# Patient Record
Sex: FEMALE | Race: WHITE | Employment: FULL TIME | URBAN - METROPOLITAN AREA
[De-identification: names, ages, dates, MRNs, and addresses within clinical notes are randomized per-mention and may not be internally consistent; named-entity substitution may affect disease eponyms.]

---

## 2017-04-12 ENCOUNTER — OFFICE VISIT (OUTPATIENT)
Dept: OBSTETRICS AND GYNECOLOGY | Facility: CLINIC | Age: 62
End: 2017-04-12
Payer: COMMERCIAL

## 2017-04-12 VITALS
SYSTOLIC BLOOD PRESSURE: 144 MMHG | HEIGHT: 65 IN | HEART RATE: 80 BPM | DIASTOLIC BLOOD PRESSURE: 94 MMHG | BODY MASS INDEX: 38.6 KG/M2 | WEIGHT: 231.69 LBS

## 2017-04-12 DIAGNOSIS — Z12.31 SCREENING MAMMOGRAM, ENCOUNTER FOR: ICD-10-CM

## 2017-04-12 DIAGNOSIS — Z01.419 ENCOUNTER FOR WELL WOMAN EXAM WITH ROUTINE GYNECOLOGICAL EXAM: Primary | ICD-10-CM

## 2017-04-12 DIAGNOSIS — Z12.39 BREAST CANCER SCREENING: ICD-10-CM

## 2017-04-12 PROCEDURE — 99386 PREV VISIT NEW AGE 40-64: CPT | Mod: S$GLB,,, | Performed by: NURSE PRACTITIONER

## 2017-04-12 PROCEDURE — 3080F DIAST BP >= 90 MM HG: CPT | Mod: S$GLB,,, | Performed by: NURSE PRACTITIONER

## 2017-04-12 PROCEDURE — 99999 PR PBB SHADOW E&M-EST. PATIENT-LVL III: CPT | Mod: PBBFAC,,, | Performed by: NURSE PRACTITIONER

## 2017-04-12 PROCEDURE — 3077F SYST BP >= 140 MM HG: CPT | Mod: S$GLB,,, | Performed by: NURSE PRACTITIONER

## 2017-04-12 PROCEDURE — 88175 CYTOPATH C/V AUTO FLUID REDO: CPT

## 2017-04-12 RX ORDER — METFORMIN HYDROCHLORIDE 500 MG/1
TABLET ORAL
Refills: 0 | COMMUNITY
Start: 2017-04-04 | End: 2020-03-11

## 2017-04-12 RX ORDER — VALSARTAN 80 MG/1
80 TABLET ORAL DAILY
Refills: 0 | COMMUNITY
Start: 2017-01-30 | End: 2020-03-11 | Stop reason: ALTCHOICE

## 2017-04-18 NOTE — PROGRESS NOTES
Chief Complaint   Patient presents with    Well Woman       History of Present Illness: Della Juarez is a 61 y.o. female that presents today 18 for well gyn visit.  Pt here for annual exam. Pt reports that it has been about 10 years since she has had a well woman exam. She denies ever having any abnormal pap smears in the past. She stopped having cycles at age 49 and has never been on HRT. She does report that has been having hot flashes and night sweats for several years and would like to discuss getting on hormones. Pt is also due for a mammogram and states she has been in over 3 years because the last one she had hurt really bad. Pt denies any other complaints or concerns at this time.         Past Medical History:   Diagnosis Date    Diabetes mellitus     Hypertension     Mental disorder     Anxiety       Past Surgical History:   Procedure Laterality Date     SECTION      x3    COSMETIC SURGERY      Scar on right side of face       Family History   Problem Relation Age of Onset    Diabetes Maternal Grandmother     Hypertension Maternal Grandmother     Stroke Maternal Grandmother     Diabetes Father     Hypertension Father     Hemochromatosis Mother     Hypertension Mother     Stroke Mother     Breast cancer Neg Hx     Cancer Neg Hx     Colon cancer Neg Hx     Ovarian cancer Neg Hx     Eclampsia Neg Hx     Miscarriages / Stillbirths Neg Hx      labor Neg Hx        Social History     Social History    Marital status:      Spouse name: N/A    Number of children: N/A    Years of education: N/A     Social History Main Topics    Smoking status: Former Smoker     Packs/day: 0.50     Types: Cigarettes     Start date:      Quit date:     Smokeless tobacco: Never Used    Alcohol use No    Drug use: No    Sexual activity: Not Currently     Other Topics Concern    None     Social History Narrative       OB History    Para Term  AB SAB TAB Ectopic  "Multiple Living   3 0 0 0 0 0 0 0 0 3      # Outcome Date GA Lbr Wilfrid/2nd Weight Sex Delivery Anes PTL Lv   3       CS-Unspec   Y   2       CS-Unspec   Y   1       CS-Unspec   Y          Current Outpatient Prescriptions   Medication Sig Dispense Refill    alprazolam (XANAX) 0.25 MG tablet Take 1 tablet (0.25 mg total) by mouth 3 (three) times daily as needed for Insomnia or Anxiety. 60 tablet 2    aspirin (ECOTRIN) 81 MG EC tablet Take 1 tablet (81 mg total) by mouth once daily.  0    estrogen, conjugated,-medroxyprogesterone 0.625-2.5mg (PREMPRO) 0.625-2.5 mg per tablet Take 1 tablet by mouth once daily. 30 tablet 11    metformin (GLUCOPHAGE) 500 MG tablet take 1 tablet by mouth once daily with meals  0    valsartan (DIOVAN) 80 MG tablet Take 80 mg by mouth once daily.  0     No current facility-administered medications for this visit.        Review of patient's allergies indicates:   Allergen Reactions    Penicillins      Other reaction(s): Vomiting  Other reaction(s): Swelling  Other reaction(s): Nausea  Other reaction(s): Hives       Review of Symptoms:  GENERAL: Denies weight gain or weight loss. Feeling well overall.   SKIN: Denies rash or lesions.   ABDOMEN: No abdominal pain, constipation, diarrhea, nausea, vomiting or rectal bleeding.   URINARY: No frequency, dysuria, hematuria, or burning on urination.    BP (!) 144/94  Pulse 80  Ht 5' 4.5" (1.638 m)  Wt 105.1 kg (231 lb 11.3 oz)  BMI 39.16 kg/m2    Physical Exam:  APPEARANCE: Well nourished, well developed, in no acute distress.  SKIN: Normal skin turgor, no lesions.  ABDOMEN: Soft. No tenderness or masses. No hepatosplenomegaly. No hernias.  BREASTS: Symmetrical, no skin changes or visible lesions. No palpable masses, nipple discharge or adenopathy bilaterally.  PELVIC: Normal external female genitalia without lesions. Normal hair distribution. Adequate perineal body, normal urethral meatus. Urethra with no masses.  Bladder " nontender. Pt did not tolerate speculum exam very well. Vagina dry with minimal rugae; without lesions or discharge. Cervix pink and without lesions. No significant cystocele or rectocele. Bimanual exam showed uterus normal size, shape, position, mobile and nontender. Adnexa without masses or tenderness. Urethra and bladder normal. PAP DONE    ASSESSMENT/PLAN:  Encounter for well woman exam with routine gynecological exam  -     Liquid-based pap smear, screening    Breast cancer screening  -     Mammo Digital Screening Bilat with CAD; Future; Expected date: 4/12/17    Screening mammogram, encounter for    Other orders  -     estrogen, conjugated,-medroxyprogesterone 0.625-2.5mg (PREMPRO) 0.625-2.5 mg per tablet; Take 1 tablet by mouth once daily.  Dispense: 30 tablet; Refill: 11      A full discussion of the benefit-risk ratio of hormonal replacement therapy was carried out. Improvement in vasomotor and other climacteric symptoms is discussed, including possible improvements in sleep and mood. Reduction of risk for osteoporosis was explained. We discussed the study data showing increased risk of thrombo-embolic events such as myocardial infarction, stroke and also possibly breast cancer with estrogen replacement, and how this might affect her. The range of side effects such as breast tenderness, weight gain and including possible increases in lifetime risk of breast cancer and possible thrombotic complications was discussed. We also discussed ACOG's recommendation to use hormone replacement therapy for the shortest amount of time and the lowest dose possible. Alternative such as herbal and soy-based products were reviewed. All of her questions about this therapy were answered. Pt prescribed prempro. However after going over the risks, she is unsure if she will take it. Gave her info on black cohosh and she states that she will probably try this first and may not even take the prempro.         Patient was counseled  today on Pap guidelines, recommendation for pelvic exams, mammograms starting annually at age 40, Colonoscopy after the age of 50, Dexa Bone Scan and calcium and vitamin D supplementation in menopause and to see her PCP for other health maintenance.

## 2020-02-17 DIAGNOSIS — E11.9 TYPE 2 DIABETES MELLITUS WITHOUT COMPLICATION, WITHOUT LONG-TERM CURRENT USE OF INSULIN: Primary | ICD-10-CM

## 2020-02-17 RX ORDER — METFORMIN HYDROCHLORIDE 500 MG/1
500 TABLET, FILM COATED, EXTENDED RELEASE ORAL 2 TIMES DAILY WITH MEALS
Qty: 180 TABLET | Refills: 0 | Status: SHIPPED | OUTPATIENT
Start: 2020-02-17 | End: 2020-03-11

## 2020-02-17 NOTE — TELEPHONE ENCOUNTER
----- Message from Kyra Gerber sent at 2/17/2020  2:19 PM CST -----  Refill on Metformin 500 mg ER to [facility] on [address].      # [phone]

## 2020-03-11 ENCOUNTER — TELEPHONE (OUTPATIENT)
Dept: FAMILY MEDICINE | Facility: CLINIC | Age: 65
End: 2020-03-11

## 2020-03-11 ENCOUNTER — OFFICE VISIT (OUTPATIENT)
Dept: FAMILY MEDICINE | Facility: CLINIC | Age: 65
End: 2020-03-11
Payer: COMMERCIAL

## 2020-03-11 VITALS
HEART RATE: 88 BPM | WEIGHT: 222.19 LBS | SYSTOLIC BLOOD PRESSURE: 135 MMHG | HEIGHT: 65 IN | DIASTOLIC BLOOD PRESSURE: 88 MMHG | BODY MASS INDEX: 37.02 KG/M2

## 2020-03-11 DIAGNOSIS — E11.9 TYPE 2 DIABETES MELLITUS WITHOUT COMPLICATION, WITHOUT LONG-TERM CURRENT USE OF INSULIN: ICD-10-CM

## 2020-03-11 DIAGNOSIS — Z20.1 EXPOSURE TO TB: Primary | ICD-10-CM

## 2020-03-11 DIAGNOSIS — I10 ESSENTIAL HYPERTENSION: ICD-10-CM

## 2020-03-11 DIAGNOSIS — F41.1 GAD (GENERALIZED ANXIETY DISORDER): ICD-10-CM

## 2020-03-11 LAB — HBA1C MFR BLD: 6.1 %

## 2020-03-11 PROCEDURE — 3008F BODY MASS INDEX DOCD: CPT | Mod: S$GLB,,, | Performed by: FAMILY MEDICINE

## 2020-03-11 PROCEDURE — 3079F PR MOST RECENT DIASTOLIC BLOOD PRESSURE 80-89 MM HG: ICD-10-PCS | Mod: S$GLB,,, | Performed by: FAMILY MEDICINE

## 2020-03-11 PROCEDURE — 3044F PR MOST RECENT HEMOGLOBIN A1C LEVEL <7.0%: ICD-10-PCS | Mod: S$GLB,,, | Performed by: FAMILY MEDICINE

## 2020-03-11 PROCEDURE — 99213 OFFICE O/P EST LOW 20 MIN: CPT | Mod: S$GLB,,, | Performed by: FAMILY MEDICINE

## 2020-03-11 PROCEDURE — 3079F DIAST BP 80-89 MM HG: CPT | Mod: S$GLB,,, | Performed by: FAMILY MEDICINE

## 2020-03-11 PROCEDURE — 3075F SYST BP GE 130 - 139MM HG: CPT | Mod: S$GLB,,, | Performed by: FAMILY MEDICINE

## 2020-03-11 PROCEDURE — 83036 POCT HEMOGLOBIN A1C: ICD-10-PCS | Mod: QW,,, | Performed by: FAMILY MEDICINE

## 2020-03-11 PROCEDURE — 3044F HG A1C LEVEL LT 7.0%: CPT | Mod: S$GLB,,, | Performed by: FAMILY MEDICINE

## 2020-03-11 PROCEDURE — 3075F PR MOST RECENT SYSTOLIC BLOOD PRESS GE 130-139MM HG: ICD-10-PCS | Mod: S$GLB,,, | Performed by: FAMILY MEDICINE

## 2020-03-11 PROCEDURE — 99213 PR OFFICE/OUTPT VISIT, EST, LEVL III, 20-29 MIN: ICD-10-PCS | Mod: S$GLB,,, | Performed by: FAMILY MEDICINE

## 2020-03-11 PROCEDURE — 83036 HEMOGLOBIN GLYCOSYLATED A1C: CPT | Mod: QW,,, | Performed by: FAMILY MEDICINE

## 2020-03-11 PROCEDURE — 3008F PR BODY MASS INDEX (BMI) DOCUMENTED: ICD-10-PCS | Mod: S$GLB,,, | Performed by: FAMILY MEDICINE

## 2020-03-11 RX ORDER — GLIMEPIRIDE 2 MG/1
1 TABLET ORAL 2 TIMES DAILY
COMMUNITY
Start: 2020-02-10 | End: 2020-03-11 | Stop reason: SDUPTHER

## 2020-03-11 RX ORDER — TELMISARTAN 80 MG/1
TABLET ORAL
COMMUNITY
Start: 2020-02-26 | End: 2020-03-11 | Stop reason: SDUPTHER

## 2020-03-11 NOTE — TELEPHONE ENCOUNTER
----- Message from Kyra Gerber sent at 3/11/2020  9:19 AM CDT -----  Pt states she may have bene exposed to TB and is requesting to be tested. She has been added to todays schedule and white dotted.  [phone]

## 2020-03-11 NOTE — PROGRESS NOTES
SUBJECTIVE:    Patient ID: Della Juarez is a 64 y.o. female.    Chief Complaint: No chief complaint on file.    Pt was visiting her brother, who was born with cleft lip and pallet) from [place], that arrived last week.  He was brought to the hospital on Friday.  He was sick with severe ear and throat pain.  Told he may has a URI, one of which may be TB.  She was told that due to her medical problems she needs to be checked for TB.  He has some kind of fungus.     Pt has also been keeping a log of his blood sugars. A1c is 6.1.  Has been working on diet and has noticed a drop in blood sugars.  Has also noticed that sometimes his blood sugars go a little low depending on stress.      Office Visit on 2020   Component Date Value Ref Range Status    Hemoglobin A1C 2020 6.1  % Final       Past Medical History:   Diagnosis Date    Diabetes mellitus     Hypertension     Mental disorder     Anxiety     Past Surgical History:   Procedure Laterality Date     SECTION      x3    COSMETIC SURGERY      Scar on right side of face     Family History   Problem Relation Age of Onset    Diabetes Maternal Grandmother     Hypertension Maternal Grandmother     Stroke Maternal Grandmother     Diabetes Father     Hypertension Father     Hemochromatosis Mother     Hypertension Mother     Stroke Mother     Breast cancer Neg Hx     Cancer Neg Hx     Colon cancer Neg Hx     Ovarian cancer Neg Hx     Eclampsia Neg Hx     Miscarriages / Stillbirths Neg Hx      labor Neg Hx        Marital Status:   Alcohol History:  reports that she does not drink alcohol.  Tobacco History:  reports that she quit smoking about 4 years ago. Her smoking use included cigarettes. She started smoking about 15 years ago. She smoked 0.50 packs per day. She has never used smokeless tobacco.  Drug History:  reports that she does not use drugs.    Review of patient's allergies indicates:   Allergen Reactions     Penicillins      Other reaction(s): Vomiting  Other reaction(s): Swelling  Other reaction(s): Nausea  Other reaction(s): Hives       Current Outpatient Medications:     GLIMEPIRIDE ORAL, Take 2 mg by mouth 2 (two) times daily., Disp: , Rfl:     ALPRAZolam (XANAX) 0.25 MG tablet, Take 1 tablet (0.25 mg total) by mouth 3 (three) times daily as needed for Insomnia or Anxiety., Disp: 60 tablet, Rfl: 2    aspirin (ECOTRIN) 81 MG EC tablet, Take 1 tablet (81 mg total) by mouth once daily., Disp: , Rfl: 0    estrogen, conjugated,-medroxyprogesterone 0.625-2.5mg (PREMPRO) 0.625-2.5 mg per tablet, Take 1 tablet by mouth once daily., Disp: 30 tablet, Rfl: 11    glimepiride (AMARYL) 2 MG tablet, Take 1 tablet (2 mg total) by mouth 2 (two) times daily., Disp: 60 tablet, Rfl: 2    metFORMIN (GLUCOPHAGE-XR) 500 MG XR 24hr tablet, Take 1 tablet (500 mg total) by mouth 2 (two) times daily with meals., Disp: 180 tablet, Rfl: 3    telmisartan (MICARDIS) 80 MG Tab, Take 1 tablet (80 mg total) by mouth once daily., Disp: 30 tablet, Rfl: 2    Review of Systems   Constitutional: Negative for appetite change, fatigue, fever and unexpected weight change.   Respiratory: Negative for cough, chest tightness, shortness of breath and wheezing.    Cardiovascular: Negative for chest pain and leg swelling.   Gastrointestinal: Negative for abdominal pain, constipation, nausea and vomiting.        -heartburn   Genitourinary: Negative for difficulty urinating, dysuria, frequency and urgency.   Musculoskeletal: Negative for arthralgias, back pain, myalgias and neck pain.   Skin: Negative for rash.   Neurological: Negative for dizziness, weakness, numbness and headaches.   Hematological: Does not bruise/bleed easily.   Psychiatric/Behavioral: Negative for dysphoric mood, sleep disturbance and suicidal ideas. The patient is nervous/anxious.    All other systems reviewed and are negative.         Objective:      Vitals:    03/11/20 1441 03/11/20  "1525   BP: (!) 164/88 135/88   Pulse: 88    Weight: 100.8 kg (222 lb 3.2 oz)    Height: 5' 4.5" (1.638 m)      Body mass index is 37.55 kg/m².     Physical Exam   Constitutional: She is oriented to person, place, and time. She appears well-developed and well-nourished. No distress.   Obese     HENT:   Head: Normocephalic and atraumatic.   Neck: Neck supple. No thyromegaly present.   Cardiovascular: Normal rate, regular rhythm and normal heart sounds. Exam reveals no friction rub.   No murmur heard.  Pulmonary/Chest: Effort normal and breath sounds normal. She has no wheezes. She has no rales.   Abdominal: Soft. Bowel sounds are normal. She exhibits no distension. There is no tenderness.   Musculoskeletal: She exhibits no edema.   Lymphadenopathy:     She has no cervical adenopathy.   Neurological: She is alert and oriented to person, place, and time.   Skin: Skin is warm and dry. No rash noted.   Psychiatric: She has a normal mood and affect. Her speech is normal and behavior is normal. Judgment and thought content normal. She is attentive.   Vitals reviewed.        Assessment:       1. Exposure to TB    2. Type 2 diabetes mellitus without complication, without long-term current use of insulin    3. MELODY (generalized anxiety disorder)    4. Essential hypertension         Plan:       Exposure to TB  Comments:  Pt advised to get TB test on Friday and then in 8-10 wks from that day    Type 2 diabetes mellitus without complication, without long-term current use of insulin  Comments:  Antihyperglycemic meds filled.  To monitor Blood sugar and hold glimiperide for low blood sugars or when skipping meals to prevent hypoglycemia  Orders:  -     Hemoglobin A1C, POCT  -     metFORMIN (GLUCOPHAGE-XR) 500 MG XR 24hr tablet; Take 1 tablet (500 mg total) by mouth 2 (two) times daily with meals.  Dispense: 180 tablet; Refill: 3  -     glimepiride (AMARYL) 2 MG tablet; Take 1 tablet (2 mg total) by mouth 2 (two) times daily.  " Dispense: 60 tablet; Refill: 2    MELODY (generalized anxiety disorder)  Comments:  Anxiolytic refilled.  Orders:  -     ALPRAZolam (XANAX) 0.25 MG tablet; Take 1 tablet (0.25 mg total) by mouth 3 (three) times daily as needed for Insomnia or Anxiety.  Dispense: 60 tablet; Refill: 2    Essential hypertension  Comments:  Blood pressure med refilled.  Orders:  -     telmisartan (MICARDIS) 80 MG Tab; Take 1 tablet (80 mg total) by mouth once daily.  Dispense: 30 tablet; Refill: 2    Other  Lab results discussed and reviewed with patient.    Follow up in about 2 days (around 3/13/2020), or if symptoms worsen or fail to improve, for for TB test, As scheduled.

## 2020-03-12 RX ORDER — GLIMEPIRIDE 2 MG/1
2 TABLET ORAL 2 TIMES DAILY
Qty: 60 TABLET | Refills: 2 | Status: SHIPPED | OUTPATIENT
Start: 2020-03-12 | End: 2020-04-11

## 2020-03-12 RX ORDER — METFORMIN HYDROCHLORIDE 500 MG/1
500 TABLET, EXTENDED RELEASE ORAL 2 TIMES DAILY WITH MEALS
Qty: 180 TABLET | Refills: 3 | Status: SHIPPED | OUTPATIENT
Start: 2020-03-12 | End: 2020-08-19 | Stop reason: SDUPTHER

## 2020-03-12 RX ORDER — TELMISARTAN 80 MG/1
80 TABLET ORAL DAILY
Qty: 30 TABLET | Refills: 2 | Status: SHIPPED | OUTPATIENT
Start: 2020-03-12 | End: 2020-06-18 | Stop reason: SDUPTHER

## 2020-03-12 RX ORDER — ALPRAZOLAM 0.25 MG/1
0.25 TABLET ORAL 3 TIMES DAILY PRN
Qty: 60 TABLET | Refills: 2 | Status: SHIPPED | OUTPATIENT
Start: 2020-03-12 | End: 2020-04-21 | Stop reason: DRUGHIGH

## 2020-03-13 ENCOUNTER — CLINICAL SUPPORT (OUTPATIENT)
Dept: FAMILY MEDICINE | Facility: CLINIC | Age: 65
End: 2020-03-13
Payer: COMMERCIAL

## 2020-03-13 DIAGNOSIS — Z11.1 SCREENING FOR TUBERCULOSIS: Primary | ICD-10-CM

## 2020-03-13 PROCEDURE — 86580 PR  TB INTRADERMAL TEST: ICD-10-PCS | Mod: S$GLB,,, | Performed by: FAMILY MEDICINE

## 2020-03-13 PROCEDURE — 86580 TB INTRADERMAL TEST: CPT | Mod: S$GLB,,, | Performed by: FAMILY MEDICINE

## 2020-03-16 ENCOUNTER — CLINICAL SUPPORT (OUTPATIENT)
Dept: FAMILY MEDICINE | Facility: CLINIC | Age: 65
End: 2020-03-16
Payer: COMMERCIAL

## 2020-03-16 DIAGNOSIS — Z11.1 SCREENING FOR TUBERCULOSIS: Primary | ICD-10-CM

## 2020-03-16 LAB
TB INDURATION - 48 HR READ: 0 MM
TB INDURATION - 72 HR READ: 0 MM
TB SKIN TEST - 48 HR READ: NEGATIVE
TB SKIN TEST - 72 HR READ: NEGATIVE

## 2020-03-16 PROCEDURE — 86580 TB INTRADERMAL TEST: CPT | Mod: S$GLB,,, | Performed by: FAMILY MEDICINE

## 2020-03-16 PROCEDURE — 86580 POCT TB SKIN TEST: ICD-10-PCS | Mod: S$GLB,,, | Performed by: FAMILY MEDICINE

## 2020-04-21 DIAGNOSIS — F41.1 GAD (GENERALIZED ANXIETY DISORDER): Primary | ICD-10-CM

## 2020-04-21 RX ORDER — ALPRAZOLAM 0.5 MG/1
1 TABLET ORAL 2 TIMES DAILY
COMMUNITY
End: 2020-04-21 | Stop reason: SDUPTHER

## 2020-04-21 RX ORDER — ALPRAZOLAM 0.5 MG/1
0.5 TABLET ORAL 2 TIMES DAILY
Qty: 60 TABLET | Refills: 2 | Status: SHIPPED | OUTPATIENT
Start: 2020-04-21 | End: 2020-08-19 | Stop reason: SDUPTHER

## 2020-04-21 NOTE — TELEPHONE ENCOUNTER
Spoke with pt - Pt xanax0.25mg cancelled at pharmacy. Please resend to [facility] -[phone] ABILIO

## 2020-04-21 NOTE — TELEPHONE ENCOUNTER
----- Message from Lisandra Watts MA sent at 4/21/2020 10:37 AM CDT -----  Pt called in to advise that her Rx for Alprazolam that was sent into to [facility] was for 0.25 mg, however the pt reports that the dosage was suppose to be for 0.5 mg.  She states she just called pharmacy and so far they have not received the corrected dosage of 0.5 mg.    Pharmacy - [facility] @ [address]    Pt - [phone]

## 2020-05-15 ENCOUNTER — TELEPHONE (OUTPATIENT)
Dept: FAMILY MEDICINE | Facility: CLINIC | Age: 65
End: 2020-05-15

## 2020-05-15 NOTE — TELEPHONE ENCOUNTER
----- Message from Fabrice Borges sent at 5/15/2020 11:43 AM CDT -----  Contact: Della Juarez  Pt needs a letter written for her job in regards to her being high-risk due to her underlying health issues. She needs this letter Monday if possible.   Pt# [phone]

## 2020-05-18 ENCOUNTER — TELEPHONE (OUTPATIENT)
Dept: FAMILY MEDICINE | Facility: CLINIC | Age: 65
End: 2020-05-18

## 2020-05-18 NOTE — LETTER
[address]  [place] [address]  Phone: [phone]   [phone]                        MD Fatuma Strong MD Chequita Williams, MD Matthew Bassett, PA-C Allison Hoffritz, DANIS Puentes, DANIS Richardson, DANIS      Date: 05/18/2020        Patient: Della Juarez  YOB: 1955      To whom it may concern:    Please excuse Della Juarez from work for until further notice starting May 15, 2020, due to being in a high risk group for COVID-19 infection due to pre-existing medical conditions.    Sincerely,            Fatuma Hollins MD

## 2020-05-18 NOTE — TELEPHONE ENCOUNTER
----- Message from Edwige Prather sent at 5/18/2020  2:25 PM CDT -----  Pre message pt is calling back about needing a letter for her employer   [phone]

## 2020-05-18 NOTE — TELEPHONE ENCOUNTER
Spoke with pt - Pt informed that her letter was ready and available to  at the front office. ABILIO

## 2020-06-18 DIAGNOSIS — I10 ESSENTIAL HYPERTENSION: ICD-10-CM

## 2020-06-18 DIAGNOSIS — E11.9 TYPE 2 DIABETES MELLITUS WITHOUT COMPLICATION, WITHOUT LONG-TERM CURRENT USE OF INSULIN: Primary | ICD-10-CM

## 2020-06-18 RX ORDER — GLIMEPIRIDE 2 MG/1
2 TABLET ORAL 2 TIMES DAILY
Qty: 180 TABLET | Refills: 1 | Status: SHIPPED | OUTPATIENT
Start: 2020-06-18 | End: 2020-08-19 | Stop reason: SDUPTHER

## 2020-06-18 RX ORDER — TELMISARTAN 80 MG/1
80 TABLET ORAL DAILY
Qty: 30 TABLET | Refills: 2 | Status: SHIPPED | OUTPATIENT
Start: 2020-06-18 | End: 2020-08-19 | Stop reason: SDUPTHER

## 2020-06-18 NOTE — TELEPHONE ENCOUNTER
----- Message from Fabrice Borges sent at 6/18/2020  3:11 PM CDT -----  Regarding: Della Juarez  Contact: Della Juarez  Needs a refill on Glimiperide   Send to [facility] on [address]  Pt# [phone]

## 2020-06-18 NOTE — TELEPHONE ENCOUNTER
----- Message from Fabrice Borges sent at 6/18/2020  3:32 PM CDT -----  Regarding: Refill  Contact: Della Juarez  Pt called back she needs Telmisartan  Send to [facility]  Pt# [phone]

## 2020-06-19 NOTE — TELEPHONE ENCOUNTER
The patient's prescription has been approved and sent to   [id number] - [place], [place] - [address] [address] [facility]  [address]  [facility] [address]  Phone: [phone] Fax: [phone]    [id number] - [place] - [address]  [address] [facility] [address]  Phone: [phone] Fax: [phone]

## 2020-08-12 ENCOUNTER — TELEPHONE (OUTPATIENT)
Dept: FAMILY MEDICINE | Facility: CLINIC | Age: 65
End: 2020-08-12

## 2020-08-12 NOTE — TELEPHONE ENCOUNTER
----- Message from Kyra Gerber sent at 8/12/2020  3:49 PM CDT -----  Pt would like the nurse to call her back she is wanting to discuss her HA1C.  She also needs a letter by this Friday for work that says she is Diabetic, HTN and high risk.  Ask that the letter state she needs to be in lower populated areas and not on weekends or night shift alone.    # [phone]

## 2020-08-12 NOTE — LETTER
[address]  [place] [address]  Phone: [phone]   [phone]                        MD Fatuma Strong MD Chequita Williams, MD Matthew Bassett, PA-C Allison Hoffritz, DANIS Navarro NP      Date: 08/13/2020    Patient: Della Juarez  YOB: 1955      To whom it may concern:    The purpose of this letter is to verify that Ms. Juarez has underlying health conditions that would put her at high risk for adverse complications should she contract COVID-19 disease. She has notified me that while she is capable and willing to do her job, she may be at increased risk for exposure from live guest interaction. It is strongly recommended that every attempt be made to accommodate Ms. Juarez ability to work in an environment that generally decreases her potential live personal interactions.        Sincerely,           Fatuma Hollins MD

## 2020-08-12 NOTE — TELEPHONE ENCOUNTER
Spoke with pt - pt states that she is concerned about her a1c from march and pt informed that she will have another a1c done at her upcoming appt on 8/19.     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 Pt states that her employer is trying to get her to work the night shift and weekend shift where people run around like they are crazy and even though there are signs up that clearly states mask must be worn they don't and excuses like I have a medical condition are thrown around like nothing. Pt states that she needs a letter stating something to the effect that she is capable and willing to do her job but she is at an increased risk for covid because of her medical conditions because of the excessive guest interaction. Please advise. ABILIO

## 2020-08-13 NOTE — TELEPHONE ENCOUNTER
----- Message from Fabrice Borges sent at 8/13/2020 10:24 AM CDT -----  Regarding: Needs call back from nurse.  Contact: Della Juarez  Pt is calling in regards to her message from yesterday, she says that she needs her letter by tomorrow, she says she doesn't want to be a pest.  Please give her a call back # [phone]

## 2020-08-19 ENCOUNTER — OFFICE VISIT (OUTPATIENT)
Dept: FAMILY MEDICINE | Facility: CLINIC | Age: 65
End: 2020-08-19
Payer: COMMERCIAL

## 2020-08-19 VITALS
HEIGHT: 65 IN | SYSTOLIC BLOOD PRESSURE: 132 MMHG | TEMPERATURE: 99 F | DIASTOLIC BLOOD PRESSURE: 84 MMHG | OXYGEN SATURATION: 97 % | BODY MASS INDEX: 39.09 KG/M2 | HEART RATE: 100 BPM | WEIGHT: 234.63 LBS

## 2020-08-19 DIAGNOSIS — Z12.31 SCREENING MAMMOGRAM, ENCOUNTER FOR: ICD-10-CM

## 2020-08-19 DIAGNOSIS — E11.9 TYPE 2 DIABETES MELLITUS WITHOUT COMPLICATION, WITHOUT LONG-TERM CURRENT USE OF INSULIN: Primary | ICD-10-CM

## 2020-08-19 DIAGNOSIS — Z79.899 LONG-TERM USE OF HIGH-RISK MEDICATION: ICD-10-CM

## 2020-08-19 DIAGNOSIS — Z13.29 SCREENING FOR ENDOCRINE DISORDER: ICD-10-CM

## 2020-08-19 DIAGNOSIS — I10 ESSENTIAL HYPERTENSION: ICD-10-CM

## 2020-08-19 DIAGNOSIS — Z13.6 SCREENING FOR ISCHEMIC HEART DISEASE (IHD): ICD-10-CM

## 2020-08-19 DIAGNOSIS — F41.1 GAD (GENERALIZED ANXIETY DISORDER): ICD-10-CM

## 2020-08-19 LAB — HBA1C MFR BLD: 6.7 %

## 2020-08-19 PROCEDURE — 83036 POCT HEMOGLOBIN A1C: ICD-10-PCS | Mod: QW,,, | Performed by: FAMILY MEDICINE

## 2020-08-19 PROCEDURE — 3044F PR MOST RECENT HEMOGLOBIN A1C LEVEL <7.0%: ICD-10-PCS | Mod: S$GLB,,, | Performed by: FAMILY MEDICINE

## 2020-08-19 PROCEDURE — 3008F BODY MASS INDEX DOCD: CPT | Mod: S$GLB,,, | Performed by: FAMILY MEDICINE

## 2020-08-19 PROCEDURE — 3079F PR MOST RECENT DIASTOLIC BLOOD PRESSURE 80-89 MM HG: ICD-10-PCS | Mod: S$GLB,,, | Performed by: FAMILY MEDICINE

## 2020-08-19 PROCEDURE — 3008F PR BODY MASS INDEX (BMI) DOCUMENTED: ICD-10-PCS | Mod: S$GLB,,, | Performed by: FAMILY MEDICINE

## 2020-08-19 PROCEDURE — 99214 PR OFFICE/OUTPT VISIT, EST, LEVL IV, 30-39 MIN: ICD-10-PCS | Mod: S$GLB,,, | Performed by: FAMILY MEDICINE

## 2020-08-19 PROCEDURE — 3075F SYST BP GE 130 - 139MM HG: CPT | Mod: S$GLB,,, | Performed by: FAMILY MEDICINE

## 2020-08-19 PROCEDURE — 3075F PR MOST RECENT SYSTOLIC BLOOD PRESS GE 130-139MM HG: ICD-10-PCS | Mod: S$GLB,,, | Performed by: FAMILY MEDICINE

## 2020-08-19 PROCEDURE — 99214 OFFICE O/P EST MOD 30 MIN: CPT | Mod: S$GLB,,, | Performed by: FAMILY MEDICINE

## 2020-08-19 PROCEDURE — 83036 HEMOGLOBIN GLYCOSYLATED A1C: CPT | Mod: QW,,, | Performed by: FAMILY MEDICINE

## 2020-08-19 PROCEDURE — 3044F HG A1C LEVEL LT 7.0%: CPT | Mod: S$GLB,,, | Performed by: FAMILY MEDICINE

## 2020-08-19 PROCEDURE — 3079F DIAST BP 80-89 MM HG: CPT | Mod: S$GLB,,, | Performed by: FAMILY MEDICINE

## 2020-08-19 RX ORDER — GLIMEPIRIDE 2 MG/1
2 TABLET ORAL 2 TIMES DAILY
Qty: 180 TABLET | Refills: 1 | Status: SHIPPED | OUTPATIENT
Start: 2020-08-19 | End: 2020-11-23 | Stop reason: SDUPTHER

## 2020-08-19 RX ORDER — TELMISARTAN 80 MG/1
80 TABLET ORAL DAILY
Qty: 30 TABLET | Refills: 5 | Status: SHIPPED | OUTPATIENT
Start: 2020-08-19 | End: 2020-11-23 | Stop reason: SDUPTHER

## 2020-08-19 RX ORDER — METFORMIN HYDROCHLORIDE 500 MG/1
500 TABLET, EXTENDED RELEASE ORAL 2 TIMES DAILY WITH MEALS
Qty: 180 TABLET | Refills: 1 | Status: SHIPPED | OUTPATIENT
Start: 2020-08-19 | End: 2020-11-23 | Stop reason: SDUPTHER

## 2020-08-19 RX ORDER — ALPRAZOLAM 0.5 MG/1
0.5 TABLET ORAL 2 TIMES DAILY
Qty: 60 TABLET | Refills: 2 | Status: SHIPPED | OUTPATIENT
Start: 2020-08-19 | End: 2020-11-23 | Stop reason: SDUPTHER

## 2020-08-19 NOTE — PROGRESS NOTES
SUBJECTIVE:    Patient ID: Della Juarez is a 64 y.o. female.    Chief Complaint: Diabetes (follow up) and Bottles not brought    Pt here to checkup on diabetes. A1c is 6.7%. has gained about 12 pounds since last visit.   Has been having an occasional drink of wine.   Continues to keep a log of his blood sugars.         Past Medical History:   Diagnosis Date    Diabetes mellitus     Hypertension     Mental disorder     Anxiety     Social History     Socioeconomic History    Marital status:      Spouse name: Not on file    Number of children: Not on file    Years of education: Not on file    Highest education level: Not on file   Occupational History    Not on file   Social Needs    Financial resource strain: Not on file    Food insecurity     Worry: Not on file     Inability: Not on file    Transportation needs     Medical: Not on file     Non-medical: Not on file   Tobacco Use    Smoking status: Former Smoker     Packs/day: 0.50     Types: Cigarettes     Start date:      Quit date: 2016     Years since quittin.6    Smokeless tobacco: Never Used   Substance and Sexual Activity    Alcohol use: No    Drug use: No    Sexual activity: Not Currently   Lifestyle    Physical activity     Days per week: Not on file     Minutes per session: Not on file    Stress: Not on file   Relationships    Social connections     Talks on phone: Not on file     Gets together: Not on file     Attends Worship service: Not on file     Active member of club or organization: Not on file     Attends meetings of clubs or organizations: Not on file     Relationship status: Not on file   Other Topics Concern    Not on file   Social History Narrative    Not on file     Past Surgical History:   Procedure Laterality Date     SECTION      x3    COSMETIC SURGERY      Scar on right side of face     Family History   Problem Relation Age of Onset    Diabetes Maternal Grandmother     Hypertension Maternal  Grandmother     Stroke Maternal Grandmother     Diabetes Father     Hypertension Father     Hemochromatosis Mother     Hypertension Mother     Stroke Mother     Breast cancer Neg Hx     Cancer Neg Hx     Colon cancer Neg Hx     Ovarian cancer Neg Hx     Eclampsia Neg Hx     Miscarriages / Stillbirths Neg Hx      labor Neg Hx        Review of patient's allergies indicates:   Allergen Reactions    Penicillins      Other reaction(s): Vomiting  Other reaction(s): Swelling  Other reaction(s): Nausea  Other reaction(s): Hives       Current Outpatient Medications:     ALPRAZolam (XANAX) 0.5 MG tablet, Take 1 tablet (0.5 mg total) by mouth 2 (two) times daily., Disp: 60 tablet, Rfl: 2    aspirin (ECOTRIN) 81 MG EC tablet, Take 1 tablet (81 mg total) by mouth once daily., Disp: , Rfl: 0    glimepiride (AMARYL) 2 MG tablet, Take 1 tablet (2 mg total) by mouth 2 (two) times daily., Disp: 180 tablet, Rfl: 1    metFORMIN (GLUCOPHAGE-XR) 500 MG ER 24hr tablet, Take 1 tablet (500 mg total) by mouth 2 (two) times daily with meals., Disp: 180 tablet, Rfl: 1    telmisartan (MICARDIS) 80 MG Tab, Take 1 tablet (80 mg total) by mouth once daily., Disp: 30 tablet, Rfl: 5    estrogen, conjugated,-medroxyprogesterone 0.625-2.5mg (PREMPRO) 0.625-2.5 mg per tablet, Take 1 tablet by mouth once daily., Disp: 30 tablet, Rfl: 11    Review of Systems   Constitutional: Negative for appetite change, fatigue, fever and unexpected weight change.   Respiratory: Negative for cough, chest tightness, shortness of breath and wheezing.    Cardiovascular: Negative for chest pain and leg swelling.   Gastrointestinal: Negative for abdominal pain, constipation, nausea and vomiting.        -heartburn   Genitourinary: Negative for difficulty urinating, dysuria, frequency and urgency.   Musculoskeletal: Negative for arthralgias, back pain, myalgias and neck pain.   Skin: Negative for rash.   Neurological: Negative for dizziness,  "weakness, numbness and headaches.   Hematological: Does not bruise/bleed easily.   Psychiatric/Behavioral: Negative for dysphoric mood, sleep disturbance and suicidal ideas. The patient is nervous/anxious.    All other systems reviewed and are negative.         Objective:      Vitals:    08/19/20 1133   BP: 132/84   Pulse: 100   Temp: 98.7 °F (37.1 °C)   SpO2: 97%   Weight: 106.4 kg (234 lb 9.6 oz)   Height: 5' 4.5" (1.638 m)   Body mass index is 39.65 kg/m².  Wt Readings from Last 4 Encounters:   08/19/20 106.4 kg (234 lb 9.6 oz)   03/11/20 100.8 kg (222 lb 3.2 oz)   04/12/17 105.1 kg (231 lb 11.3 oz)   04/21/15 95.4 kg (210 lb 5.1 oz)       Physical Exam  Vitals signs reviewed.   Constitutional:       General: She is not in acute distress.     Appearance: Normal appearance. She is well-developed.      Comments: Obese     HENT:      Head: Normocephalic and atraumatic.   Neck:      Musculoskeletal: Neck supple.      Thyroid: No thyromegaly.   Cardiovascular:      Rate and Rhythm: Normal rate and regular rhythm.      Heart sounds: Normal heart sounds. No murmur. No friction rub.   Pulmonary:      Effort: Pulmonary effort is normal.      Breath sounds: Normal breath sounds. No wheezing or rales.   Abdominal:      General: Bowel sounds are normal. There is no distension.      Palpations: Abdomen is soft.      Tenderness: There is no abdominal tenderness.   Lymphadenopathy:      Cervical: No cervical adenopathy.   Skin:     General: Skin is warm and dry.      Findings: No rash.   Neurological:      Mental Status: She is alert and oriented to person, place, and time.   Psychiatric:         Attention and Perception: She is attentive.         Speech: Speech normal.         Behavior: Behavior normal.         Thought Content: Thought content normal.         Judgment: Judgment normal.           Assessment:       1. Type 2 diabetes mellitus without complication, without long-term current use of insulin    2. Essential " hypertension    3. MELODY (generalized anxiety disorder)    4. Screening mammogram, encounter for    5. Screening for endocrine disorder    6. Screening for ischemic heart disease (IHD)    7. Long-term use of high-risk medication         Plan:       Type 2 diabetes mellitus without complication, without long-term current use of insulin  Comments:  Controlled. To continue ADA diet, regular blood sugar diet, regular exercise. Will continue to monitor A1c.  Orders:  -     Hemoglobin A1C, POCT  -     glimepiride (AMARYL) 2 MG tablet; Take 1 tablet (2 mg total) by mouth 2 (two) times daily.  Dispense: 180 tablet; Refill: 1  -     metFORMIN (GLUCOPHAGE-XR) 500 MG ER 24hr tablet; Take 1 tablet (500 mg total) by mouth 2 (two) times daily with meals.  Dispense: 180 tablet; Refill: 1  -     Urinalysis; Future; Expected date: 01/29/2021  -     Lipid Panel; Future; Expected date: 08/30/2020  -     Comprehensive metabolic panel; Future; Expected date: 08/30/2020  -     CBC auto differential; Future; Expected date: 08/30/2020  -     Microalbumin/creatinine urine ratio; Future; Expected date: 08/30/2020  -     Hemoglobin A1C; Future; Expected date: 08/30/2020    Essential hypertension  Comments:  Blood pressure controlled. Will continue to monitor.   Orders:  -     telmisartan (MICARDIS) 80 MG Tab; Take 1 tablet (80 mg total) by mouth once daily.  Dispense: 30 tablet; Refill: 5  -     Cancel: Microalbumin/creatinine urine ratio; Future; Expected date: 08/19/2020  -     Cancel: Urinalysis; Future; Expected date: 08/19/2020  -     Cancel: Microalbumin/creatinine urine ratio; Future; Expected date: 08/19/2020  -     Cancel: Urinalysis; Future; Expected date: 08/19/2020    MELODY (generalized anxiety disorder)  Comments:  Controlled. Will continue to monitor symptoms.   Orders:  -     ALPRAZolam (XANAX) 0.5 MG tablet; Take 1 tablet (0.5 mg total) by mouth 2 (two) times daily.  Dispense: 60 tablet; Refill: 2    Screening mammogram, encounter  for  Comments:  Mammogram order sent to [facility]  Orders:  -     Mammo Digital Screening Bilat w/ Epifanio; Future; Expected date: 08/19/2020    Screening for endocrine disorder  -     Cancel: TSH w/reflex to FT4; Future; Expected date: 08/19/2020  -     TSH; Future; Expected date: 08/19/2020  -     TSH w/reflex to FT4; Future; Expected date: 01/30/2021    Screening for ischemic heart disease (IHD)  -     Cancel: Lipid Panel; Future; Expected date: 08/19/2020  -     Cancel: Lipid Panel; Future; Expected date: 08/19/2020    Long-term use of high-risk medication  -     Cancel: Comprehensive metabolic panel; Future; Expected date: 08/19/2020  -     Cancel: CBC auto differential; Future; Expected date: 08/19/2020  -     Cancel: Comprehensive metabolic panel; Future; Expected date: 08/19/2020  -     Cancel: CBC auto differential; Future; Expected date: 08/19/2020  -     Ferritin; Future; Expected date: 08/30/2020  -     Calcitriol (1,25 di-OH Vitamin D); Future; Expected date: 08/30/2020  -     TSH w/reflex to FT4; Future; Expected date: 01/30/2021    Labs have been ordered for monitoring of chronic conditions, just before next visit.    Follow up in about 3 months (around 11/19/2020) for DM, Anxiety.        8/30/2020 Fatuma Hollins

## 2020-09-10 ENCOUNTER — TELEPHONE (OUTPATIENT)
Dept: FAMILY MEDICINE | Facility: CLINIC | Age: 65
End: 2020-09-10

## 2020-09-10 NOTE — TELEPHONE ENCOUNTER
----- Message from Lizzy Pittman sent at 9/10/2020  3:15 PM CDT -----  Regarding: Screening Mammogram  Good afternoon.  We have attempted patient regarding scheduling her screening mammogram, negative contact.

## 2020-09-24 DIAGNOSIS — Z12.31 ENCOUNTER FOR SCREENING MAMMOGRAM FOR MALIGNANT NEOPLASM OF BREAST: Primary | ICD-10-CM

## 2020-10-02 ENCOUNTER — TELEPHONE (OUTPATIENT)
Dept: FAMILY MEDICINE | Facility: CLINIC | Age: 65
End: 2020-10-02

## 2020-10-02 NOTE — TELEPHONE ENCOUNTER
Spoke with pt - pt informed that she  Would need to contact the eye doctor and find that information out because we wouldn't have any information on how they do their billing. Pt verbalized an understanding. ABILIO

## 2020-10-02 NOTE — TELEPHONE ENCOUNTER
----- Message from Fabrice Borges sent at 10/2/2020 11:17 AM CDT -----  Regarding: Needs call back from nurse  Contact: Della barksdale  Pt says that the name of her Eye doctor is Dr. Yehuda Dunne at [facility] . She says Dr. Hollins wanted the name of her eye doctor. She doesn't have a vision plan with her insurance, so she would like to know would this would be a medical appt or would she be completely responsible for the bill. She says she is diabetic and she has to be tested for glaucoma every year. Please give patient a call back .   Pt# [phone]

## 2020-10-08 ENCOUNTER — HOSPITAL ENCOUNTER (OUTPATIENT)
Dept: RADIOLOGY | Facility: HOSPITAL | Age: 65
Discharge: HOME | End: 2020-10-08
Attending: FAMILY MEDICINE
Payer: COMMERCIAL

## 2020-10-08 DIAGNOSIS — Z12.31 ENCOUNTER FOR SCREENING MAMMOGRAM FOR MALIGNANT NEOPLASM OF BREAST: ICD-10-CM

## 2020-10-08 PROCEDURE — 77067 SCR MAMMO BI INCL CAD: CPT | Mod: TC,PO

## 2020-10-12 LAB
1,25(OH)2D3 SERPL-MCNC: 51.3 PG/ML (ref 19.9–79.3)
ALBUMIN SERPL-MCNC: 4.3 G/DL (ref 3.8–4.8)
ALBUMIN/CREAT UR: <5 MG/G CREAT (ref 0–29)
ALBUMIN/GLOB SERPL: 1.9 {RATIO} (ref 1.2–2.2)
ALP SERPL-CCNC: 79 IU/L (ref 39–117)
ALT SERPL-CCNC: 17 IU/L (ref 0–32)
AST SERPL-CCNC: 19 IU/L (ref 0–40)
BASOPHILS # BLD AUTO: 0.1 X10E3/UL (ref 0–0.2)
BASOPHILS NFR BLD AUTO: 1 %
BILIRUB SERPL-MCNC: 0.4 MG/DL (ref 0–1.2)
BUN SERPL-MCNC: 15 MG/DL (ref 8–27)
BUN/CREAT SERPL: 18 (ref 12–28)
CALCIUM SERPL-MCNC: 9.2 MG/DL (ref 8.7–10.3)
CHLORIDE SERPL-SCNC: 103 MMOL/L (ref 96–106)
CHOLEST SERPL-MCNC: 225 MG/DL (ref 100–199)
CO2 SERPL-SCNC: 21 MMOL/L (ref 20–29)
CREAT SERPL-MCNC: 0.84 MG/DL (ref 0.57–1)
CREAT UR-MCNC: 59.5 MG/DL
EOSINOPHIL # BLD AUTO: 0.4 X10E3/UL (ref 0–0.4)
EOSINOPHIL NFR BLD AUTO: 3 %
ERYTHROCYTE [DISTWIDTH] IN BLOOD BY AUTOMATED COUNT: 13.9 % (ref 11.7–15.4)
FERRITIN SERPL-MCNC: 67 NG/ML (ref 15–150)
GLOBULIN SER CALC-MCNC: 2.3 G/DL (ref 1.5–4.5)
GLUCOSE SERPL-MCNC: 157 MG/DL (ref 65–99)
HBA1C MFR BLD: 6.8 % (ref 4.8–5.6)
HCT VFR BLD AUTO: 45.5 % (ref 34–46.6)
HDLC SERPL-MCNC: 71 MG/DL
HGB BLD-MCNC: 14.9 G/DL (ref 11.1–15.9)
IMM GRANULOCYTES # BLD AUTO: 0 X10E3/UL (ref 0–0.1)
IMM GRANULOCYTES NFR BLD AUTO: 0 %
LDLC SERPL CALC-MCNC: 135 MG/DL (ref 0–99)
LYMPHOCYTES # BLD AUTO: 2.6 X10E3/UL (ref 0.7–3.1)
LYMPHOCYTES NFR BLD AUTO: 24 %
MCH RBC QN AUTO: 29 PG (ref 26.6–33)
MCHC RBC AUTO-ENTMCNC: 32.7 G/DL (ref 31.5–35.7)
MCV RBC AUTO: 89 FL (ref 79–97)
MICROALBUMIN UR-MCNC: <3 UG/ML
MONOCYTES # BLD AUTO: 0.9 X10E3/UL (ref 0.1–0.9)
MONOCYTES NFR BLD AUTO: 8 %
NEUTROPHILS # BLD AUTO: 6.8 X10E3/UL (ref 1.4–7)
NEUTROPHILS NFR BLD AUTO: 64 %
PLATELET # BLD AUTO: 326 X10E3/UL (ref 150–450)
POTASSIUM SERPL-SCNC: 4.8 MMOL/L (ref 3.5–5.2)
PROT SERPL-MCNC: 6.6 G/DL (ref 6–8.5)
RBC # BLD AUTO: 5.13 X10E6/UL (ref 3.77–5.28)
SODIUM SERPL-SCNC: 138 MMOL/L (ref 134–144)
TRIGL SERPL-MCNC: 108 MG/DL (ref 0–149)
TSH SERPL DL<=0.005 MIU/L-ACNC: 3.34 UIU/ML (ref 0.45–4.5)
VLDLC SERPL CALC-MCNC: 19 MG/DL (ref 5–40)
WBC # BLD AUTO: 10.7 X10E3/UL (ref 3.4–10.8)

## 2020-10-15 ENCOUNTER — TELEPHONE (OUTPATIENT)
Dept: FAMILY MEDICINE | Facility: CLINIC | Age: 65
End: 2020-10-15

## 2020-10-15 NOTE — TELEPHONE ENCOUNTER
----- Message from Fatuma Hollins MD sent at 10/14/2020  6:05 PM CDT -----  Please let the patient know that her mammogram is shows no evidence of malignancy. To repeat in 1-2 years.

## 2020-11-13 ENCOUNTER — TELEPHONE (OUTPATIENT)
Dept: FAMILY MEDICINE | Facility: CLINIC | Age: 65
End: 2020-11-13

## 2020-11-13 NOTE — TELEPHONE ENCOUNTER
----- Message from Edwige Prather sent at 11/13/2020 10:32 AM CST -----  Regarding: lab results  Pt wants her lab results   Pt [phone]

## 2020-11-23 ENCOUNTER — OFFICE VISIT (OUTPATIENT)
Dept: FAMILY MEDICINE | Facility: CLINIC | Age: 65
End: 2020-11-23
Payer: COMMERCIAL

## 2020-11-23 VITALS
BODY MASS INDEX: 39.96 KG/M2 | SYSTOLIC BLOOD PRESSURE: 138 MMHG | HEART RATE: 99 BPM | TEMPERATURE: 99 F | WEIGHT: 239.81 LBS | HEIGHT: 65 IN | DIASTOLIC BLOOD PRESSURE: 88 MMHG | OXYGEN SATURATION: 99 %

## 2020-11-23 DIAGNOSIS — E78.2 MIXED HYPERLIPIDEMIA: ICD-10-CM

## 2020-11-23 DIAGNOSIS — F41.1 GAD (GENERALIZED ANXIETY DISORDER): ICD-10-CM

## 2020-11-23 DIAGNOSIS — E11.9 TYPE 2 DIABETES MELLITUS WITHOUT COMPLICATION, WITHOUT LONG-TERM CURRENT USE OF INSULIN: Primary | ICD-10-CM

## 2020-11-23 DIAGNOSIS — I10 ESSENTIAL HYPERTENSION: ICD-10-CM

## 2020-11-23 DIAGNOSIS — Z12.11 SCREENING FOR COLON CANCER: ICD-10-CM

## 2020-11-23 PROCEDURE — 3075F SYST BP GE 130 - 139MM HG: CPT | Mod: S$GLB,,, | Performed by: FAMILY MEDICINE

## 2020-11-23 PROCEDURE — 3044F HG A1C LEVEL LT 7.0%: CPT | Mod: S$GLB,,, | Performed by: FAMILY MEDICINE

## 2020-11-23 PROCEDURE — 3075F PR MOST RECENT SYSTOLIC BLOOD PRESS GE 130-139MM HG: ICD-10-PCS | Mod: S$GLB,,, | Performed by: FAMILY MEDICINE

## 2020-11-23 PROCEDURE — 3079F PR MOST RECENT DIASTOLIC BLOOD PRESSURE 80-89 MM HG: ICD-10-PCS | Mod: S$GLB,,, | Performed by: FAMILY MEDICINE

## 2020-11-23 PROCEDURE — 99214 PR OFFICE/OUTPT VISIT, EST, LEVL IV, 30-39 MIN: ICD-10-PCS | Mod: S$GLB,,, | Performed by: FAMILY MEDICINE

## 2020-11-23 PROCEDURE — 3008F PR BODY MASS INDEX (BMI) DOCUMENTED: ICD-10-PCS | Mod: S$GLB,,, | Performed by: FAMILY MEDICINE

## 2020-11-23 PROCEDURE — 3079F DIAST BP 80-89 MM HG: CPT | Mod: S$GLB,,, | Performed by: FAMILY MEDICINE

## 2020-11-23 PROCEDURE — 3044F PR MOST RECENT HEMOGLOBIN A1C LEVEL <7.0%: ICD-10-PCS | Mod: S$GLB,,, | Performed by: FAMILY MEDICINE

## 2020-11-23 PROCEDURE — 3008F BODY MASS INDEX DOCD: CPT | Mod: S$GLB,,, | Performed by: FAMILY MEDICINE

## 2020-11-23 PROCEDURE — 99214 OFFICE O/P EST MOD 30 MIN: CPT | Mod: S$GLB,,, | Performed by: FAMILY MEDICINE

## 2020-11-23 RX ORDER — ALPRAZOLAM 0.5 MG/1
0.5 TABLET ORAL 2 TIMES DAILY
Qty: 60 TABLET | Refills: 2 | Status: SHIPPED | OUTPATIENT
Start: 2020-11-23 | End: 2021-02-25 | Stop reason: SDUPTHER

## 2020-11-23 RX ORDER — TELMISARTAN 80 MG/1
80 TABLET ORAL DAILY
Qty: 30 TABLET | Refills: 5 | Status: SHIPPED | OUTPATIENT
Start: 2020-11-23 | End: 2021-02-25 | Stop reason: SDUPTHER

## 2020-11-23 RX ORDER — GLIMEPIRIDE 2 MG/1
2 TABLET ORAL 2 TIMES DAILY
Qty: 180 TABLET | Refills: 1 | Status: SHIPPED | OUTPATIENT
Start: 2020-11-23 | End: 2021-05-27 | Stop reason: SDUPTHER

## 2020-11-23 RX ORDER — METFORMIN HYDROCHLORIDE 500 MG/1
500 TABLET, EXTENDED RELEASE ORAL 2 TIMES DAILY WITH MEALS
Qty: 180 TABLET | Refills: 1 | Status: SHIPPED | OUTPATIENT
Start: 2020-11-23 | End: 2021-02-25 | Stop reason: SDUPTHER

## 2020-11-23 NOTE — PROGRESS NOTES
SUBJECTIVE:    Patient ID: Della Juarez is a 65 y.o. female.    Chief Complaint: Diabetes (flu declined/ cscope stool for occult ordered -JE) and Anxiety    Pt here to checkup on diabetes.    A1c is 6.8%. Has gained about 5 pounds since last visit.     Has been having an occasional drink of wine.   Continues to keep a log of his blood sugars.     Had labs done.  TSH 3.34, . Vitamin D 51    --------------------------------------------------------  Mammogram nl, 10/2020  Refuses colonoscopy, has never had.  Does FOBT yearly due to lab issues.      Office Visit on 08/19/2020   Component Date Value Ref Range Status    Hemoglobin A1C 08/19/2020 6.7  % Final    Ferritin 10/07/2020 67  15 - 150 ng/mL Final    Calcitriol(1,25 di-OH Vit D) 10/07/2020 51.3  19.9 - 79.3 pg/mL Final    TSH 10/07/2020 3.340  0.450 - 4.500 uIU/mL Final    Cholesterol 10/07/2020 225* 100 - 199 mg/dL Final    Triglycerides 10/07/2020 108  0 - 149 mg/dL Final    HDL 10/07/2020 71  >39 mg/dL Final    VLDL Cholesterol Storm 10/07/2020 19  5 - 40 mg/dL Final    LDL Calculated 10/07/2020 135* 0 - 99 mg/dL Final    Glucose 10/07/2020 157* 65 - 99 mg/dL Final    BUN 10/07/2020 15  8 - 27 mg/dL Final    Creatinine 10/07/2020 0.84  0.57 - 1.00 mg/dL Final    eGFR if non African American 10/07/2020 74  >59 mL/min/1.73 Final    eGFR if African American 10/07/2020 86  >59 mL/min/1.73 Final    BUN/Creatinine Ratio 10/07/2020 18  12 - 28 Final    Sodium 10/07/2020 138  134 - 144 mmol/L Final    Potassium 10/07/2020 4.8  3.5 - 5.2 mmol/L Final    Chloride 10/07/2020 103  96 - 106 mmol/L Final    CO2 10/07/2020 21  20 - 29 mmol/L Final    Calcium 10/07/2020 9.2  8.7 - 10.3 mg/dL Final    Protein, Total 10/07/2020 6.6  6.0 - 8.5 g/dL Final    Albumin 10/07/2020 4.3  3.8 - 4.8 g/dL Final    Globulin, Total 10/07/2020 2.3  1.5 - 4.5 g/dL Final    Albumin/Globulin Ratio 10/07/2020 1.9  1.2 - 2.2 Final    Total Bilirubin 10/07/2020 0.4   0.0 - 1.2 mg/dL Final    Alkaline Phosphatase 10/07/2020 79  39 - 117 IU/L Final    AST 10/07/2020 19  0 - 40 IU/L Final    ALT 10/07/2020 17  0 - 32 IU/L Final    WBC 10/07/2020 10.7  3.4 - 10.8 x10E3/uL Final    RBC 10/07/2020 5.13  3.77 - 5.28 x10E6/uL Final    Hemoglobin 10/07/2020 14.9  11.1 - 15.9 g/dL Final    Hematocrit 10/07/2020 45.5  34.0 - 46.6 % Final    MCV 10/07/2020 89  79 - 97 fL Final    MCH 10/07/2020 29.0  26.6 - 33.0 pg Final    MCHC 10/07/2020 32.7  31.5 - 35.7 g/dL Final    RDW 10/07/2020 13.9  11.7 - 15.4 % Final    Platelets 10/07/2020 326  150 - 450 x10E3/uL Final    Neutrophils 10/07/2020 64  Not Estab. % Final    Lymphs 10/07/2020 24  Not Estab. % Final    Monocytes 10/07/2020 8  Not Estab. % Final    Eos 10/07/2020 3  Not Estab. % Final    Basos 10/07/2020 1  Not Estab. % Final    Neutrophils (Absolute) 10/07/2020 6.8  1.4 - 7.0 x10E3/uL Final    Lymphs (Absolute) 10/07/2020 2.6  0.7 - 3.1 x10E3/uL Final    Monocytes(Absolute) 10/07/2020 0.9  0.1 - 0.9 x10E3/uL Final    Eos (Absolute) 10/07/2020 0.4  0.0 - 0.4 x10E3/uL Final    Baso (Absolute) 10/07/2020 0.1  0.0 - 0.2 x10E3/uL Final    Immature Granulocytes 10/07/2020 0  Not Estab. % Final    Immature Grans (Abs) 10/07/2020 0.0  0.0 - 0.1 x10E3/uL Final    Creatinine, Urine 10/07/2020 59.5  Not Estab. mg/dL Final    Microalb, Ur 10/07/2020 <3.0  Not Estab. ug/mL Final    Microalb/Crt. Ratio 10/07/2020 <5  0 - 29 mg/g creat Final    Hemoglobin A1C 10/07/2020 6.8* 4.8 - 5.6 % Final       Past Medical History:   Diagnosis Date    Diabetes mellitus     Hypertension     Mental disorder     Anxiety     Social History     Socioeconomic History    Marital status:      Spouse name: Not on file    Number of children: Not on file    Years of education: Not on file    Highest education level: Not on file   Occupational History    Not on file   Social Needs    Financial resource strain: Not on file     Food insecurity     Worry: Not on file     Inability: Not on file    Transportation needs     Medical: Not on file     Non-medical: Not on file   Tobacco Use    Smoking status: Former Smoker     Packs/day: 0.50     Types: Cigarettes     Start date:      Quit date:      Years since quittin.8    Smokeless tobacco: Never Used   Substance and Sexual Activity    Alcohol use: No    Drug use: No    Sexual activity: Not Currently   Lifestyle    Physical activity     Days per week: Not on file     Minutes per session: Not on file    Stress: Not on file   Relationships    Social connections     Talks on phone: Not on file     Gets together: Not on file     Attends Rastafari service: Not on file     Active member of club or organization: Not on file     Attends meetings of clubs or organizations: Not on file     Relationship status: Not on file   Other Topics Concern    Not on file   Social History Narrative    Not on file     Past Surgical History:   Procedure Laterality Date     SECTION      x3    COSMETIC SURGERY      Scar on right side of face     Family History   Problem Relation Age of Onset    Diabetes Maternal Grandmother     Hypertension Maternal Grandmother     Stroke Maternal Grandmother     Diabetes Father     Hypertension Father     Hemochromatosis Mother     Hypertension Mother     Stroke Mother     Breast cancer Maternal Aunt     Cancer Neg Hx     Colon cancer Neg Hx     Ovarian cancer Neg Hx     Eclampsia Neg Hx     Miscarriages / Stillbirths Neg Hx      labor Neg Hx        Review of patient's allergies indicates:   Allergen Reactions    Penicillins      Other reaction(s): Vomiting  Other reaction(s): Swelling  Other reaction(s): Nausea  Other reaction(s): Hives       Current Outpatient Medications:     ALPRAZolam (XANAX) 0.5 MG tablet, Take 1 tablet (0.5 mg total) by mouth 2 (two) times daily., Disp: 60 tablet, Rfl: 2    aspirin (ECOTRIN) 81 MG EC  "tablet, Take 1 tablet (81 mg total) by mouth once daily., Disp: , Rfl: 0    glimepiride (AMARYL) 2 MG tablet, Take 1 tablet (2 mg total) by mouth 2 (two) times a day. Pt only taking 1 daily, Disp: 180 tablet, Rfl: 1    metFORMIN (GLUCOPHAGE-XR) 500 MG ER 24hr tablet, Take 1 tablet (500 mg total) by mouth 2 (two) times daily with meals., Disp: 180 tablet, Rfl: 1    telmisartan (MICARDIS) 80 MG Tab, Take 1 tablet (80 mg total) by mouth once daily., Disp: 30 tablet, Rfl: 5    estrogen, conjugated,-medroxyprogesterone 0.625-2.5mg (PREMPRO) 0.625-2.5 mg per tablet, Take 1 tablet by mouth once daily., Disp: 30 tablet, Rfl: 11    Review of Systems   Constitutional: Negative for appetite change, fatigue, fever and unexpected weight change.   Respiratory: Negative for cough, chest tightness, shortness of breath and wheezing.    Cardiovascular: Negative for chest pain and leg swelling.   Gastrointestinal: Negative for abdominal pain, constipation, nausea and vomiting.        -heartburn   Genitourinary: Negative for difficulty urinating, dysuria, frequency and urgency.   Musculoskeletal: Negative for arthralgias, back pain, myalgias and neck pain.   Skin: Negative for rash.   Neurological: Negative for dizziness, weakness, numbness and headaches.   Hematological: Does not bruise/bleed easily.   Psychiatric/Behavioral: Negative for dysphoric mood, sleep disturbance and suicidal ideas. The patient is nervous/anxious.    All other systems reviewed and are negative.         Objective:      Vitals:    11/23/20 1125   BP: 138/88   Pulse: 99   Temp: 98.5 °F (36.9 °C)   SpO2: 99%   Weight: 108.8 kg (239 lb 12.8 oz)   Height: 5' 4.5" (1.638 m)     Wt Readings from Last 3 Encounters:   11/23/20 108.8 kg (239 lb 12.8 oz)   08/19/20 106.4 kg (234 lb 9.6 oz)   03/11/20 100.8 kg (222 lb 3.2 oz)       Physical Exam  Vitals signs reviewed.   Constitutional:       General: She is not in acute distress.     Appearance: Normal appearance. " She is well-developed.      Comments: Obese     HENT:      Head: Normocephalic and atraumatic.   Neck:      Musculoskeletal: Neck supple.      Thyroid: No thyromegaly.   Cardiovascular:      Rate and Rhythm: Normal rate and regular rhythm.      Heart sounds: Normal heart sounds. No murmur. No friction rub.   Pulmonary:      Effort: Pulmonary effort is normal.      Breath sounds: Normal breath sounds. No wheezing or rales.   Abdominal:      General: Bowel sounds are normal. There is no distension.      Palpations: Abdomen is soft.      Tenderness: There is no abdominal tenderness.   Lymphadenopathy:      Cervical: No cervical adenopathy.   Skin:     General: Skin is warm and dry.      Findings: No rash.   Neurological:      Mental Status: She is alert and oriented to person, place, and time.   Psychiatric:         Attention and Perception: She is attentive.         Speech: Speech normal.         Behavior: Behavior normal.         Thought Content: Thought content normal.         Judgment: Judgment normal.           Assessment:       1. Type 2 diabetes mellitus without complication, without long-term current use of insulin    2. MELODY (generalized anxiety disorder)    3. Essential hypertension    4. Mixed hyperlipidemia    5. Screening for colon cancer         Plan:       Type 2 diabetes mellitus without complication, without long-term current use of insulin  Comments:  Controlled. A1 6.8.%. To continue ADA diet, regular blood sugar diet, regular exercise. Will continue to monitor A1c.  Orders:  -     metFORMIN (GLUCOPHAGE-XR) 500 MG ER 24hr tablet; Take 1 tablet (500 mg total) by mouth 2 (two) times daily with meals.  Dispense: 180 tablet; Refill: 1  -     glimepiride (AMARYL) 2 MG tablet; Take 1 tablet (2 mg total) by mouth 2 (two) times a day. Pt only taking 1 daily  Dispense: 180 tablet; Refill: 1    MELODY (generalized anxiety disorder)  Comments:  Controlled. Will continue to monitor symptoms.   Orders:  -      ALPRAZolam (XANAX) 0.5 MG tablet; Take 1 tablet (0.5 mg total) by mouth 2 (two) times daily.  Dispense: 60 tablet; Refill: 2    Essential hypertension  Comments:  Blood pressure controlled. Will continue to monitor BP on current medication regimen  Orders:  -     telmisartan (MICARDIS) 80 MG Tab; Take 1 tablet (80 mg total) by mouth once daily.  Dispense: 30 tablet; Refill: 5    Mixed hyperlipidemia  Comments:  Suboptimal. . Pt doesn't want to use statin. Would like to research zetia, nexlitol, and welchol as options.    Screening for colon cancer  Comments:  Pt to get FOBT, though suboptimal test. Notified has to be done yearly.  Orders:  -     Occult Blood, Fecal Immunoassay; Future; Expected date: 11/23/2020    Other  Lab results discussed and reviewed with patient.  Labs have been ordered for monitoring of chronic conditions, just before next visit.    Follow up in about 3 months (around 2/23/2021) for HTN, DM, Anxiety, HLD.        11/23/2020 Fatuma Hollins

## 2021-02-25 ENCOUNTER — OFFICE VISIT (OUTPATIENT)
Dept: FAMILY MEDICINE | Facility: CLINIC | Age: 66
End: 2021-02-25
Payer: COMMERCIAL

## 2021-02-25 VITALS
HEART RATE: 94 BPM | BODY MASS INDEX: 40.48 KG/M2 | HEIGHT: 65 IN | WEIGHT: 243 LBS | SYSTOLIC BLOOD PRESSURE: 134 MMHG | DIASTOLIC BLOOD PRESSURE: 86 MMHG

## 2021-02-25 DIAGNOSIS — F41.1 GAD (GENERALIZED ANXIETY DISORDER): ICD-10-CM

## 2021-02-25 DIAGNOSIS — E11.9 TYPE 2 DIABETES MELLITUS WITHOUT COMPLICATION, WITHOUT LONG-TERM CURRENT USE OF INSULIN: Primary | ICD-10-CM

## 2021-02-25 DIAGNOSIS — I10 ESSENTIAL HYPERTENSION: ICD-10-CM

## 2021-02-25 DIAGNOSIS — Z79.899 LONG-TERM USE OF HIGH-RISK MEDICATION: ICD-10-CM

## 2021-02-25 DIAGNOSIS — E78.2 MIXED HYPERLIPIDEMIA: ICD-10-CM

## 2021-02-25 DIAGNOSIS — Z56.6 WORK STRESS: ICD-10-CM

## 2021-02-25 LAB — HBA1C MFR BLD: 7.8 %

## 2021-02-25 PROCEDURE — 3079F DIAST BP 80-89 MM HG: CPT | Mod: S$GLB,,, | Performed by: FAMILY MEDICINE

## 2021-02-25 PROCEDURE — 3075F SYST BP GE 130 - 139MM HG: CPT | Mod: S$GLB,,, | Performed by: FAMILY MEDICINE

## 2021-02-25 PROCEDURE — 3051F HG A1C>EQUAL 7.0%<8.0%: CPT | Mod: S$GLB,,, | Performed by: FAMILY MEDICINE

## 2021-02-25 PROCEDURE — 3075F PR MOST RECENT SYSTOLIC BLOOD PRESS GE 130-139MM HG: ICD-10-PCS | Mod: S$GLB,,, | Performed by: FAMILY MEDICINE

## 2021-02-25 PROCEDURE — 83036 HEMOGLOBIN GLYCOSYLATED A1C: CPT | Mod: QW,,, | Performed by: FAMILY MEDICINE

## 2021-02-25 PROCEDURE — 99214 OFFICE O/P EST MOD 30 MIN: CPT | Mod: S$GLB,,, | Performed by: FAMILY MEDICINE

## 2021-02-25 PROCEDURE — 3008F PR BODY MASS INDEX (BMI) DOCUMENTED: ICD-10-PCS | Mod: S$GLB,,, | Performed by: FAMILY MEDICINE

## 2021-02-25 PROCEDURE — 83036 POCT HEMOGLOBIN A1C: ICD-10-PCS | Mod: QW,,, | Performed by: FAMILY MEDICINE

## 2021-02-25 PROCEDURE — 3051F PR MOST RECENT HEMOGLOBIN A1C LEVEL 7.0 - < 8.0%: ICD-10-PCS | Mod: S$GLB,,, | Performed by: FAMILY MEDICINE

## 2021-02-25 PROCEDURE — 3079F PR MOST RECENT DIASTOLIC BLOOD PRESSURE 80-89 MM HG: ICD-10-PCS | Mod: S$GLB,,, | Performed by: FAMILY MEDICINE

## 2021-02-25 PROCEDURE — 3008F BODY MASS INDEX DOCD: CPT | Mod: S$GLB,,, | Performed by: FAMILY MEDICINE

## 2021-02-25 PROCEDURE — 99214 PR OFFICE/OUTPT VISIT, EST, LEVL IV, 30-39 MIN: ICD-10-PCS | Mod: S$GLB,,, | Performed by: FAMILY MEDICINE

## 2021-02-25 RX ORDER — METFORMIN HYDROCHLORIDE 500 MG/1
500 TABLET, EXTENDED RELEASE ORAL 2 TIMES DAILY WITH MEALS
Qty: 180 TABLET | Refills: 1 | Status: SHIPPED | OUTPATIENT
Start: 2021-02-25 | End: 2021-05-27 | Stop reason: SDUPTHER

## 2021-02-25 RX ORDER — ZINC GLUCONATE 50 MG
50 TABLET ORAL DAILY
COMMUNITY

## 2021-02-25 RX ORDER — ASCORBIC ACID 500 MG
500 TABLET ORAL DAILY
COMMUNITY

## 2021-02-25 RX ORDER — ALPRAZOLAM 0.5 MG/1
0.5 TABLET ORAL 2 TIMES DAILY
Qty: 60 TABLET | Refills: 2 | Status: SHIPPED | OUTPATIENT
Start: 2021-02-25 | End: 2021-05-27 | Stop reason: SDUPTHER

## 2021-02-25 RX ORDER — TELMISARTAN 80 MG/1
80 TABLET ORAL DAILY
Qty: 30 TABLET | Refills: 5 | Status: CANCELLED | OUTPATIENT
Start: 2021-02-25 | End: 2021-05-26

## 2021-02-25 RX ORDER — CHOLECALCIFEROL (VITAMIN D3) 25 MCG
5000 TABLET ORAL DAILY
COMMUNITY

## 2021-02-25 RX ORDER — TELMISARTAN 80 MG/1
80 TABLET ORAL DAILY
Qty: 90 TABLET | Refills: 1 | Status: SHIPPED | OUTPATIENT
Start: 2021-02-25 | End: 2021-05-27 | Stop reason: SDUPTHER

## 2021-02-25 SDOH — SOCIAL DETERMINANTS OF HEALTH (SDOH): OTHER PHYSICAL AND MENTAL STRAIN RELATED TO WORK: Z56.6

## 2021-03-24 RX ORDER — CICLOPIROX OLAMINE 7.7 MG/100ML
SUSPENSION TOPICAL 2 TIMES DAILY
Qty: 60 ML | Refills: 0 | Status: SHIPPED | OUTPATIENT
Start: 2021-03-24 | End: 2021-05-27

## 2021-03-29 ENCOUNTER — TELEPHONE (OUTPATIENT)
Dept: FAMILY MEDICINE | Facility: CLINIC | Age: 66
End: 2021-03-29

## 2021-03-29 RX ORDER — CICLOPIROX 80 MG/ML
SOLUTION TOPICAL NIGHTLY
Qty: 6.6 ML | Refills: 1 | Status: SHIPPED | OUTPATIENT
Start: 2021-03-29 | End: 2021-05-27 | Stop reason: SDUPTHER

## 2021-05-07 ENCOUNTER — TELEPHONE (OUTPATIENT)
Dept: FAMILY MEDICINE | Facility: CLINIC | Age: 66
End: 2021-05-07

## 2021-05-19 ENCOUNTER — TELEPHONE (OUTPATIENT)
Dept: FAMILY MEDICINE | Facility: CLINIC | Age: 66
End: 2021-05-19

## 2021-05-19 DIAGNOSIS — Z13.0 ENCOUNTER FOR SCREENING FOR HEMATOLOGIC DISORDER: Primary | ICD-10-CM

## 2021-05-22 LAB
ALBUMIN SERPL-MCNC: 4.4 G/DL (ref 3.8–4.8)
ALBUMIN/CREAT UR: <4 MG/G CREAT (ref 0–29)
ALBUMIN/GLOB SERPL: 2 {RATIO} (ref 1.2–2.2)
ALP SERPL-CCNC: 88 IU/L (ref 48–121)
ALT SERPL-CCNC: 22 IU/L (ref 0–32)
APPEARANCE UR: CLEAR
AST SERPL-CCNC: 19 IU/L (ref 0–40)
BACTERIA #/AREA URNS HPF: NORMAL /[HPF]
BASOPHILS # BLD AUTO: 0.1 X10E3/UL (ref 0–0.2)
BASOPHILS NFR BLD AUTO: 1 %
BILIRUB SERPL-MCNC: 0.3 MG/DL (ref 0–1.2)
BILIRUB UR QL STRIP: NEGATIVE
BUN SERPL-MCNC: 19 MG/DL (ref 8–27)
BUN/CREAT SERPL: 22 (ref 12–28)
CALCIUM SERPL-MCNC: 9.3 MG/DL (ref 8.7–10.3)
CASTS URNS QL MICRO: NORMAL /LPF
CHLORIDE SERPL-SCNC: 104 MMOL/L (ref 96–106)
CHOLEST SERPL-MCNC: 210 MG/DL (ref 100–199)
CO2 SERPL-SCNC: 20 MMOL/L (ref 20–29)
COLOR UR: YELLOW
CREAT SERPL-MCNC: 0.85 MG/DL (ref 0.57–1)
CREAT UR-MCNC: 77 MG/DL
EOSINOPHIL # BLD AUTO: 0.4 X10E3/UL (ref 0–0.4)
EOSINOPHIL NFR BLD AUTO: 3 %
EPI CELLS #/AREA URNS HPF: NORMAL /HPF (ref 0–10)
ERYTHROCYTE [DISTWIDTH] IN BLOOD BY AUTOMATED COUNT: 13.1 % (ref 11.7–15.4)
FERRITIN SERPL-MCNC: 51 NG/ML (ref 15–150)
GLOBULIN SER CALC-MCNC: 2.2 G/DL (ref 1.5–4.5)
GLUCOSE SERPL-MCNC: 132 MG/DL (ref 65–99)
GLUCOSE UR QL: NEGATIVE
HBA1C MFR BLD: 6.3 % (ref 4.8–5.6)
HCT VFR BLD AUTO: 48.2 % (ref 34–46.6)
HDLC SERPL-MCNC: 56 MG/DL
HGB BLD-MCNC: 15.8 G/DL (ref 11.1–15.9)
HGB UR QL STRIP: NEGATIVE
IMM GRANULOCYTES # BLD AUTO: 0 X10E3/UL (ref 0–0.1)
IMM GRANULOCYTES NFR BLD AUTO: 0 %
KETONES UR QL STRIP: NEGATIVE
LDLC SERPL CALC-MCNC: 130 MG/DL (ref 0–99)
LEUKOCYTE ESTERASE UR QL STRIP: ABNORMAL
LYMPHOCYTES # BLD AUTO: 2.6 X10E3/UL (ref 0.7–3.1)
LYMPHOCYTES NFR BLD AUTO: 22 %
MCH RBC QN AUTO: 30.3 PG (ref 26.6–33)
MCHC RBC AUTO-ENTMCNC: 32.8 G/DL (ref 31.5–35.7)
MCV RBC AUTO: 92 FL (ref 79–97)
MICRO URNS: ABNORMAL
MICROALBUMIN UR-MCNC: <3 UG/ML
MONOCYTES # BLD AUTO: 0.9 X10E3/UL (ref 0.1–0.9)
MONOCYTES NFR BLD AUTO: 8 %
NEUTROPHILS # BLD AUTO: 7.4 X10E3/UL (ref 1.4–7)
NEUTROPHILS NFR BLD AUTO: 66 %
NITRITE UR QL STRIP: NEGATIVE
PH UR STRIP: 6 [PH] (ref 5–7.5)
PLATELET # BLD AUTO: 284 X10E3/UL (ref 150–450)
POTASSIUM SERPL-SCNC: 4.8 MMOL/L (ref 3.5–5.2)
PROT SERPL-MCNC: 6.6 G/DL (ref 6–8.5)
PROT UR QL STRIP: NEGATIVE
RBC # BLD AUTO: 5.22 X10E6/UL (ref 3.77–5.28)
RBC #/AREA URNS HPF: NORMAL /HPF (ref 0–2)
SODIUM SERPL-SCNC: 141 MMOL/L (ref 134–144)
SP GR UR: 1.01 (ref 1–1.03)
TRIGL SERPL-MCNC: 138 MG/DL (ref 0–149)
UROBILINOGEN UR STRIP-MCNC: 0.2 MG/DL (ref 0.2–1)
VLDLC SERPL CALC-MCNC: 24 MG/DL (ref 5–40)
WBC # BLD AUTO: 11.4 X10E3/UL (ref 3.4–10.8)
WBC #/AREA URNS HPF: NORMAL /HPF (ref 0–5)

## 2021-05-24 ENCOUNTER — TELEPHONE (OUTPATIENT)
Dept: FAMILY MEDICINE | Facility: CLINIC | Age: 66
End: 2021-05-24

## 2021-05-27 ENCOUNTER — OFFICE VISIT (OUTPATIENT)
Dept: FAMILY MEDICINE | Facility: CLINIC | Age: 66
End: 2021-05-27
Payer: COMMERCIAL

## 2021-05-27 VITALS
HEIGHT: 65 IN | BODY MASS INDEX: 38.99 KG/M2 | HEART RATE: 80 BPM | SYSTOLIC BLOOD PRESSURE: 132 MMHG | WEIGHT: 234 LBS | DIASTOLIC BLOOD PRESSURE: 80 MMHG

## 2021-05-27 DIAGNOSIS — F41.1 GAD (GENERALIZED ANXIETY DISORDER): ICD-10-CM

## 2021-05-27 DIAGNOSIS — E78.2 MIXED HYPERLIPIDEMIA: ICD-10-CM

## 2021-05-27 DIAGNOSIS — E11.9 TYPE 2 DIABETES MELLITUS WITHOUT COMPLICATION, WITHOUT LONG-TERM CURRENT USE OF INSULIN: Primary | ICD-10-CM

## 2021-05-27 DIAGNOSIS — I10 ESSENTIAL HYPERTENSION: ICD-10-CM

## 2021-05-27 DIAGNOSIS — Z76.0 MEDICATION REFILL: ICD-10-CM

## 2021-05-27 DIAGNOSIS — L30.9 DERMATITIS: ICD-10-CM

## 2021-05-27 PROCEDURE — 3079F PR MOST RECENT DIASTOLIC BLOOD PRESSURE 80-89 MM HG: ICD-10-PCS | Mod: S$GLB,,, | Performed by: FAMILY MEDICINE

## 2021-05-27 PROCEDURE — 3075F SYST BP GE 130 - 139MM HG: CPT | Mod: S$GLB,,, | Performed by: FAMILY MEDICINE

## 2021-05-27 PROCEDURE — 3079F DIAST BP 80-89 MM HG: CPT | Mod: S$GLB,,, | Performed by: FAMILY MEDICINE

## 2021-05-27 PROCEDURE — 3051F PR MOST RECENT HEMOGLOBIN A1C LEVEL 7.0 - < 8.0%: ICD-10-PCS | Mod: S$GLB,,, | Performed by: FAMILY MEDICINE

## 2021-05-27 PROCEDURE — 3008F BODY MASS INDEX DOCD: CPT | Mod: S$GLB,,, | Performed by: FAMILY MEDICINE

## 2021-05-27 PROCEDURE — 99214 PR OFFICE/OUTPT VISIT, EST, LEVL IV, 30-39 MIN: ICD-10-PCS | Mod: S$GLB,,, | Performed by: FAMILY MEDICINE

## 2021-05-27 PROCEDURE — 3008F PR BODY MASS INDEX (BMI) DOCUMENTED: ICD-10-PCS | Mod: S$GLB,,, | Performed by: FAMILY MEDICINE

## 2021-05-27 PROCEDURE — 3051F HG A1C>EQUAL 7.0%<8.0%: CPT | Mod: S$GLB,,, | Performed by: FAMILY MEDICINE

## 2021-05-27 PROCEDURE — 99214 OFFICE O/P EST MOD 30 MIN: CPT | Mod: S$GLB,,, | Performed by: FAMILY MEDICINE

## 2021-05-27 PROCEDURE — 3075F PR MOST RECENT SYSTOLIC BLOOD PRESS GE 130-139MM HG: ICD-10-PCS | Mod: S$GLB,,, | Performed by: FAMILY MEDICINE

## 2021-05-27 RX ORDER — METFORMIN HYDROCHLORIDE 500 MG/1
500 TABLET, EXTENDED RELEASE ORAL 2 TIMES DAILY WITH MEALS
Qty: 180 TABLET | Refills: 1 | Status: SHIPPED | OUTPATIENT
Start: 2021-05-27 | End: 2021-08-12 | Stop reason: SDUPTHER

## 2021-05-27 RX ORDER — CICLOPIROX 80 MG/ML
SOLUTION TOPICAL NIGHTLY
Qty: 6.6 ML | Refills: 1 | Status: SHIPPED | OUTPATIENT
Start: 2021-05-27 | End: 2022-09-21

## 2021-05-27 RX ORDER — GLIMEPIRIDE 2 MG/1
2 TABLET ORAL 2 TIMES DAILY
Qty: 180 TABLET | Refills: 1 | Status: SHIPPED | OUTPATIENT
Start: 2021-05-27 | End: 2021-08-12 | Stop reason: SDUPTHER

## 2021-05-27 RX ORDER — TELMISARTAN 80 MG/1
80 TABLET ORAL DAILY
Qty: 90 TABLET | Refills: 1 | Status: SHIPPED | OUTPATIENT
Start: 2021-05-27 | End: 2021-08-12 | Stop reason: SDUPTHER

## 2021-05-27 RX ORDER — ALPRAZOLAM 0.5 MG/1
0.5 TABLET ORAL 2 TIMES DAILY
Qty: 60 TABLET | Refills: 2 | Status: SHIPPED | OUTPATIENT
Start: 2021-05-27 | End: 2021-08-12 | Stop reason: SDUPTHER

## 2021-08-12 ENCOUNTER — OFFICE VISIT (OUTPATIENT)
Dept: FAMILY MEDICINE | Facility: CLINIC | Age: 66
End: 2021-08-12
Payer: COMMERCIAL

## 2021-08-12 VITALS
HEIGHT: 65 IN | BODY MASS INDEX: 37.49 KG/M2 | SYSTOLIC BLOOD PRESSURE: 130 MMHG | WEIGHT: 225 LBS | DIASTOLIC BLOOD PRESSURE: 82 MMHG | HEART RATE: 78 BPM

## 2021-08-12 DIAGNOSIS — L98.9 SKIN LESION OF FOOT: ICD-10-CM

## 2021-08-12 DIAGNOSIS — I10 ESSENTIAL HYPERTENSION: Primary | ICD-10-CM

## 2021-08-12 DIAGNOSIS — M79.602 LEFT ARM PAIN: ICD-10-CM

## 2021-08-12 DIAGNOSIS — Z78.0 MENOPAUSE: ICD-10-CM

## 2021-08-12 DIAGNOSIS — Z13.820 SCREENING FOR OSTEOPOROSIS: ICD-10-CM

## 2021-08-12 DIAGNOSIS — Z12.31 SCREENING MAMMOGRAM, ENCOUNTER FOR: ICD-10-CM

## 2021-08-12 DIAGNOSIS — Z79.899 LONG-TERM USE OF HIGH-RISK MEDICATION: ICD-10-CM

## 2021-08-12 DIAGNOSIS — F41.1 GAD (GENERALIZED ANXIETY DISORDER): ICD-10-CM

## 2021-08-12 DIAGNOSIS — E11.9 TYPE 2 DIABETES MELLITUS WITHOUT COMPLICATION, WITHOUT LONG-TERM CURRENT USE OF INSULIN: ICD-10-CM

## 2021-08-12 PROCEDURE — 3079F PR MOST RECENT DIASTOLIC BLOOD PRESSURE 80-89 MM HG: ICD-10-PCS | Mod: S$GLB,,, | Performed by: FAMILY MEDICINE

## 2021-08-12 PROCEDURE — 1160F PR REVIEW ALL MEDS BY PRESCRIBER/CLIN PHARMACIST DOCUMENTED: ICD-10-PCS | Mod: S$GLB,,, | Performed by: FAMILY MEDICINE

## 2021-08-12 PROCEDURE — 1160F RVW MEDS BY RX/DR IN RCRD: CPT | Mod: S$GLB,,, | Performed by: FAMILY MEDICINE

## 2021-08-12 PROCEDURE — 3075F SYST BP GE 130 - 139MM HG: CPT | Mod: S$GLB,,, | Performed by: FAMILY MEDICINE

## 2021-08-12 PROCEDURE — 3051F PR MOST RECENT HEMOGLOBIN A1C LEVEL 7.0 - < 8.0%: ICD-10-PCS | Mod: S$GLB,,, | Performed by: FAMILY MEDICINE

## 2021-08-12 PROCEDURE — 3075F PR MOST RECENT SYSTOLIC BLOOD PRESS GE 130-139MM HG: ICD-10-PCS | Mod: S$GLB,,, | Performed by: FAMILY MEDICINE

## 2021-08-12 PROCEDURE — 99214 PR OFFICE/OUTPT VISIT, EST, LEVL IV, 30-39 MIN: ICD-10-PCS | Mod: S$GLB,,, | Performed by: FAMILY MEDICINE

## 2021-08-12 PROCEDURE — 3008F PR BODY MASS INDEX (BMI) DOCUMENTED: ICD-10-PCS | Mod: S$GLB,,, | Performed by: FAMILY MEDICINE

## 2021-08-12 PROCEDURE — 99214 OFFICE O/P EST MOD 30 MIN: CPT | Mod: S$GLB,,, | Performed by: FAMILY MEDICINE

## 2021-08-12 PROCEDURE — 3051F HG A1C>EQUAL 7.0%<8.0%: CPT | Mod: S$GLB,,, | Performed by: FAMILY MEDICINE

## 2021-08-12 PROCEDURE — 3008F BODY MASS INDEX DOCD: CPT | Mod: S$GLB,,, | Performed by: FAMILY MEDICINE

## 2021-08-12 PROCEDURE — 3079F DIAST BP 80-89 MM HG: CPT | Mod: S$GLB,,, | Performed by: FAMILY MEDICINE

## 2021-08-12 RX ORDER — ALPRAZOLAM 0.5 MG/1
0.5 TABLET ORAL 2 TIMES DAILY
Qty: 60 TABLET | Refills: 2 | Status: SHIPPED | OUTPATIENT
Start: 2021-08-12 | End: 2021-11-17 | Stop reason: SDUPTHER

## 2021-08-12 RX ORDER — METFORMIN HYDROCHLORIDE 500 MG/1
500 TABLET, EXTENDED RELEASE ORAL 2 TIMES DAILY WITH MEALS
Qty: 180 TABLET | Refills: 1 | Status: SHIPPED | OUTPATIENT
Start: 2021-08-12 | End: 2021-11-17 | Stop reason: SDUPTHER

## 2021-08-12 RX ORDER — WITCH HAZEL 50 %
2000 PADS, MEDICATED (EA) TOPICAL DAILY
COMMUNITY

## 2021-08-12 RX ORDER — GLIMEPIRIDE 2 MG/1
2 TABLET ORAL 2 TIMES DAILY
Qty: 180 TABLET | Refills: 1 | Status: SHIPPED | OUTPATIENT
Start: 2021-08-12 | End: 2021-11-17 | Stop reason: SDUPTHER

## 2021-08-12 RX ORDER — TELMISARTAN 80 MG/1
80 TABLET ORAL DAILY
Qty: 90 TABLET | Refills: 1 | Status: SHIPPED | OUTPATIENT
Start: 2021-08-12 | End: 2021-11-17 | Stop reason: SDUPTHER

## 2021-08-12 RX ORDER — GLIMEPIRIDE 2 MG/1
2 TABLET ORAL 2 TIMES DAILY
Qty: 180 TABLET | Refills: 1 | Status: SHIPPED | OUTPATIENT
Start: 2021-08-12 | End: 2021-08-12

## 2021-08-12 RX ORDER — METFORMIN HYDROCHLORIDE 500 MG/1
500 TABLET, EXTENDED RELEASE ORAL 2 TIMES DAILY WITH MEALS
Qty: 180 TABLET | Refills: 1 | Status: SHIPPED | OUTPATIENT
Start: 2021-08-12 | End: 2021-08-12

## 2021-08-12 RX ORDER — TELMISARTAN 80 MG/1
80 TABLET ORAL DAILY
Qty: 90 TABLET | Refills: 1 | Status: SHIPPED | OUTPATIENT
Start: 2021-08-12 | End: 2021-08-12

## 2021-08-13 ENCOUNTER — TELEPHONE (OUTPATIENT)
Dept: FAMILY MEDICINE | Facility: CLINIC | Age: 66
End: 2021-08-13

## 2021-08-16 ENCOUNTER — TELEPHONE (OUTPATIENT)
Dept: FAMILY MEDICINE | Facility: CLINIC | Age: 66
End: 2021-08-16

## 2021-08-16 ENCOUNTER — HOSPITAL ENCOUNTER (OUTPATIENT)
Dept: RADIOLOGY | Facility: HOSPITAL | Age: 66
Discharge: HOME | End: 2021-08-16
Attending: FAMILY MEDICINE
Payer: COMMERCIAL

## 2021-08-16 DIAGNOSIS — Z78.0 MENOPAUSE: ICD-10-CM

## 2021-08-16 DIAGNOSIS — Z13.820 SCREENING FOR OSTEOPOROSIS: ICD-10-CM

## 2021-08-16 DIAGNOSIS — M79.602 LEFT ARM PAIN: ICD-10-CM

## 2021-08-16 PROCEDURE — 77080 DXA BONE DENSITY AXIAL: CPT | Mod: TC,PO

## 2021-08-16 PROCEDURE — 76882 US LMTD JT/FCL EVL NVASC XTR: CPT | Mod: TC,PO,LT

## 2021-08-24 ENCOUNTER — TELEPHONE (OUTPATIENT)
Dept: FAMILY MEDICINE | Facility: CLINIC | Age: 66
End: 2021-08-24

## 2021-08-27 ENCOUNTER — TELEPHONE (OUTPATIENT)
Dept: DERMATOLOGY | Facility: CLINIC | Age: 66
End: 2021-08-27

## 2021-09-10 ENCOUNTER — OFFICE VISIT (OUTPATIENT)
Dept: DERMATOLOGY | Facility: CLINIC | Age: 66
End: 2021-09-10
Payer: COMMERCIAL

## 2021-09-10 VITALS — BODY MASS INDEX: 37.49 KG/M2 | WEIGHT: 225 LBS | HEIGHT: 65 IN

## 2021-09-10 DIAGNOSIS — L28.0 LICHEN SIMPLEX CHRONICUS: Primary | ICD-10-CM

## 2021-09-10 DIAGNOSIS — L29.9 SCALP PRURITUS: ICD-10-CM

## 2021-09-10 DIAGNOSIS — L98.9 SKIN LESION OF FOOT: ICD-10-CM

## 2021-09-10 PROCEDURE — 3066F NEPHROPATHY DOC TX: CPT | Mod: CPTII,S$GLB,, | Performed by: DERMATOLOGY

## 2021-09-10 PROCEDURE — 3066F PR DOCUMENTATION OF TREATMENT FOR NEPHROPATHY: ICD-10-PCS | Mod: CPTII,S$GLB,, | Performed by: DERMATOLOGY

## 2021-09-10 PROCEDURE — 99204 PR OFFICE/OUTPT VISIT, NEW, LEVL IV, 45-59 MIN: ICD-10-PCS | Mod: S$GLB,,, | Performed by: DERMATOLOGY

## 2021-09-10 PROCEDURE — 1159F MED LIST DOCD IN RCRD: CPT | Mod: CPTII,S$GLB,, | Performed by: DERMATOLOGY

## 2021-09-10 PROCEDURE — 3061F PR NEG MICROALBUMINURIA RESULT DOCUMENTED/REVIEW: ICD-10-PCS | Mod: CPTII,S$GLB,, | Performed by: DERMATOLOGY

## 2021-09-10 PROCEDURE — 3051F PR MOST RECENT HEMOGLOBIN A1C LEVEL 7.0 - < 8.0%: ICD-10-PCS | Mod: CPTII,S$GLB,, | Performed by: DERMATOLOGY

## 2021-09-10 PROCEDURE — 3061F NEG MICROALBUMINURIA REV: CPT | Mod: CPTII,S$GLB,, | Performed by: DERMATOLOGY

## 2021-09-10 PROCEDURE — 1159F PR MEDICATION LIST DOCUMENTED IN MEDICAL RECORD: ICD-10-PCS | Mod: CPTII,S$GLB,, | Performed by: DERMATOLOGY

## 2021-09-10 PROCEDURE — 4010F PR ACE/ARB THEARPY RXD/TAKEN: ICD-10-PCS | Mod: CPTII,S$GLB,, | Performed by: DERMATOLOGY

## 2021-09-10 PROCEDURE — 87220 PR  TISSUE EXAM BY KOH: ICD-10-PCS | Mod: S$GLB,,, | Performed by: DERMATOLOGY

## 2021-09-10 PROCEDURE — 87220 TISSUE EXAM FOR FUNGI: CPT | Mod: S$GLB,,, | Performed by: DERMATOLOGY

## 2021-09-10 PROCEDURE — 1160F PR REVIEW ALL MEDS BY PRESCRIBER/CLIN PHARMACIST DOCUMENTED: ICD-10-PCS | Mod: CPTII,S$GLB,, | Performed by: DERMATOLOGY

## 2021-09-10 PROCEDURE — 99999 PR PBB SHADOW E&M-EST. PATIENT-LVL III: CPT | Mod: PBBFAC,,, | Performed by: DERMATOLOGY

## 2021-09-10 PROCEDURE — 99204 OFFICE O/P NEW MOD 45 MIN: CPT | Mod: S$GLB,,, | Performed by: DERMATOLOGY

## 2021-09-10 PROCEDURE — 3051F HG A1C>EQUAL 7.0%<8.0%: CPT | Mod: CPTII,S$GLB,, | Performed by: DERMATOLOGY

## 2021-09-10 PROCEDURE — 4010F ACE/ARB THERAPY RXD/TAKEN: CPT | Mod: CPTII,S$GLB,, | Performed by: DERMATOLOGY

## 2021-09-10 PROCEDURE — 1160F RVW MEDS BY RX/DR IN RCRD: CPT | Mod: CPTII,S$GLB,, | Performed by: DERMATOLOGY

## 2021-09-10 PROCEDURE — 3008F BODY MASS INDEX DOCD: CPT | Mod: CPTII,S$GLB,, | Performed by: DERMATOLOGY

## 2021-09-10 PROCEDURE — 99999 PR PBB SHADOW E&M-EST. PATIENT-LVL III: ICD-10-PCS | Mod: PBBFAC,,, | Performed by: DERMATOLOGY

## 2021-09-10 PROCEDURE — 3008F PR BODY MASS INDEX (BMI) DOCUMENTED: ICD-10-PCS | Mod: CPTII,S$GLB,, | Performed by: DERMATOLOGY

## 2021-09-10 RX ORDER — CLOBETASOL PROPIONATE 0.5 MG/G
CREAM TOPICAL
Qty: 30 G | Refills: 1 | Status: SHIPPED | OUTPATIENT
Start: 2021-09-10 | End: 2023-03-29

## 2021-09-10 RX ORDER — BETAMETHASONE DIPROPIONATE 0.5 MG/G
LOTION TOPICAL
Qty: 60 ML | Refills: 2 | Status: SHIPPED | OUTPATIENT
Start: 2021-09-10 | End: 2022-09-21

## 2021-09-27 ENCOUNTER — TELEPHONE (OUTPATIENT)
Dept: FAMILY MEDICINE | Facility: CLINIC | Age: 66
End: 2021-09-27

## 2021-09-29 ENCOUNTER — TELEPHONE (OUTPATIENT)
Dept: FAMILY MEDICINE | Facility: CLINIC | Age: 66
End: 2021-09-29

## 2021-09-30 ENCOUNTER — TELEPHONE (OUTPATIENT)
Dept: DERMATOLOGY | Facility: CLINIC | Age: 66
End: 2021-09-30

## 2021-10-04 ENCOUNTER — PATIENT MESSAGE (OUTPATIENT)
Dept: DERMATOLOGY | Facility: CLINIC | Age: 66
End: 2021-10-04

## 2021-10-11 ENCOUNTER — HOSPITAL ENCOUNTER (OUTPATIENT)
Dept: RADIOLOGY | Facility: HOSPITAL | Age: 66
Discharge: HOME | End: 2021-10-11
Attending: FAMILY MEDICINE
Payer: COMMERCIAL

## 2021-10-11 VITALS — WEIGHT: 225.06 LBS | HEIGHT: 65 IN | BODY MASS INDEX: 37.5 KG/M2

## 2021-10-11 DIAGNOSIS — Z12.31 SCREENING MAMMOGRAM, ENCOUNTER FOR: ICD-10-CM

## 2021-10-11 PROCEDURE — 77067 SCR MAMMO BI INCL CAD: CPT | Mod: TC,PO

## 2021-10-22 LAB — HEMOCCULT STL QL IA: NEGATIVE

## 2021-11-03 ENCOUNTER — TELEPHONE (OUTPATIENT)
Dept: FAMILY MEDICINE | Facility: CLINIC | Age: 66
End: 2021-11-03
Payer: COMMERCIAL

## 2021-11-04 ENCOUNTER — PATIENT MESSAGE (OUTPATIENT)
Dept: FAMILY MEDICINE | Facility: CLINIC | Age: 66
End: 2021-11-04
Payer: COMMERCIAL

## 2021-11-17 ENCOUNTER — OFFICE VISIT (OUTPATIENT)
Dept: FAMILY MEDICINE | Facility: CLINIC | Age: 66
End: 2021-11-17
Payer: COMMERCIAL

## 2021-11-17 VITALS
SYSTOLIC BLOOD PRESSURE: 138 MMHG | BODY MASS INDEX: 36.82 KG/M2 | HEART RATE: 72 BPM | HEIGHT: 65 IN | DIASTOLIC BLOOD PRESSURE: 88 MMHG | WEIGHT: 221 LBS

## 2021-11-17 DIAGNOSIS — I10 ESSENTIAL HYPERTENSION: ICD-10-CM

## 2021-11-17 DIAGNOSIS — E78.2 MIXED HYPERLIPIDEMIA: ICD-10-CM

## 2021-11-17 DIAGNOSIS — L84 CALLUS OF FOOT: ICD-10-CM

## 2021-11-17 DIAGNOSIS — E11.9 TYPE 2 DIABETES MELLITUS WITHOUT COMPLICATION, WITHOUT LONG-TERM CURRENT USE OF INSULIN: Primary | ICD-10-CM

## 2021-11-17 DIAGNOSIS — F41.1 GAD (GENERALIZED ANXIETY DISORDER): ICD-10-CM

## 2021-11-17 LAB — HBA1C MFR BLD: 6 %

## 2021-11-17 PROCEDURE — 3051F HG A1C>EQUAL 7.0%<8.0%: CPT | Mod: S$GLB,,, | Performed by: FAMILY MEDICINE

## 2021-11-17 PROCEDURE — 3075F PR MOST RECENT SYSTOLIC BLOOD PRESS GE 130-139MM HG: ICD-10-PCS | Mod: S$GLB,,, | Performed by: FAMILY MEDICINE

## 2021-11-17 PROCEDURE — 3061F NEG MICROALBUMINURIA REV: CPT | Mod: S$GLB,,, | Performed by: FAMILY MEDICINE

## 2021-11-17 PROCEDURE — 3079F PR MOST RECENT DIASTOLIC BLOOD PRESSURE 80-89 MM HG: ICD-10-PCS | Mod: S$GLB,,, | Performed by: FAMILY MEDICINE

## 2021-11-17 PROCEDURE — 3079F DIAST BP 80-89 MM HG: CPT | Mod: S$GLB,,, | Performed by: FAMILY MEDICINE

## 2021-11-17 PROCEDURE — 3008F BODY MASS INDEX DOCD: CPT | Mod: S$GLB,,, | Performed by: FAMILY MEDICINE

## 2021-11-17 PROCEDURE — 3075F SYST BP GE 130 - 139MM HG: CPT | Mod: S$GLB,,, | Performed by: FAMILY MEDICINE

## 2021-11-17 PROCEDURE — 3051F PR MOST RECENT HEMOGLOBIN A1C LEVEL 7.0 - < 8.0%: ICD-10-PCS | Mod: S$GLB,,, | Performed by: FAMILY MEDICINE

## 2021-11-17 PROCEDURE — 99214 PR OFFICE/OUTPT VISIT, EST, LEVL IV, 30-39 MIN: ICD-10-PCS | Mod: S$GLB,,, | Performed by: FAMILY MEDICINE

## 2021-11-17 PROCEDURE — 4010F ACE/ARB THERAPY RXD/TAKEN: CPT | Mod: S$GLB,,, | Performed by: FAMILY MEDICINE

## 2021-11-17 PROCEDURE — 83036 HEMOGLOBIN GLYCOSYLATED A1C: CPT | Mod: QW,,, | Performed by: FAMILY MEDICINE

## 2021-11-17 PROCEDURE — 3008F PR BODY MASS INDEX (BMI) DOCUMENTED: ICD-10-PCS | Mod: S$GLB,,, | Performed by: FAMILY MEDICINE

## 2021-11-17 PROCEDURE — 99214 OFFICE O/P EST MOD 30 MIN: CPT | Mod: S$GLB,,, | Performed by: FAMILY MEDICINE

## 2021-11-17 PROCEDURE — 3066F PR DOCUMENTATION OF TREATMENT FOR NEPHROPATHY: ICD-10-PCS | Mod: S$GLB,,, | Performed by: FAMILY MEDICINE

## 2021-11-17 PROCEDURE — 3066F NEPHROPATHY DOC TX: CPT | Mod: S$GLB,,, | Performed by: FAMILY MEDICINE

## 2021-11-17 PROCEDURE — 4010F PR ACE/ARB THEARPY RXD/TAKEN: ICD-10-PCS | Mod: S$GLB,,, | Performed by: FAMILY MEDICINE

## 2021-11-17 PROCEDURE — 3061F PR NEG MICROALBUMINURIA RESULT DOCUMENTED/REVIEW: ICD-10-PCS | Mod: S$GLB,,, | Performed by: FAMILY MEDICINE

## 2021-11-17 PROCEDURE — 1160F PR REVIEW ALL MEDS BY PRESCRIBER/CLIN PHARMACIST DOCUMENTED: ICD-10-PCS | Mod: S$GLB,,, | Performed by: FAMILY MEDICINE

## 2021-11-17 PROCEDURE — 1160F RVW MEDS BY RX/DR IN RCRD: CPT | Mod: S$GLB,,, | Performed by: FAMILY MEDICINE

## 2021-11-17 PROCEDURE — 83036 POCT HEMOGLOBIN A1C: ICD-10-PCS | Mod: QW,,, | Performed by: FAMILY MEDICINE

## 2021-11-17 RX ORDER — METFORMIN HYDROCHLORIDE 500 MG/1
500 TABLET, EXTENDED RELEASE ORAL 2 TIMES DAILY WITH MEALS
Qty: 180 TABLET | Refills: 1 | Status: SHIPPED | OUTPATIENT
Start: 2021-11-17 | End: 2022-02-23 | Stop reason: SDUPTHER

## 2021-11-17 RX ORDER — ALPRAZOLAM 0.5 MG/1
0.5 TABLET ORAL 2 TIMES DAILY
Qty: 60 TABLET | Refills: 2 | Status: SHIPPED | OUTPATIENT
Start: 2021-11-17 | End: 2022-02-23 | Stop reason: SDUPTHER

## 2021-11-17 RX ORDER — TELMISARTAN 80 MG/1
80 TABLET ORAL DAILY
Qty: 90 TABLET | Refills: 1 | Status: SHIPPED | OUTPATIENT
Start: 2021-11-17 | End: 2022-02-23 | Stop reason: SDUPTHER

## 2021-11-17 RX ORDER — GLIMEPIRIDE 2 MG/1
2 TABLET ORAL 2 TIMES DAILY
Qty: 180 TABLET | Refills: 1 | Status: SHIPPED | OUTPATIENT
Start: 2021-11-17 | End: 2022-02-23 | Stop reason: SDUPTHER

## 2022-02-10 ENCOUNTER — TELEPHONE (OUTPATIENT)
Dept: FAMILY MEDICINE | Facility: CLINIC | Age: 67
End: 2022-02-10
Payer: COMMERCIAL

## 2022-02-10 DIAGNOSIS — Z79.899 ENCOUNTER FOR LONG-TERM (CURRENT) USE OF MEDICATIONS: Primary | ICD-10-CM

## 2022-02-10 DIAGNOSIS — I10 ESSENTIAL HYPERTENSION: ICD-10-CM

## 2022-02-10 DIAGNOSIS — E11.9 TYPE 2 DIABETES MELLITUS WITHOUT COMPLICATION, WITHOUT LONG-TERM CURRENT USE OF INSULIN: ICD-10-CM

## 2022-02-10 DIAGNOSIS — E78.2 MIXED HYPERLIPIDEMIA: ICD-10-CM

## 2022-02-10 NOTE — TELEPHONE ENCOUNTER
----- Message from Kyra Gerber sent at 2/10/2022  2:50 PM CST -----  Pt calling wants to know does she need to do more labs before her upcoming appt. Said they she always uses [facility].  She wants to know why she cannot view the labs she has always used [facility] in the past and has not this problem

## 2022-02-10 NOTE — TELEPHONE ENCOUNTER
----- Message from Yvette Orr sent at 2/10/2022  2:54 PM CST -----  Pt has an appt on the 23rd and would like to know if she needs blood work done  [facility]   [phone]

## 2022-02-23 ENCOUNTER — OFFICE VISIT (OUTPATIENT)
Dept: FAMILY MEDICINE | Facility: CLINIC | Age: 67
End: 2022-02-23
Payer: COMMERCIAL

## 2022-02-23 VITALS
SYSTOLIC BLOOD PRESSURE: 150 MMHG | WEIGHT: 214 LBS | HEART RATE: 86 BPM | BODY MASS INDEX: 35.65 KG/M2 | HEIGHT: 65 IN | DIASTOLIC BLOOD PRESSURE: 84 MMHG | OXYGEN SATURATION: 97 %

## 2022-02-23 DIAGNOSIS — F41.1 GAD (GENERALIZED ANXIETY DISORDER): ICD-10-CM

## 2022-02-23 DIAGNOSIS — E11.9 TYPE 2 DIABETES MELLITUS WITHOUT COMPLICATION, WITHOUT LONG-TERM CURRENT USE OF INSULIN: Primary | ICD-10-CM

## 2022-02-23 DIAGNOSIS — I10 ESSENTIAL HYPERTENSION: ICD-10-CM

## 2022-02-23 DIAGNOSIS — M25.561 ACUTE PAIN OF RIGHT KNEE: ICD-10-CM

## 2022-02-23 DIAGNOSIS — E78.2 MIXED HYPERLIPIDEMIA: ICD-10-CM

## 2022-02-23 PROCEDURE — 3008F BODY MASS INDEX DOCD: CPT | Mod: S$GLB,,, | Performed by: FAMILY MEDICINE

## 2022-02-23 PROCEDURE — 1101F PT FALLS ASSESS-DOCD LE1/YR: CPT | Mod: S$GLB,,, | Performed by: FAMILY MEDICINE

## 2022-02-23 PROCEDURE — 3288F PR FALLS RISK ASSESSMENT DOCUMENTED: ICD-10-PCS | Mod: S$GLB,,, | Performed by: FAMILY MEDICINE

## 2022-02-23 PROCEDURE — 1160F PR REVIEW ALL MEDS BY PRESCRIBER/CLIN PHARMACIST DOCUMENTED: ICD-10-PCS | Mod: S$GLB,,, | Performed by: FAMILY MEDICINE

## 2022-02-23 PROCEDURE — 3288F FALL RISK ASSESSMENT DOCD: CPT | Mod: S$GLB,,, | Performed by: FAMILY MEDICINE

## 2022-02-23 PROCEDURE — 4010F PR ACE/ARB THEARPY RXD/TAKEN: ICD-10-PCS | Mod: S$GLB,,, | Performed by: FAMILY MEDICINE

## 2022-02-23 PROCEDURE — 4010F ACE/ARB THERAPY RXD/TAKEN: CPT | Mod: S$GLB,,, | Performed by: FAMILY MEDICINE

## 2022-02-23 PROCEDURE — 99214 PR OFFICE/OUTPT VISIT, EST, LEVL IV, 30-39 MIN: ICD-10-PCS | Mod: S$GLB,,, | Performed by: FAMILY MEDICINE

## 2022-02-23 PROCEDURE — 3077F SYST BP >= 140 MM HG: CPT | Mod: S$GLB,,, | Performed by: FAMILY MEDICINE

## 2022-02-23 PROCEDURE — 1160F RVW MEDS BY RX/DR IN RCRD: CPT | Mod: S$GLB,,, | Performed by: FAMILY MEDICINE

## 2022-02-23 PROCEDURE — 99214 OFFICE O/P EST MOD 30 MIN: CPT | Mod: S$GLB,,, | Performed by: FAMILY MEDICINE

## 2022-02-23 PROCEDURE — 3077F PR MOST RECENT SYSTOLIC BLOOD PRESSURE >= 140 MM HG: ICD-10-PCS | Mod: S$GLB,,, | Performed by: FAMILY MEDICINE

## 2022-02-23 PROCEDURE — 3008F PR BODY MASS INDEX (BMI) DOCUMENTED: ICD-10-PCS | Mod: S$GLB,,, | Performed by: FAMILY MEDICINE

## 2022-02-23 PROCEDURE — 3079F PR MOST RECENT DIASTOLIC BLOOD PRESSURE 80-89 MM HG: ICD-10-PCS | Mod: S$GLB,,, | Performed by: FAMILY MEDICINE

## 2022-02-23 PROCEDURE — 3079F DIAST BP 80-89 MM HG: CPT | Mod: S$GLB,,, | Performed by: FAMILY MEDICINE

## 2022-02-23 PROCEDURE — 1101F PR PT FALLS ASSESS DOC 0-1 FALLS W/OUT INJ PAST YR: ICD-10-PCS | Mod: S$GLB,,, | Performed by: FAMILY MEDICINE

## 2022-02-23 RX ORDER — TELMISARTAN 80 MG/1
80 TABLET ORAL DAILY
Qty: 90 TABLET | Refills: 1 | Status: SHIPPED | OUTPATIENT
Start: 2022-02-23 | End: 2022-06-08 | Stop reason: SDUPTHER

## 2022-02-23 RX ORDER — GLIMEPIRIDE 2 MG/1
2 TABLET ORAL 2 TIMES DAILY
Qty: 180 TABLET | Refills: 1 | Status: SHIPPED | OUTPATIENT
Start: 2022-02-23 | End: 2022-06-08 | Stop reason: SDUPTHER

## 2022-02-23 RX ORDER — ALPRAZOLAM 0.5 MG/1
0.5 TABLET ORAL 2 TIMES DAILY
Qty: 60 TABLET | Refills: 2 | Status: SHIPPED | OUTPATIENT
Start: 2022-02-23 | End: 2022-06-08 | Stop reason: SDUPTHER

## 2022-02-23 RX ORDER — METFORMIN HYDROCHLORIDE 500 MG/1
500 TABLET, EXTENDED RELEASE ORAL 2 TIMES DAILY WITH MEALS
Qty: 180 TABLET | Refills: 1 | Status: SHIPPED | OUTPATIENT
Start: 2022-02-23 | End: 2022-06-08 | Stop reason: SDUPTHER

## 2022-02-23 NOTE — PROGRESS NOTES
SUBJECTIVE:    Patient ID: Della Juarez is a 66 y.o. female.    Chief Complaint: 3 month follow up (No bottles - Cannot take Flu Vaccine - )    Pt here to checkup on acute and chronic conditions.    A1c is 6.5%. Has lost 10 pounds since last visit. Still walking for exercise and watching diet.  Working 2 jobs at the time.     BP is elevated.    Pt not taking statin due to not wanting to in preference to working on diet and exercise.    Had labs done: GFR 76, , TSH 4.23    Continues to have some anxiety, and takes xanax as needed. Has been sleeping ok.    Was having pain in the right knee that started in December. At the time, was bending down a lot while at work getting things from under the counter in a frog squatting position. But now cant push herself up, without the use of her arm. No longer having pain, now having tingling. Denies trauma. Tried taking advil without relief.   --------------------------------------------------------  Mammogram nl, 10/2021.                      Refuses colonoscopy.   Does FOBT yearly due to lab issues.  Cannot take flu shot-got flu last time she took it.      Office Visit on 2021   Component Date Value Ref Range Status    Hemoglobin A1C 2021 6.0  % Final       Past Medical History:   Diagnosis Date    Diabetes mellitus     Hypertension     Mental disorder     Anxiety     Social History     Socioeconomic History    Marital status:    Tobacco Use    Smoking status: Former Smoker     Packs/day: 0.50     Types: Cigarettes     Start date:      Quit date: 2016     Years since quittin.1    Smokeless tobacco: Never Used   Substance and Sexual Activity    Alcohol use: No    Drug use: No    Sexual activity: Not Currently     Past Surgical History:   Procedure Laterality Date     SECTION      x3    COSMETIC SURGERY      Scar on right side of face     Family History   Problem Relation Age of Onset    Diabetes Maternal Grandmother      Hypertension Maternal Grandmother     Stroke Maternal Grandmother     Diabetes Father     Hypertension Father     Hemochromatosis Mother     Hypertension Mother     Stroke Mother     Breast cancer Paternal Aunt     Cancer Neg Hx     Colon cancer Neg Hx     Ovarian cancer Neg Hx     Eclampsia Neg Hx     Miscarriages / Stillbirths Neg Hx      labor Neg Hx        Review of patient's allergies indicates:   Allergen Reactions    Penicillins      Other reaction(s): Vomiting  Other reaction(s): Swelling  Other reaction(s): Nausea  Other reaction(s): Hives       Current Outpatient Medications:     ALPRAZolam (XANAX) 0.5 MG tablet, Take 1 tablet (0.5 mg total) by mouth 2 (two) times daily., Disp: 60 tablet, Rfl: 2    ascorbic acid, vitamin C, (VITAMIN C) 500 MG tablet, Take 500 mg by mouth once daily., Disp: , Rfl:     aspirin (ECOTRIN) 81 MG EC tablet, Take 1 tablet (81 mg total) by mouth once daily., Disp: , Rfl: 0    betamethasone dipropionate (DIPROLENE) 0.05 % lotion, aaa scalp QHS prn itch, Disp: 60 mL, Rfl: 2    ciclopirox (PENLAC) 8 % Soln, Apply topically nightly., Disp: 6.6 mL, Rfl: 1    clobetasoL (TEMOVATE) 0.05 % cream, aaa on dorsal foot BID until resolution, Disp: 30 g, Rfl: 1    cyanocobalamin 2000 MCG tablet, Take 2,000 mcg by mouth once daily., Disp: , Rfl:     glimepiride (AMARYL) 2 MG tablet, Take 1 tablet (2 mg total) by mouth 2 (two) times a day., Disp: 180 tablet, Rfl: 1    metFORMIN (GLUCOPHAGE-XR) 500 MG ER 24hr tablet, Take 1 tablet (500 mg total) by mouth 2 (two) times daily with meals., Disp: 180 tablet, Rfl: 1    multivitamin capsule, Take 1 capsule by mouth once daily., Disp: , Rfl:     telmisartan (MICARDIS) 80 MG Tab, Take 1 tablet (80 mg total) by mouth once daily., Disp: 90 tablet, Rfl: 1    vitamin D (VITAMIN D3) 1000 units Tab, Take 5,000 Units by mouth once daily., Disp: , Rfl:     zinc gluconate 50 mg tablet, Take 50 mg by mouth once daily., Disp: , Rfl:  "    Review of Systems   Constitutional: Negative for appetite change, fatigue, fever and unexpected weight change.   Respiratory: Negative for cough, chest tightness, shortness of breath and wheezing.    Cardiovascular: Negative for chest pain and leg swelling.   Gastrointestinal: Negative for abdominal pain, constipation, nausea and vomiting.        -heartburn   Genitourinary: Negative for difficulty urinating, dysuria, frequency and urgency.   Musculoskeletal: Negative for arthralgias, back pain, myalgias and neck pain.   Skin: Negative for rash.   Neurological: Positive for weakness (right knee). Negative for dizziness, numbness and headaches.   Hematological: Does not bruise/bleed easily.   Psychiatric/Behavioral: Negative for dysphoric mood, sleep disturbance and suicidal ideas. The patient is nervous/anxious.    All other systems reviewed and are negative.         Objective:      Vitals:    02/23/22 1049   BP: (!) 150/84   Pulse: 86   SpO2: 97%   Weight: 97.1 kg (214 lb)   Height: 5' 4.5" (1.638 m)     Wt Readings from Last 3 Encounters:   02/23/22 97.1 kg (214 lb)   11/17/21 100.2 kg (221 lb)   10/11/21 102.1 kg (225 lb 1.4 oz)       Physical Exam  Vitals reviewed.   Constitutional:       General: She is not in acute distress.     Appearance: Normal appearance. She is well-developed.      Comments: Obese     HENT:      Head: Normocephalic and atraumatic.   Neck:      Thyroid: No thyromegaly.   Cardiovascular:      Rate and Rhythm: Normal rate and regular rhythm.      Heart sounds: Normal heart sounds. No murmur heard.    No friction rub.   Pulmonary:      Effort: Pulmonary effort is normal.      Breath sounds: Normal breath sounds. No wheezing or rales.   Abdominal:      General: Bowel sounds are normal. There is no distension.      Palpations: Abdomen is soft.      Tenderness: There is no abdominal tenderness.   Musculoskeletal:      Cervical back: Neck supple.      Right knee: No swelling or deformity. " Normal range of motion. Tenderness present over the medial joint line.   Lymphadenopathy:      Cervical: No cervical adenopathy.   Skin:     General: Skin is warm and dry.      Findings: No rash.   Neurological:      Mental Status: She is alert and oriented to person, place, and time.   Psychiatric:         Attention and Perception: She is attentive.         Speech: Speech normal.         Behavior: Behavior normal.         Thought Content: Thought content normal.         Judgment: Judgment normal.           Assessment:       1. Type 2 diabetes mellitus without complication, without long-term current use of insulin    2. Essential hypertension    3. Mixed hyperlipidemia    4. Acute pain of right knee    5. MELODY (generalized anxiety disorder)         Plan:       Type 2 diabetes mellitus without complication, without long-term current use of insulin  Comments:  Controlled. A1c 6.5%. To continue ADA diet, regular Exercise. Will continue to monitor A1c.     Essential hypertension  Comments:  Controlled. Will continue to monitor BP on current medication regimen  Orders:  -     glimepiride (AMARYL) 2 MG tablet; Take 1 tablet (2 mg total) by mouth 2 (two) times a day.  Dispense: 180 tablet; Refill: 1  -     metFORMIN (GLUCOPHAGE-XR) 500 MG ER 24hr tablet; Take 1 tablet (500 mg total) by mouth 2 (two) times daily with meals.  Dispense: 180 tablet; Refill: 1  -     telmisartan (MICARDIS) 80 MG Tab; Take 1 tablet (80 mg total) by mouth once daily.  Dispense: 90 tablet; Refill: 1    Mixed hyperlipidemia  Comments:  Suboptimal. . Pt wishes to continue dietary discretion.    Acute pain of right knee  Comments:  Will Xray. May need PT.   Orders:  -     X-Ray Knee 3 View Right; Future; Expected date: 02/23/2022    MELODY (generalized anxiety disorder)  Comments:  Controlled. Will continue to monitor symptoms  Orders:  -     ALPRAZolam (XANAX) 0.5 MG tablet; Take 1 tablet (0.5 mg total) by mouth 2 (two) times daily.  Dispense: 60  tablet; Refill: 2    Other  Lab results discussed and reviewed with patient.    Follow up in about 3 months (around 5/23/2022) for HTN, DM, knee pain.        2/23/2022 Fatuma Hollins

## 2022-02-25 LAB
ALBUMIN SERPL-MCNC: 4.3 G/DL (ref 3.8–4.8)
ALBUMIN/GLOB SERPL: 2.4 {RATIO} (ref 1.2–2.2)
ALP SERPL-CCNC: 80 IU/L (ref 44–121)
ALT SERPL-CCNC: 17 IU/L (ref 0–32)
APPEARANCE UR: CLEAR
AST SERPL-CCNC: 18 IU/L (ref 0–40)
BACTERIA #/AREA URNS HPF: ABNORMAL /[HPF]
BACTERIA UR CULT: NO GROWTH
BACTERIA UR CULT: NORMAL
BASOPHILS # BLD AUTO: 0.1 X10E3/UL (ref 0–0.2)
BASOPHILS NFR BLD AUTO: 1 %
BILIRUB SERPL-MCNC: 0.4 MG/DL (ref 0–1.2)
BILIRUB UR QL STRIP: NEGATIVE
BUN SERPL-MCNC: 17 MG/DL (ref 8–27)
BUN/CREAT SERPL: 21 (ref 12–28)
CALCIUM SERPL-MCNC: 9.2 MG/DL (ref 8.7–10.3)
CASTS URNS QL MICRO: ABNORMAL /LPF
CHLORIDE SERPL-SCNC: 101 MMOL/L (ref 96–106)
CHOLEST SERPL-MCNC: 209 MG/DL (ref 100–199)
CO2 SERPL-SCNC: 21 MMOL/L (ref 20–29)
COLOR UR: YELLOW
CREAT SERPL-MCNC: 0.81 MG/DL (ref 0.57–1)
EOSINOPHIL # BLD AUTO: 0.3 X10E3/UL (ref 0–0.4)
EOSINOPHIL NFR BLD AUTO: 3 %
EPI CELLS #/AREA URNS HPF: >10 /HPF (ref 0–10)
ERYTHROCYTE [DISTWIDTH] IN BLOOD BY AUTOMATED COUNT: 13.5 % (ref 11.7–15.4)
GLOBULIN SER CALC-MCNC: 1.8 G/DL (ref 1.5–4.5)
GLUCOSE SERPL-MCNC: 126 MG/DL (ref 65–99)
GLUCOSE UR QL STRIP: NEGATIVE
HBA1C MFR BLD: 6.5 % (ref 4.8–5.6)
HCT VFR BLD AUTO: 47.5 % (ref 34–46.6)
HDLC SERPL-MCNC: 63 MG/DL
HGB BLD-MCNC: 15.8 G/DL (ref 11.1–15.9)
HGB UR QL STRIP: NEGATIVE
IMM GRANULOCYTES # BLD AUTO: 0 X10E3/UL (ref 0–0.1)
IMM GRANULOCYTES NFR BLD AUTO: 0 %
KETONES UR QL STRIP: NEGATIVE
LDLC SERPL CALC-MCNC: 120 MG/DL (ref 0–99)
LEUKOCYTE ESTERASE UR QL STRIP: ABNORMAL
LYMPHOCYTES # BLD AUTO: 2.3 X10E3/UL (ref 0.7–3.1)
LYMPHOCYTES NFR BLD AUTO: 26 %
MCH RBC QN AUTO: 30.1 PG (ref 26.6–33)
MCHC RBC AUTO-ENTMCNC: 33.3 G/DL (ref 31.5–35.7)
MCV RBC AUTO: 91 FL (ref 79–97)
MICRO URNS: ABNORMAL
MONOCYTES # BLD AUTO: 0.8 X10E3/UL (ref 0.1–0.9)
MONOCYTES NFR BLD AUTO: 9 %
NEUTROPHILS # BLD AUTO: 5.2 X10E3/UL (ref 1.4–7)
NEUTROPHILS NFR BLD AUTO: 61 %
NITRITE UR QL STRIP: NEGATIVE
PH UR STRIP: 6 [PH] (ref 5–7.5)
PLATELET # BLD AUTO: 230 X10E3/UL (ref 150–450)
POTASSIUM SERPL-SCNC: 4.6 MMOL/L (ref 3.5–5.2)
PROT SERPL-MCNC: 6.1 G/DL (ref 6–8.5)
PROT UR QL STRIP: NEGATIVE
RBC # BLD AUTO: 5.25 X10E6/UL (ref 3.77–5.28)
RBC #/AREA URNS HPF: ABNORMAL /HPF (ref 0–2)
SODIUM SERPL-SCNC: 139 MMOL/L (ref 134–144)
SP GR UR STRIP: 1.02 (ref 1–1.03)
TRIGL SERPL-MCNC: 149 MG/DL (ref 0–149)
TSH SERPL DL<=0.005 MIU/L-ACNC: 4.23 UIU/ML (ref 0.45–4.5)
URINALYSIS REFLEX: ABNORMAL
UROBILINOGEN UR STRIP-MCNC: 0.2 MG/DL (ref 0.2–1)
VLDLC SERPL CALC-MCNC: 26 MG/DL (ref 5–40)
WBC # BLD AUTO: 8.7 X10E3/UL (ref 3.4–10.8)
WBC #/AREA URNS HPF: ABNORMAL /HPF (ref 0–5)

## 2022-04-20 ENCOUNTER — TELEPHONE (OUTPATIENT)
Dept: FAMILY MEDICINE | Facility: CLINIC | Age: 67
End: 2022-04-20

## 2022-04-20 NOTE — TELEPHONE ENCOUNTER
----- Message from Serafin Cardona sent at 4/20/2022 11:41 AM CDT -----  Pt wants to know if she needs labs before her next appt. If she does she said send her labs to [facility].  [phone]

## 2022-04-20 NOTE — TELEPHONE ENCOUNTER
Spoke to patient. Advised no labs needed at this moment. She just had a full panel of labs 2 months ago. Will check her A1C in office when she comes in.

## 2022-06-08 ENCOUNTER — OFFICE VISIT (OUTPATIENT)
Dept: FAMILY MEDICINE | Facility: CLINIC | Age: 67
End: 2022-06-08
Payer: MEDICARE

## 2022-06-08 VITALS
HEART RATE: 84 BPM | OXYGEN SATURATION: 97 % | BODY MASS INDEX: 35.65 KG/M2 | HEIGHT: 65 IN | WEIGHT: 214 LBS | DIASTOLIC BLOOD PRESSURE: 86 MMHG | SYSTOLIC BLOOD PRESSURE: 136 MMHG

## 2022-06-08 DIAGNOSIS — R05.9 COUGH: ICD-10-CM

## 2022-06-08 DIAGNOSIS — M54.32 CHRONIC SCIATICA OF LEFT SIDE: ICD-10-CM

## 2022-06-08 DIAGNOSIS — I10 ESSENTIAL HYPERTENSION: ICD-10-CM

## 2022-06-08 DIAGNOSIS — F41.1 GAD (GENERALIZED ANXIETY DISORDER): ICD-10-CM

## 2022-06-08 DIAGNOSIS — R10.9 RIGHT FLANK PAIN: ICD-10-CM

## 2022-06-08 DIAGNOSIS — L30.9 DERMATITIS: ICD-10-CM

## 2022-06-08 DIAGNOSIS — Z12.11 COLON CANCER SCREENING: ICD-10-CM

## 2022-06-08 DIAGNOSIS — E11.9 TYPE 2 DIABETES MELLITUS WITHOUT COMPLICATION, WITHOUT LONG-TERM CURRENT USE OF INSULIN: Primary | ICD-10-CM

## 2022-06-08 LAB — HBA1C MFR BLD: 6.2 %

## 2022-06-08 PROCEDURE — 4010F PR ACE/ARB THEARPY RXD/TAKEN: ICD-10-PCS | Mod: CPTII,S$GLB,, | Performed by: FAMILY MEDICINE

## 2022-06-08 PROCEDURE — 99214 OFFICE O/P EST MOD 30 MIN: CPT | Mod: S$GLB,,, | Performed by: FAMILY MEDICINE

## 2022-06-08 PROCEDURE — 4010F ACE/ARB THERAPY RXD/TAKEN: CPT | Mod: CPTII,S$GLB,, | Performed by: FAMILY MEDICINE

## 2022-06-08 PROCEDURE — 1159F PR MEDICATION LIST DOCUMENTED IN MEDICAL RECORD: ICD-10-PCS | Mod: CPTII,S$GLB,, | Performed by: FAMILY MEDICINE

## 2022-06-08 PROCEDURE — 83036 POCT HEMOGLOBIN A1C: ICD-10-PCS | Mod: QW,,, | Performed by: FAMILY MEDICINE

## 2022-06-08 PROCEDURE — 3044F HG A1C LEVEL LT 7.0%: CPT | Mod: CPTII,S$GLB,, | Performed by: FAMILY MEDICINE

## 2022-06-08 PROCEDURE — 3008F BODY MASS INDEX DOCD: CPT | Mod: CPTII,S$GLB,, | Performed by: FAMILY MEDICINE

## 2022-06-08 PROCEDURE — 1160F PR REVIEW ALL MEDS BY PRESCRIBER/CLIN PHARMACIST DOCUMENTED: ICD-10-PCS | Mod: CPTII,S$GLB,, | Performed by: FAMILY MEDICINE

## 2022-06-08 PROCEDURE — 3044F PR MOST RECENT HEMOGLOBIN A1C LEVEL <7.0%: ICD-10-PCS | Mod: CPTII,S$GLB,, | Performed by: FAMILY MEDICINE

## 2022-06-08 PROCEDURE — 3079F PR MOST RECENT DIASTOLIC BLOOD PRESSURE 80-89 MM HG: ICD-10-PCS | Mod: CPTII,S$GLB,, | Performed by: FAMILY MEDICINE

## 2022-06-08 PROCEDURE — 99214 PR OFFICE/OUTPT VISIT, EST, LEVL IV, 30-39 MIN: ICD-10-PCS | Mod: S$GLB,,, | Performed by: FAMILY MEDICINE

## 2022-06-08 PROCEDURE — 3079F DIAST BP 80-89 MM HG: CPT | Mod: CPTII,S$GLB,, | Performed by: FAMILY MEDICINE

## 2022-06-08 PROCEDURE — 3008F PR BODY MASS INDEX (BMI) DOCUMENTED: ICD-10-PCS | Mod: CPTII,S$GLB,, | Performed by: FAMILY MEDICINE

## 2022-06-08 PROCEDURE — 1159F MED LIST DOCD IN RCRD: CPT | Mod: CPTII,S$GLB,, | Performed by: FAMILY MEDICINE

## 2022-06-08 PROCEDURE — 3075F SYST BP GE 130 - 139MM HG: CPT | Mod: CPTII,S$GLB,, | Performed by: FAMILY MEDICINE

## 2022-06-08 PROCEDURE — 3075F PR MOST RECENT SYSTOLIC BLOOD PRESS GE 130-139MM HG: ICD-10-PCS | Mod: CPTII,S$GLB,, | Performed by: FAMILY MEDICINE

## 2022-06-08 PROCEDURE — 1160F RVW MEDS BY RX/DR IN RCRD: CPT | Mod: CPTII,S$GLB,, | Performed by: FAMILY MEDICINE

## 2022-06-08 PROCEDURE — 83036 HEMOGLOBIN GLYCOSYLATED A1C: CPT | Mod: QW,,, | Performed by: FAMILY MEDICINE

## 2022-06-08 RX ORDER — CLOTRIMAZOLE AND BETAMETHASONE DIPROPIONATE 10; .64 MG/G; MG/G
CREAM TOPICAL 2 TIMES DAILY
Qty: 45 G | Refills: 1 | Status: SHIPPED | OUTPATIENT
Start: 2022-06-08 | End: 2022-09-21 | Stop reason: SDUPTHER

## 2022-06-08 RX ORDER — GLIMEPIRIDE 2 MG/1
2 TABLET ORAL 2 TIMES DAILY
Qty: 180 TABLET | Refills: 1 | Status: SHIPPED | OUTPATIENT
Start: 2022-06-08 | End: 2022-09-21 | Stop reason: SDUPTHER

## 2022-06-08 RX ORDER — METFORMIN HYDROCHLORIDE 500 MG/1
500 TABLET, EXTENDED RELEASE ORAL 2 TIMES DAILY WITH MEALS
Qty: 180 TABLET | Refills: 1 | Status: SHIPPED | OUTPATIENT
Start: 2022-06-08 | End: 2022-09-21 | Stop reason: SDUPTHER

## 2022-06-08 RX ORDER — TELMISARTAN 80 MG/1
80 TABLET ORAL DAILY
Qty: 90 TABLET | Refills: 1 | Status: SHIPPED | OUTPATIENT
Start: 2022-06-08 | End: 2022-09-21 | Stop reason: SDUPTHER

## 2022-06-08 RX ORDER — ALPRAZOLAM 0.5 MG/1
0.5 TABLET ORAL 2 TIMES DAILY
Qty: 60 TABLET | Refills: 2 | Status: SHIPPED | OUTPATIENT
Start: 2022-06-08 | End: 2022-09-21 | Stop reason: SDUPTHER

## 2022-06-08 NOTE — PROGRESS NOTES
SUBJECTIVE:    Patient ID: Della Juarez is a 66 y.o. female.    Chief Complaint: Diabetes (3mth, went over meds verbally, C-scope declined, Cologuard ordered// SW)    Pt here to checkup on acute and chronic conditions.    A1c is 6.2%.  Still walking for exercise and watching diet.  Working 2 jobs at the time.     BP is doing. Weight is stable.      Pt not taking statin due to not wanting to in preference to working on diet and exercise.      No recent labs.    Continues to have some anxiety, and takes xanax as needed. Has been sleeping ok.      Pt complains of pain for about 2 months on her right rib in the midaxillary line that radiated for a while around to the abdomen. Now it just hurts every now and then and no longer radiates anymore.     Complaining of pain in her left posterior leg pain, and has been bothering her for about 8 years. Has trouble getting out of bed in the AM. Takes tyelnol or ibuprofen, which helps better.   --------------------------------------------------------  Mammogram nl, 10/2021.                      Refuses colonoscopy.  Will prefer to take cologuard        Office Visit on 06/08/2022   Component Date Value Ref Range Status    Hemoglobin A1C 06/08/2022 6.2  % Final   Telephone on 02/10/2022   Component Date Value Ref Range Status    WBC 02/22/2022 8.7  3.4 - 10.8 x10E3/uL Final    RBC 02/22/2022 5.25  3.77 - 5.28 x10E6/uL Final    Hemoglobin 02/22/2022 15.8  11.1 - 15.9 g/dL Final    Hematocrit 02/22/2022 47.5 (A) 34.0 - 46.6 % Final    MCV 02/22/2022 91  79 - 97 fL Final    MCH 02/22/2022 30.1  26.6 - 33.0 pg Final    MCHC 02/22/2022 33.3  31.5 - 35.7 g/dL Final    RDW 02/22/2022 13.5  11.7 - 15.4 % Final    Platelets 02/22/2022 230  150 - 450 x10E3/uL Final    Neutrophils 02/22/2022 61  Not Estab. % Final    Lymphs 02/22/2022 26  Not Estab. % Final    Monocytes 02/22/2022 9  Not Estab. % Final    Eos 02/22/2022 3  Not Estab. % Final    Basos 02/22/2022 1  Not Estab. %  "[facility]    Internal Medicine Hospitalist Progress Note  01/05/2019  I evaluated patient on the above date.    Alexandr Morgan Jr., MD  [phone] (p)  Text Page        Assessment & Plan New actions/orders today (01/05/2019) are underlined.    89 year old male with HTN, CAD with stent x 2 and hyperlipidemia who presents with syncope.     Influenza A respiratory infection.  Seen by PCP 1/3 with 1 week of URI sx with reported \"low grade temp\" and cough. Per PCP notes, pt was alert and fully oriented, but became more unresponsive, thus ambulance called; blood sugar at that time 122, pulse 96, respirations 16. Course rhonchi with expiratory wheezes on exam. CMP unremarkable. UA unremarkable. BC's 1/4 NGTD. CXR negative for acute pathology. Tmax 102.3 overnight after admission. Rapid influenza 1/4 positive for Influenza and started on Tamiflu.  - Continue Tamiflu (started 1/4).  - Continue Xopenex q4h and PRN.    Syncope, suspect related to above.  Reportedly out for 10 seconds while sitting at PCP office. No prior syncope. Felt lightheaded prior to passing out. Wife reported that pt looked flushed prior to passing out.   Echocardiogram 1/4 showed EF 70-75%, no RWMA, grade I or early diastolic dysfunciton, no significant valvular disease. Overall, suspect related to viral illness above, poor oral intake, and perhaps paroxysmal afib. Pt given IVF's this stay.  - Discharge on 30d event monitor.    Question of paroxysmal atrial fibrillation on EKG, suspect related to acute viral illness above.  Initial EKG 1/3 with signs of afib with controlled ventricular response, though did appear to have some intermittent p waves. ECG 1/4 showed sinus rhythm with 1st degree AVB. [id number] of 4. Echo as above 1/4.  - Continue PTA metoprolol.  - Continue ASA.  - Discharge on 30d event monitor as above.     Trop elevation, suspect demand ischemia.  Trop up to 0.072 as above. ECG's did not show acute ischemic changes. " "Echo as above.  Recent Labs   Lab 01/04/19  0656 01/03/19  1948 01/03/19  1525   TROPI 0.072* 0.048* 0.050*   - Treat above issues.  - Continue management of known CAD as below.    Thrombocytopenia, possibly part of viral syndrome.  Recent Labs   Lab 01/05/19  1012 01/04/19  0656 01/03/19  1255   * 146* 149*   - Monitor CBC.  - Consider platelet transfusion if needs a procedure and less than 50,000; or if less than 10,000.    Hyponatremia, suspect SIADH component.  Labs 1/4: Serum osmol 278, Urine sodium 146, urine osmol 536.  Recent Labs   Lab 01/05/19  1012 01/04/19  0656 01/03/19  1255   * 132* 129*   - Place on 2L fluid restriction.  - Monitor BMP.      CAD s/p ALFRED to LAD X2 (12/2016).  Hypertension, Hyperlipidemia.  - Continue PTA ASA 81 mg po every day, atorvastatin 20 mg po every day, Imdur 30 mg po every day, Toprol XL 12.5 mg po every day.    Hypothyroidism.  - Continue PTA Synthroid 75 mcg po every day.      Anxiety.  - Continue PTA Remeron 15 mg po at bedtime, PRN Valium 5 mg po q8 hrs.     BPH.  - Continue PTA Flomax 0.4 mg po every day        Diet: Regular Diet Adult    Prophylaxis: PCD's, ambulation.   Macdonald Catheter: not present  Code Status: DNR/DNI      Disposition Plan   Expected discharge: Today, recommended to prior living arrangement.  Entered: Alexandr Morgan MD 01/05/2019, 2:30 PM         Interval History   Doing well. Feeling much better overall.    -Data reviewed today: I reviewed all new labs and imaging over the last 24 hours. I personally reviewed the EKG tracing showing irregular rhythm, no definite regular p-waves, suspicious for afib wtih CVR.    Physical Exam   Heart Rate: 72, Blood pressure 128/70, pulse 66, temperature 98.9  F (37.2  C), temperature source Oral, resp. rate 18, height 1.778 m (5' 10\"), weight 89.4 kg (197 lb 1.5 oz), SpO2 94 %.  Vitals:    01/03/19 1253 01/05/19 0650   Weight: 86.2 kg (190 lb) 89.4 kg (197 lb 1.5 oz)     Vital Signs with " Final    Neutrophils (Absolute) 02/22/2022 5.2  1.4 - 7.0 x10E3/uL Final    Lymphs (Absolute) 02/22/2022 2.3  0.7 - 3.1 x10E3/uL Final    Monocytes(Absolute) 02/22/2022 0.8  0.1 - 0.9 x10E3/uL Final    Eos (Absolute) 02/22/2022 0.3  0.0 - 0.4 x10E3/uL Final    Baso (Absolute) 02/22/2022 0.1  0.0 - 0.2 x10E3/uL Final    Immature Granulocytes 02/22/2022 0  Not Estab. % Final    Immature Grans (Abs) 02/22/2022 0.0  0.0 - 0.1 x10E3/uL Final    Glucose 02/22/2022 126 (A) 65 - 99 mg/dL Final    BUN 02/22/2022 17  8 - 27 mg/dL Final    Creatinine 02/22/2022 0.81  0.57 - 1.00 mg/dL Final    eGFR if non African American 02/22/2022 76  >59 mL/min/1.73 Final    eGFR if  02/22/2022 88  >59 mL/min/1.73 Final    BUN/Creatinine Ratio 02/22/2022 21  12 - 28 Final    Sodium 02/22/2022 139  134 - 144 mmol/L Final    Potassium 02/22/2022 4.6  3.5 - 5.2 mmol/L Final    Chloride 02/22/2022 101  96 - 106 mmol/L Final    CO2 02/22/2022 21  20 - 29 mmol/L Final    Calcium 02/22/2022 9.2  8.7 - 10.3 mg/dL Final    Protein, Total 02/22/2022 6.1  6.0 - 8.5 g/dL Final    Albumin 02/22/2022 4.3  3.8 - 4.8 g/dL Final    Globulin, Total 02/22/2022 1.8  1.5 - 4.5 g/dL Final    Albumin/Globulin Ratio 02/22/2022 2.4 (A) 1.2 - 2.2 Final    Total Bilirubin 02/22/2022 0.4  0.0 - 1.2 mg/dL Final    Alkaline Phosphatase 02/22/2022 80  44 - 121 IU/L Final    AST 02/22/2022 18  0 - 40 IU/L Final    ALT 02/22/2022 17  0 - 32 IU/L Final    Cholesterol 02/22/2022 209 (A) 100 - 199 mg/dL Final    Triglycerides 02/22/2022 149  0 - 149 mg/dL Final    HDL 02/22/2022 63  >39 mg/dL Final    VLDL Cholesterol Storm 02/22/2022 26  5 - 40 mg/dL Final    LDL Calculated 02/22/2022 120 (A) 0 - 99 mg/dL Final    Hemoglobin A1c 02/22/2022 6.5 (A) 4.8 - 5.6 % Final    TSH 02/22/2022 4.230  0.450 - 4.500 uIU/mL Final    Specific Gravity, UA 02/22/2022 1.022  1.005 - 1.030 Final    pH, UA 02/22/2022 6.0  5.0 - 7.5 Final     Color, UA 2022 Yellow  Yellow Final    Clarity, UA 2022 Clear  Clear Final    Leukocytes, UA 2022 Trace (A) Negative Final    Protein, UA 2022 Negative  Negative/Trace Final    Glucose, UA 2022 Negative  Negative Final    Ketones, UA 2022 Negative  Negative Final    Occult Blood UA 2022 Negative  Negative Final    Bilirubin, UA 2022 Negative  Negative Final    Urobilinogen, UA 2022 0.2  0.2 - 1.0 mg/dL Final    Nitrite, UA 2022 Negative  Negative Final    Microscopic Examination 2022 See below:   Final    Urinalysis Reflex 2022 Comment   Final    WBC, UA 2022 0-5  0 - 5 /hpf Final    RBC, UA 2022 0-2  0 - 2 /hpf Final    Epithelial Cells (non renal) 2022 >10 (A) 0 - 10 /hpf Final    Casts 2022 None seen  None seen /lpf Final    Bacteria, UA 2022 Few  None seen/Few Final    Urine Culture, Comprehensive 2022 Final report   Final    Result 2022 No growth   Final       Past Medical History:   Diagnosis Date    Diabetes mellitus     Hypertension     Mental disorder     Anxiety     Social History     Socioeconomic History    Marital status:    Tobacco Use    Smoking status: Former Smoker     Packs/day: 0.50     Types: Cigarettes     Start date:      Quit date: 2016     Years since quittin.4    Smokeless tobacco: Never Used   Substance and Sexual Activity    Alcohol use: No    Drug use: No    Sexual activity: Not Currently     Past Surgical History:   Procedure Laterality Date     SECTION      x3    COSMETIC SURGERY      Scar on right side of face     Family History   Problem Relation Age of Onset    Diabetes Maternal Grandmother     Hypertension Maternal Grandmother     Stroke Maternal Grandmother     Diabetes Father     Hypertension Father     Hemochromatosis Mother     Hypertension Mother     Stroke Mother     Breast cancer Paternal Aunt      Ranges  Temp:  [98.6  F (37  C)-98.9  F (37.2  C)] 98.9  F (37.2  C)  Heart Rate:  [65-79] 72  Resp:  [16-18] 18  BP: (103-138)/(50-70) 128/70  SpO2:  [92 %-94 %] 94 %  Patient Vitals for the past 24 hrs:   BP Temp Temp src Heart Rate Resp SpO2 Weight   01/05/19 0744 128/70 98.9  F (37.2  C) Oral 72 18 94 % --   01/05/19 0650 -- -- -- -- -- -- 89.4 kg (197 lb 1.5 oz)   01/04/19 2358 138/70 98.8  F (37.1  C) Oral 71 17 93 % --   01/04/19 2010 134/58 98.6  F (37  C) Oral 79 16 92 % --   01/04/19 1601 103/50 98.6  F (37  C) Oral 65 16 92 % --     I/O's Last 24 hours  I/O last 3 completed shifts:  In: 719 [I.V.:719]  Out: -     Constitutional: Alert, oriented, pleasant.  Respiratory: Diminished in bases. Few scattered expiratory rhonchi. No crackles.  Cardiovascular: RRR, no m/r/g.  GI: Soft, nt, nd, +BS.  Skin/Integumen:   Other:        Data   Recent Labs   Lab 01/05/19  1012 01/04/19  0656 01/03/19  1948 01/03/19  1525 01/03/19  1255   WBC 7.1 8.0  --   --  6.7   HGB 12.0* 13.4  --   --  13.3   MCV 87 87  --   --  88   * 146*  --   --  149*   * 132*  --   --  129*   POTASSIUM 3.7 4.1  --   --  4.2   CHLORIDE 101 101  --   --  96   CO2 22 22  --   --  27   BUN 10 12  --   --  14   CR 0.72 0.76  --   --  1.06   ANIONGAP 7 9  --   --  6   OMAR 7.6* 7.9*  --   --  8.1*   * 107*  --   --  102*   ALBUMIN  --   --   --   --  3.4   PROTTOTAL  --   --   --   --  7.2   BILITOTAL  --   --   --   --  0.6   ALKPHOS  --   --   --   --  73   ALT  --   --   --   --  28   AST  --   --   --   --  32   TROPI  --  0.072* 0.048* 0.050* 0.050*     Recent Labs   Lab Test 01/05/19  1012 01/04/19  0656 01/03/19  1255 08/21/17  0815 12/24/16  0515   * 107* 102* 100* 94         No results found for this or any previous visit (from the past 24 hour(s)).    Medications   All medications were reviewed.    sodium chloride 75 mL/hr at 01/04/19 6685       aspirin  81 mg Oral Daily     atorvastatin  20 mg Oral At Bedtime      Cancer Neg Hx     Colon cancer Neg Hx     Ovarian cancer Neg Hx     Eclampsia Neg Hx     Miscarriages / Stillbirths Neg Hx      labor Neg Hx        Review of patient's allergies indicates:   Allergen Reactions    Penicillins      Other reaction(s): Vomiting  Other reaction(s): Swelling  Other reaction(s): Nausea  Other reaction(s): Hives       Current Outpatient Medications:     ALPRAZolam (XANAX) 0.5 MG tablet, Take 1 tablet (0.5 mg total) by mouth 2 (two) times daily., Disp: 60 tablet, Rfl: 2    ascorbic acid, vitamin C, (VITAMIN C) 500 MG tablet, Take 500 mg by mouth once daily., Disp: , Rfl:     aspirin (ECOTRIN) 81 MG EC tablet, Take 1 tablet (81 mg total) by mouth once daily., Disp: , Rfl: 0    betamethasone dipropionate (DIPROLENE) 0.05 % lotion, aaa scalp QHS prn itch, Disp: 60 mL, Rfl: 2    ciclopirox (PENLAC) 8 % Soln, Apply topically nightly., Disp: 6.6 mL, Rfl: 1    clobetasoL (TEMOVATE) 0.05 % cream, aaa on dorsal foot BID until resolution, Disp: 30 g, Rfl: 1    cyanocobalamin 2000 MCG tablet, Take 2,000 mcg by mouth once daily., Disp: , Rfl:     glimepiride (AMARYL) 2 MG tablet, Take 1 tablet (2 mg total) by mouth 2 (two) times a day., Disp: 180 tablet, Rfl: 1    metFORMIN (GLUCOPHAGE-XR) 500 MG ER 24hr tablet, Take 1 tablet (500 mg total) by mouth 2 (two) times daily with meals., Disp: 180 tablet, Rfl: 1    multivitamin capsule, Take 1 capsule by mouth once daily., Disp: , Rfl:     telmisartan (MICARDIS) 80 MG Tab, Take 1 tablet (80 mg total) by mouth once daily., Disp: 90 tablet, Rfl: 1    vitamin D (VITAMIN D3) 1000 units Tab, Take 5,000 Units by mouth once daily., Disp: , Rfl:     zinc gluconate 50 mg tablet, Take 50 mg by mouth once daily., Disp: , Rfl:     clotrimazole-betamethasone 1-0.05% (LOTRISONE) cream, Apply topically 2 (two) times daily., Disp: 45 g, Rfl: 1    Review of Systems   Constitutional: Negative for appetite change, fatigue, fever and unexpected weight  isosorbide mononitrate  30 mg Oral Daily     levalbuterol  1.25 mg Nebulization Q4H While awake     levothyroxine  75 mcg Oral Daily     lisinopril  10 mg Oral Daily     metoprolol succinate ER  12.5 mg Oral Daily     mirtazapine  15 mg Oral At Bedtime     oseltamivir  75 mg Oral BID     sodium chloride (PF)  3 mL Intracatheter Q8H     tamsulosin  0.4 mg Oral Daily with supper        "change.   Respiratory: Negative for cough, chest tightness, shortness of breath and wheezing.    Cardiovascular: Negative for chest pain and leg swelling.   Gastrointestinal: Negative for abdominal pain, constipation, nausea and vomiting.        -heartburn   Genitourinary: Negative for difficulty urinating, dysuria, frequency and urgency.   Musculoskeletal: Negative for arthralgias, back pain, myalgias and neck pain.   Skin: Negative for rash.   Neurological: Positive for weakness (right knee). Negative for dizziness, numbness and headaches.   Hematological: Does not bruise/bleed easily.   Psychiatric/Behavioral: Negative for dysphoric mood, sleep disturbance and suicidal ideas. The patient is nervous/anxious.    All other systems reviewed and are negative.         Objective:      Vitals:    06/08/22 1036   BP: 136/86   Pulse: 84   SpO2: 97%   Weight: 97.1 kg (214 lb)   Height: 5' 4.5" (1.638 m)     Wt Readings from Last 3 Encounters:   06/08/22 97.1 kg (214 lb)   02/23/22 97.1 kg (214 lb)   11/17/21 100.2 kg (221 lb)       Physical Exam  Vitals reviewed.   Constitutional:       General: She is not in acute distress.     Appearance: Normal appearance. She is well-developed.      Comments: Obese     HENT:      Head: Normocephalic and atraumatic.   Neck:      Thyroid: No thyromegaly.   Cardiovascular:      Rate and Rhythm: Normal rate and regular rhythm.      Heart sounds: Normal heart sounds. No murmur heard.    No friction rub.   Pulmonary:      Effort: Pulmonary effort is normal.      Breath sounds: Normal breath sounds. No wheezing or rales.   Abdominal:      General: Bowel sounds are normal. There is no distension.      Palpations: Abdomen is soft.      Tenderness: There is no abdominal tenderness.   Musculoskeletal:      Cervical back: Neck supple.   Lymphadenopathy:      Cervical: No cervical adenopathy.   Skin:     General: Skin is warm and dry.      Findings: No rash.   Neurological:      Mental Status: She " is alert and oriented to person, place, and time.   Psychiatric:         Attention and Perception: She is attentive.         Speech: Speech normal.         Behavior: Behavior normal.         Thought Content: Thought content normal.         Judgment: Judgment normal.           Assessment:       1. Type 2 diabetes mellitus without complication, without long-term current use of insulin    2. Essential hypertension    3. MELODY (generalized anxiety disorder)    4. Colon cancer screening    5. Right flank pain    6. Dermatitis    7. Chronic sciatica of left side    8. Cough         Plan:       Type 2 diabetes mellitus without complication, without long-term current use of insulin  Comments:  Controlled. 6.2%. To continue  ADA diet, regular BS checks, and regular exercise. Will continue to monitor A1c.   Orders:  -     Hemoglobin A1C, POCT    Essential hypertension  Comments:  Controlled. Will continue to monitor BP on current medication regimen    MELODY (generalized anxiety disorder)  Comments:  Controlled. Will continue to monitor symptoms    Colon cancer screening  Comments:  Will refer to GI.  Orders:  -     Cologuard Screening (Multitarget Stool DNA); Future; Expected date: 06/08/2022    Right flank pain  Comments:  Acute. Will check rib xray  Orders:  -     X-Ray Ribs 2 View Right; Future; Expected date: 06/08/2022  -     X-Ray Chest PA And Lateral; Future; Expected date: 06/08/2022    Dermatitis  Comments:  Will treat with topical cream  Orders:  -     clotrimazole-betamethasone 1-0.05% (LOTRISONE) cream; Apply topically 2 (two) times daily.  Dispense: 45 g; Refill: 1    Chronic sciatica of left side  Comments:  Will continue to monitor function. To continue conservative Care with stretching and otc tylenol and ibuprofen.    Cough  -     X-Ray Chest PA And Lateral; Future; Expected date: 06/08/2022    Other  Lab results discussed and reviewed with patient.  Labs and/or tests have been ordered for the  evaluation/monitoring of acute/chronic conditions, to be done just before next visit.    Follow up in about 6 months (around 12/8/2022) for HTN, DM, Anxiety.        6/8/2022 Fatuma Hollins

## 2022-09-21 ENCOUNTER — OFFICE VISIT (OUTPATIENT)
Dept: FAMILY MEDICINE | Facility: CLINIC | Age: 67
End: 2022-09-21
Payer: MEDICARE

## 2022-09-21 VITALS
HEART RATE: 84 BPM | DIASTOLIC BLOOD PRESSURE: 88 MMHG | SYSTOLIC BLOOD PRESSURE: 164 MMHG | HEIGHT: 65 IN | BODY MASS INDEX: 37.15 KG/M2 | WEIGHT: 223 LBS

## 2022-09-21 DIAGNOSIS — Z12.31 ENCOUNTER FOR SCREENING MAMMOGRAM FOR MALIGNANT NEOPLASM OF BREAST: ICD-10-CM

## 2022-09-21 DIAGNOSIS — Z76.0 MEDICATION REFILL: ICD-10-CM

## 2022-09-21 DIAGNOSIS — F41.1 GAD (GENERALIZED ANXIETY DISORDER): ICD-10-CM

## 2022-09-21 DIAGNOSIS — M54.42 ACUTE LEFT-SIDED BACK PAIN WITH SCIATICA: ICD-10-CM

## 2022-09-21 DIAGNOSIS — I10 ESSENTIAL HYPERTENSION: ICD-10-CM

## 2022-09-21 DIAGNOSIS — E11.9 TYPE 2 DIABETES MELLITUS WITHOUT COMPLICATION, WITHOUT LONG-TERM CURRENT USE OF INSULIN: Primary | ICD-10-CM

## 2022-09-21 LAB — HBA1C MFR BLD: 6.3 %

## 2022-09-21 PROCEDURE — 83036 HEMOGLOBIN GLYCOSYLATED A1C: CPT | Mod: QW,,, | Performed by: FAMILY MEDICINE

## 2022-09-21 PROCEDURE — 99214 OFFICE O/P EST MOD 30 MIN: CPT | Mod: S$GLB,,, | Performed by: FAMILY MEDICINE

## 2022-09-21 PROCEDURE — 3008F BODY MASS INDEX DOCD: CPT | Mod: CPTII,S$GLB,, | Performed by: FAMILY MEDICINE

## 2022-09-21 PROCEDURE — 99214 PR OFFICE/OUTPT VISIT, EST, LEVL IV, 30-39 MIN: ICD-10-PCS | Mod: S$GLB,,, | Performed by: FAMILY MEDICINE

## 2022-09-21 PROCEDURE — 3079F PR MOST RECENT DIASTOLIC BLOOD PRESSURE 80-89 MM HG: ICD-10-PCS | Mod: CPTII,S$GLB,, | Performed by: FAMILY MEDICINE

## 2022-09-21 PROCEDURE — 3288F PR FALLS RISK ASSESSMENT DOCUMENTED: ICD-10-PCS | Mod: CPTII,S$GLB,, | Performed by: FAMILY MEDICINE

## 2022-09-21 PROCEDURE — 4010F PR ACE/ARB THEARPY RXD/TAKEN: ICD-10-PCS | Mod: CPTII,S$GLB,, | Performed by: FAMILY MEDICINE

## 2022-09-21 PROCEDURE — 1160F RVW MEDS BY RX/DR IN RCRD: CPT | Mod: CPTII,S$GLB,, | Performed by: FAMILY MEDICINE

## 2022-09-21 PROCEDURE — 1159F PR MEDICATION LIST DOCUMENTED IN MEDICAL RECORD: ICD-10-PCS | Mod: CPTII,S$GLB,, | Performed by: FAMILY MEDICINE

## 2022-09-21 PROCEDURE — 3077F SYST BP >= 140 MM HG: CPT | Mod: CPTII,S$GLB,, | Performed by: FAMILY MEDICINE

## 2022-09-21 PROCEDURE — 4010F ACE/ARB THERAPY RXD/TAKEN: CPT | Mod: CPTII,S$GLB,, | Performed by: FAMILY MEDICINE

## 2022-09-21 PROCEDURE — 3008F PR BODY MASS INDEX (BMI) DOCUMENTED: ICD-10-PCS | Mod: CPTII,S$GLB,, | Performed by: FAMILY MEDICINE

## 2022-09-21 PROCEDURE — 83036 POCT HEMOGLOBIN A1C: ICD-10-PCS | Mod: QW,,, | Performed by: FAMILY MEDICINE

## 2022-09-21 PROCEDURE — 3044F PR MOST RECENT HEMOGLOBIN A1C LEVEL <7.0%: ICD-10-PCS | Mod: CPTII,S$GLB,, | Performed by: FAMILY MEDICINE

## 2022-09-21 PROCEDURE — 1159F MED LIST DOCD IN RCRD: CPT | Mod: CPTII,S$GLB,, | Performed by: FAMILY MEDICINE

## 2022-09-21 PROCEDURE — 1160F PR REVIEW ALL MEDS BY PRESCRIBER/CLIN PHARMACIST DOCUMENTED: ICD-10-PCS | Mod: CPTII,S$GLB,, | Performed by: FAMILY MEDICINE

## 2022-09-21 PROCEDURE — 3079F DIAST BP 80-89 MM HG: CPT | Mod: CPTII,S$GLB,, | Performed by: FAMILY MEDICINE

## 2022-09-21 PROCEDURE — 3288F FALL RISK ASSESSMENT DOCD: CPT | Mod: CPTII,S$GLB,, | Performed by: FAMILY MEDICINE

## 2022-09-21 PROCEDURE — 3044F HG A1C LEVEL LT 7.0%: CPT | Mod: CPTII,S$GLB,, | Performed by: FAMILY MEDICINE

## 2022-09-21 PROCEDURE — 1101F PT FALLS ASSESS-DOCD LE1/YR: CPT | Mod: CPTII,S$GLB,, | Performed by: FAMILY MEDICINE

## 2022-09-21 PROCEDURE — 3077F PR MOST RECENT SYSTOLIC BLOOD PRESSURE >= 140 MM HG: ICD-10-PCS | Mod: CPTII,S$GLB,, | Performed by: FAMILY MEDICINE

## 2022-09-21 PROCEDURE — 1101F PR PT FALLS ASSESS DOC 0-1 FALLS W/OUT INJ PAST YR: ICD-10-PCS | Mod: CPTII,S$GLB,, | Performed by: FAMILY MEDICINE

## 2022-09-21 RX ORDER — ALPRAZOLAM 0.5 MG/1
0.5 TABLET ORAL 2 TIMES DAILY
Qty: 60 TABLET | Refills: 2 | Status: SHIPPED | OUTPATIENT
Start: 2022-09-21 | End: 2023-03-06 | Stop reason: SDUPTHER

## 2022-09-21 RX ORDER — METFORMIN HYDROCHLORIDE 500 MG/1
500 TABLET, EXTENDED RELEASE ORAL 2 TIMES DAILY WITH MEALS
Qty: 180 TABLET | Refills: 1 | Status: SHIPPED | OUTPATIENT
Start: 2022-09-21 | End: 2023-03-29 | Stop reason: SDUPTHER

## 2022-09-21 RX ORDER — GABAPENTIN 100 MG/1
100 CAPSULE ORAL 3 TIMES DAILY
Qty: 90 CAPSULE | Refills: 11 | Status: SHIPPED | OUTPATIENT
Start: 2022-09-21 | End: 2022-12-21

## 2022-09-21 RX ORDER — GLIMEPIRIDE 2 MG/1
2 TABLET ORAL 2 TIMES DAILY
Qty: 180 TABLET | Refills: 1 | Status: SHIPPED | OUTPATIENT
Start: 2022-09-21 | End: 2023-03-29 | Stop reason: SDUPTHER

## 2022-09-21 RX ORDER — CLOTRIMAZOLE AND BETAMETHASONE DIPROPIONATE 10; .64 MG/G; MG/G
CREAM TOPICAL 2 TIMES DAILY
Qty: 45 G | Refills: 1 | Status: SHIPPED | OUTPATIENT
Start: 2022-09-21

## 2022-09-21 RX ORDER — TELMISARTAN 80 MG/1
80 TABLET ORAL DAILY
Qty: 90 TABLET | Refills: 1 | Status: SHIPPED | OUTPATIENT
Start: 2022-09-21 | End: 2022-10-19

## 2022-09-21 NOTE — PROGRESS NOTES
SUBJECTIVE:    Patient ID: Della Juarez is a 66 y.o. female.    Chief Complaint: Hypertension (Did not bring bottles//needs refills//needs A1C & foot exam//bs) and Diabetes    Pt here to checkup on acute and chronic conditions.    A1c is 6.3%.  Still walking for exercise and watching diet.  Working 2 jobs at the time.     BP has been elevated lately.  Thinks it is because of her issues with work.  Weight is stable.        No recent labs.    Continues to have some anxiety, and takes xanax as needed. Has been sleeping ok.      Complaining of pain in her left posterior leg pain  states up constantly for about 10 hours a day and has no breaks to sit down sometimes.  Takes tyelnol or ibuprofen, which helps but it can still wake her up out of her sleep.   --------------------------------------------------------  Mammogram nl, 10/2021.                      Refuses colonoscopy.  Will prefer to take cologuard    Pt not taking statin due to not wanting to in preference to working on diet and exercise.          Office Visit on 2022   Component Date Value Ref Range Status    Hemoglobin A1C 2022 6.3  % Final   Office Visit on 2022   Component Date Value Ref Range Status    Hemoglobin A1C 2022 6.2  % Final       Past Medical History:   Diagnosis Date    Diabetes mellitus     Hypertension     Mental disorder     Anxiety     Social History     Socioeconomic History    Marital status:    Tobacco Use    Smoking status: Former     Packs/day: 0.50     Types: Cigarettes     Start date:      Quit date: 2016     Years since quittin.7    Smokeless tobacco: Never   Substance and Sexual Activity    Alcohol use: No    Drug use: No    Sexual activity: Not Currently     Past Surgical History:   Procedure Laterality Date     SECTION      x3    COSMETIC SURGERY      Scar on right side of face     Family History   Problem Relation Age of Onset    Diabetes Maternal Grandmother     Hypertension  Maternal Grandmother     Stroke Maternal Grandmother     Diabetes Father     Hypertension Father     Hemochromatosis Mother     Hypertension Mother     Stroke Mother     Breast cancer Paternal Aunt     Cancer Neg Hx     Colon cancer Neg Hx     Ovarian cancer Neg Hx     Eclampsia Neg Hx     Miscarriages / Stillbirths Neg Hx      labor Neg Hx        Review of patient's allergies indicates:   Allergen Reactions    Penicillins      Other reaction(s): Vomiting  Other reaction(s): Swelling  Other reaction(s): Nausea  Other reaction(s): Hives       Current Outpatient Medications:     ascorbic acid, vitamin C, (VITAMIN C) 500 MG tablet, Take 500 mg by mouth once daily., Disp: , Rfl:     aspirin (ECOTRIN) 81 MG EC tablet, Take 1 tablet (81 mg total) by mouth once daily., Disp: , Rfl: 0    clobetasoL (TEMOVATE) 0.05 % cream, aaa on dorsal foot BID until resolution, Disp: 30 g, Rfl: 1    cyanocobalamin 2000 MCG tablet, Take 2,000 mcg by mouth once daily., Disp: , Rfl:     multivitamin capsule, Take 1 capsule by mouth once daily., Disp: , Rfl:     vitamin D (VITAMIN D3) 1000 units Tab, Take 5,000 Units by mouth once daily., Disp: , Rfl:     zinc gluconate 50 mg tablet, Take 50 mg by mouth once daily., Disp: , Rfl:     ALPRAZolam (XANAX) 0.5 MG tablet, Take 1 tablet (0.5 mg total) by mouth 2 (two) times daily., Disp: 60 tablet, Rfl: 2    clotrimazole-betamethasone 1-0.05% (LOTRISONE) cream, Apply topically 2 (two) times daily., Disp: 45 g, Rfl: 1    gabapentin (NEURONTIN) 100 MG capsule, Take 1 capsule (100 mg total) by mouth 3 (three) times daily., Disp: 90 capsule, Rfl: 11    glimepiride (AMARYL) 2 MG tablet, Take 1 tablet (2 mg total) by mouth 2 (two) times a day., Disp: 180 tablet, Rfl: 1    metFORMIN (GLUCOPHAGE-XR) 500 MG ER 24hr tablet, Take 1 tablet (500 mg total) by mouth 2 (two) times daily with meals., Disp: 180 tablet, Rfl: 1    telmisartan (MICARDIS) 80 MG Tab, Take 1 tablet (80 mg total) by mouth once  "daily., Disp: 90 tablet, Rfl: 1    Review of Systems   Constitutional:  Negative for appetite change, fatigue, fever and unexpected weight change.   Respiratory:  Negative for cough, chest tightness, shortness of breath and wheezing.    Cardiovascular:  Negative for chest pain and leg swelling.   Gastrointestinal:  Negative for abdominal pain, constipation, nausea and vomiting.        -heartburn   Genitourinary:  Negative for difficulty urinating, dysuria, frequency and urgency.   Musculoskeletal:  Negative for arthralgias, back pain, myalgias and neck pain.   Skin:  Negative for rash.   Neurological:  Positive for weakness (right knee). Negative for dizziness, numbness and headaches.   Hematological:  Does not bruise/bleed easily.   Psychiatric/Behavioral:  Negative for dysphoric mood, sleep disturbance and suicidal ideas. The patient is nervous/anxious.    All other systems reviewed and are negative.       Objective:      Vitals:    09/21/22 1145 09/21/22 1210   BP: (!) 170/90 (!) 164/88   Pulse: 84    Weight: 101.2 kg (223 lb)    Height: 5' 4.5" (1.638 m)      Wt Readings from Last 3 Encounters:   09/21/22 101.2 kg (223 lb)   06/08/22 97.1 kg (214 lb)   02/23/22 97.1 kg (214 lb)       Physical Exam  Vitals reviewed.   Constitutional:       General: She is not in acute distress.     Appearance: Normal appearance. She is well-developed.      Comments: Obese     HENT:      Head: Normocephalic and atraumatic.   Neck:      Thyroid: No thyromegaly.   Cardiovascular:      Rate and Rhythm: Normal rate and regular rhythm.      Pulses:           Dorsalis pedis pulses are 2+ on the right side and 2+ on the left side.        Posterior tibial pulses are 2+ on the right side and 2+ on the left side.      Heart sounds: Normal heart sounds. No murmur heard.    No friction rub.   Pulmonary:      Effort: Pulmonary effort is normal.      Breath sounds: Normal breath sounds. No wheezing or rales.   Abdominal:      General: Bowel " sounds are normal. There is no distension.      Palpations: Abdomen is soft.      Tenderness: There is no abdominal tenderness.   Musculoskeletal:      Cervical back: Neck supple.      Right foot: Normal range of motion. No deformity.      Left foot: Normal range of motion. No deformity.        Legs:    Feet:      Right foot:      Protective Sensation: 5 sites tested.  5 sites sensed.      Skin integrity: No ulcer, blister, skin breakdown, erythema, warmth, callus or dry skin.      Left foot:      Protective Sensation: 5 sites tested.  5 sites sensed.      Skin integrity: No ulcer, blister, skin breakdown, erythema, warmth, callus or dry skin.   Lymphadenopathy:      Cervical: No cervical adenopathy.   Skin:     General: Skin is warm and dry.      Findings: No rash.   Neurological:      Mental Status: She is alert and oriented to person, place, and time.   Psychiatric:         Attention and Perception: She is attentive.         Speech: Speech normal.         Behavior: Behavior normal.         Thought Content: Thought content normal.         Judgment: Judgment normal.         Assessment:       1. Type 2 diabetes mellitus without complication, without long-term current use of insulin    2. Essential hypertension    3. MELODY (generalized anxiety disorder)    4. Acute left-sided back pain with sciatica    5. Encounter for screening mammogram for malignant neoplasm of breast    6. Medication refill         Plan:       Type 2 diabetes mellitus without complication, without long-term current use of insulin  Comments:  Controlled. A1c 6.2%. To continue ADA diet, regular BS checks, and regular exercise. Will continue to monitor A1c.  Orders:  -     POCT HEMOGLOBIN A1C  -     Foot Exam Performed    Essential hypertension  Comments:  Uncontrolled. Will continue to monitor BP on current medication regimen  Orders:  -     glimepiride (AMARYL) 2 MG tablet; Take 1 tablet (2 mg total) by mouth 2 (two) times a day.  Dispense: 180  tablet; Refill: 1  -     metFORMIN (GLUCOPHAGE-XR) 500 MG ER 24hr tablet; Take 1 tablet (500 mg total) by mouth 2 (two) times daily with meals.  Dispense: 180 tablet; Refill: 1  -     telmisartan (MICARDIS) 80 MG Tab; Take 1 tablet (80 mg total) by mouth once daily.  Dispense: 90 tablet; Refill: 1    MELODY (generalized anxiety disorder)  Comments:  Uncontrolled.  Will continue to monitor symptoms  Orders:  -     ALPRAZolam (XANAX) 0.5 MG tablet; Take 1 tablet (0.5 mg total) by mouth 2 (two) times daily.  Dispense: 60 tablet; Refill: 2    Acute left-sided back pain with sciatica  Comments:  Acute. Will try on gabapentin.  Orders:  -     gabapentin (NEURONTIN) 100 MG capsule; Take 1 capsule (100 mg total) by mouth 3 (three) times daily.  Dispense: 90 capsule; Refill: 11    Encounter for screening mammogram for malignant neoplasm of breast  Comments:  Mammogram order sent to [facility]  Orders:  -     Mammo Digital Screening Bilat; Future; Expected date: 09/21/2022    Medication refill  -     clotrimazole-betamethasone 1-0.05% (LOTRISONE) cream; Apply topically 2 (two) times daily.  Dispense: 45 g; Refill: 1    Other  Lab results discussed and reviewed with patient.  Labs and/or tests have been ordered for the evaluation/monitoring of acute/chronic conditions, to be done just before next visit.    Follow up in about 3 months (around 12/21/2022) for HTN, DM, Anxiety, Arthritis, LABS.        9/27/2022 Fatuma Hollins

## 2022-10-04 ENCOUNTER — HOSPITAL ENCOUNTER (OUTPATIENT)
Dept: RADIOLOGY | Facility: HOSPITAL | Age: 67
Discharge: HOME | End: 2022-10-04
Attending: FAMILY MEDICINE
Payer: MEDICARE

## 2022-10-04 DIAGNOSIS — M25.561 ACUTE PAIN OF RIGHT KNEE: ICD-10-CM

## 2022-10-04 DIAGNOSIS — R10.9 RIGHT FLANK PAIN: ICD-10-CM

## 2022-10-04 DIAGNOSIS — R05.9 COUGH: ICD-10-CM

## 2022-10-04 PROCEDURE — 71046 X-RAY EXAM CHEST 2 VIEWS: CPT | Mod: TC,PO

## 2022-10-04 PROCEDURE — 71100 X-RAY EXAM RIBS UNI 2 VIEWS: CPT | Mod: TC,PO,RT

## 2022-10-04 PROCEDURE — 73564 X-RAY EXAM KNEE 4 OR MORE: CPT | Mod: TC,PO,RT

## 2022-10-12 ENCOUNTER — HOSPITAL ENCOUNTER (OUTPATIENT)
Dept: RADIOLOGY | Facility: HOSPITAL | Age: 67
Discharge: HOME | End: 2022-10-12
Attending: FAMILY MEDICINE
Payer: MEDICARE

## 2022-10-12 ENCOUNTER — CLINICAL SUPPORT (OUTPATIENT)
Dept: FAMILY MEDICINE | Facility: CLINIC | Age: 67
End: 2022-10-12
Payer: MEDICARE

## 2022-10-12 VITALS — DIASTOLIC BLOOD PRESSURE: 92 MMHG | SYSTOLIC BLOOD PRESSURE: 157 MMHG

## 2022-10-12 DIAGNOSIS — Z12.31 ENCOUNTER FOR SCREENING MAMMOGRAM FOR MALIGNANT NEOPLASM OF BREAST: ICD-10-CM

## 2022-10-12 PROCEDURE — 77063 BREAST TOMOSYNTHESIS BI: CPT | Mod: TC,PO

## 2022-10-12 RX ORDER — LANCETS
EACH MISCELLANEOUS
Qty: 200 EACH | Refills: 1 | Status: SHIPPED | OUTPATIENT
Start: 2022-10-12

## 2022-10-12 RX ORDER — INSULIN PUMP SYRINGE, 3 ML
EACH MISCELLANEOUS
Qty: 1 EACH | Refills: 0 | Status: SHIPPED | OUTPATIENT
Start: 2022-10-12 | End: 2023-10-12

## 2022-10-12 RX ORDER — VALSARTAN AND HYDROCHLOROTHIAZIDE 320; 12.5 MG/1; MG/1
1 TABLET, FILM COATED ORAL DAILY
Qty: 30 TABLET | Refills: 2 | Status: CANCELLED | OUTPATIENT
Start: 2022-10-12

## 2022-10-12 NOTE — TELEPHONE ENCOUNTER
The patient's prescription has been approved and sent to   [facility]/pharmacy #[id number] - [place] Soren[place] - [address]  [address] [address]  Phone: [phone] Fax: [phone]

## 2022-10-17 NOTE — TELEPHONE ENCOUNTER
----- Message from Frida Mcneil sent at 10/17/2022  2:36 PM CDT -----  Pt needs a phone call from the office concerning her blood pressure and meds. Pt #[phone]

## 2022-10-19 RX ORDER — LOSARTAN POTASSIUM 100 MG/1
100 TABLET ORAL DAILY
Qty: 30 TABLET | Refills: 5 | Status: SHIPPED | OUTPATIENT
Start: 2022-10-19 | End: 2023-03-29 | Stop reason: SDUPTHER

## 2022-10-19 NOTE — TELEPHONE ENCOUNTER
Spoke to patient. She increased the Telmisartan 80mg from QD to BID since Thursday of last week. But she has been checking her BP and it's still in the 170's - 180's and had a headache yesterday She did get new eye glasses recently as far as headache so not sure if that is playing a part along with the high BP. Please advise.     Printed.

## 2022-10-26 ENCOUNTER — CLINICAL SUPPORT (OUTPATIENT)
Dept: FAMILY MEDICINE | Facility: CLINIC | Age: 67
End: 2022-10-26
Payer: MEDICARE

## 2022-10-26 VITALS — SYSTOLIC BLOOD PRESSURE: 179 MMHG | DIASTOLIC BLOOD PRESSURE: 93 MMHG

## 2022-10-26 DIAGNOSIS — I10 ESSENTIAL HYPERTENSION: Primary | ICD-10-CM

## 2022-10-26 NOTE — PROGRESS NOTES
Patient here for ongoing HTN.    Per Dr Hollins, Stop Losartan and start Amlodipine 10mg QD.    Patient advised if she ever develops HTN, chest pain, headache, vision changes then go to ER.

## 2022-10-27 ENCOUNTER — TELEPHONE (OUTPATIENT)
Dept: FAMILY MEDICINE | Facility: CLINIC | Age: 67
End: 2022-10-27

## 2022-10-27 RX ORDER — AMLODIPINE BESYLATE 10 MG/1
10 TABLET ORAL DAILY
Qty: 30 TABLET | Refills: 2 | Status: SHIPPED | OUTPATIENT
Start: 2022-10-27 | End: 2022-12-21

## 2022-10-27 NOTE — TELEPHONE ENCOUNTER
----- Message from Marietta Hernandez sent at 10/27/2022  7:57 AM CDT -----  VM 10/26/2022 @ 5:32 AM : patient called and want to speak to Alida she stated that she was advised that her prescription was going to be called in so she can start it today but the pharmacy does not have anything for her please give her a call at [phone]

## 2022-12-21 ENCOUNTER — TELEPHONE (OUTPATIENT)
Dept: FAMILY MEDICINE | Facility: CLINIC | Age: 67
End: 2022-12-21

## 2022-12-21 ENCOUNTER — OFFICE VISIT (OUTPATIENT)
Dept: FAMILY MEDICINE | Facility: CLINIC | Age: 67
End: 2022-12-21
Payer: MEDICARE

## 2022-12-21 VITALS
HEART RATE: 82 BPM | DIASTOLIC BLOOD PRESSURE: 86 MMHG | WEIGHT: 233.38 LBS | SYSTOLIC BLOOD PRESSURE: 148 MMHG | OXYGEN SATURATION: 99 % | BODY MASS INDEX: 38.88 KG/M2 | HEIGHT: 65 IN

## 2022-12-21 DIAGNOSIS — Z13.29 SCREENING FOR ENDOCRINE DISORDER: ICD-10-CM

## 2022-12-21 DIAGNOSIS — Z13.89 SCREENING FOR BLOOD OR PROTEIN IN URINE: ICD-10-CM

## 2022-12-21 DIAGNOSIS — F41.1 GAD (GENERALIZED ANXIETY DISORDER): ICD-10-CM

## 2022-12-21 DIAGNOSIS — Z13.6 SCREENING FOR ISCHEMIC HEART DISEASE (IHD): ICD-10-CM

## 2022-12-21 DIAGNOSIS — E11.9 TYPE 2 DIABETES MELLITUS WITHOUT COMPLICATION, WITHOUT LONG-TERM CURRENT USE OF INSULIN: Primary | ICD-10-CM

## 2022-12-21 DIAGNOSIS — I10 ESSENTIAL HYPERTENSION: ICD-10-CM

## 2022-12-21 DIAGNOSIS — Z79.899 LONG-TERM USE OF HIGH-RISK MEDICATION: ICD-10-CM

## 2022-12-21 LAB — HBA1C MFR BLD: 6.3 %

## 2022-12-21 PROCEDURE — 1101F PR PT FALLS ASSESS DOC 0-1 FALLS W/OUT INJ PAST YR: ICD-10-PCS | Mod: CPTII,S$GLB,, | Performed by: FAMILY MEDICINE

## 2022-12-21 PROCEDURE — 3077F SYST BP >= 140 MM HG: CPT | Mod: CPTII,S$GLB,, | Performed by: FAMILY MEDICINE

## 2022-12-21 PROCEDURE — 83036 POCT HEMOGLOBIN A1C: ICD-10-PCS | Mod: QW,,, | Performed by: FAMILY MEDICINE

## 2022-12-21 PROCEDURE — 1159F PR MEDICATION LIST DOCUMENTED IN MEDICAL RECORD: ICD-10-PCS | Mod: CPTII,S$GLB,, | Performed by: FAMILY MEDICINE

## 2022-12-21 PROCEDURE — 4010F PR ACE/ARB THEARPY RXD/TAKEN: ICD-10-PCS | Mod: CPTII,S$GLB,, | Performed by: FAMILY MEDICINE

## 2022-12-21 PROCEDURE — 3044F HG A1C LEVEL LT 7.0%: CPT | Mod: CPTII,S$GLB,, | Performed by: FAMILY MEDICINE

## 2022-12-21 PROCEDURE — 3079F DIAST BP 80-89 MM HG: CPT | Mod: CPTII,S$GLB,, | Performed by: FAMILY MEDICINE

## 2022-12-21 PROCEDURE — 3288F FALL RISK ASSESSMENT DOCD: CPT | Mod: CPTII,S$GLB,, | Performed by: FAMILY MEDICINE

## 2022-12-21 PROCEDURE — 99214 OFFICE O/P EST MOD 30 MIN: CPT | Mod: S$GLB,,, | Performed by: FAMILY MEDICINE

## 2022-12-21 PROCEDURE — 83036 HEMOGLOBIN GLYCOSYLATED A1C: CPT | Mod: QW,,, | Performed by: FAMILY MEDICINE

## 2022-12-21 PROCEDURE — 99214 PR OFFICE/OUTPT VISIT, EST, LEVL IV, 30-39 MIN: ICD-10-PCS | Mod: S$GLB,,, | Performed by: FAMILY MEDICINE

## 2022-12-21 PROCEDURE — 1159F MED LIST DOCD IN RCRD: CPT | Mod: CPTII,S$GLB,, | Performed by: FAMILY MEDICINE

## 2022-12-21 PROCEDURE — 3288F PR FALLS RISK ASSESSMENT DOCUMENTED: ICD-10-PCS | Mod: CPTII,S$GLB,, | Performed by: FAMILY MEDICINE

## 2022-12-21 PROCEDURE — 3044F PR MOST RECENT HEMOGLOBIN A1C LEVEL <7.0%: ICD-10-PCS | Mod: CPTII,S$GLB,, | Performed by: FAMILY MEDICINE

## 2022-12-21 PROCEDURE — 3008F PR BODY MASS INDEX (BMI) DOCUMENTED: ICD-10-PCS | Mod: CPTII,S$GLB,, | Performed by: FAMILY MEDICINE

## 2022-12-21 PROCEDURE — 1160F RVW MEDS BY RX/DR IN RCRD: CPT | Mod: CPTII,S$GLB,, | Performed by: FAMILY MEDICINE

## 2022-12-21 PROCEDURE — 1101F PT FALLS ASSESS-DOCD LE1/YR: CPT | Mod: CPTII,S$GLB,, | Performed by: FAMILY MEDICINE

## 2022-12-21 PROCEDURE — 3077F PR MOST RECENT SYSTOLIC BLOOD PRESSURE >= 140 MM HG: ICD-10-PCS | Mod: CPTII,S$GLB,, | Performed by: FAMILY MEDICINE

## 2022-12-21 PROCEDURE — 3079F PR MOST RECENT DIASTOLIC BLOOD PRESSURE 80-89 MM HG: ICD-10-PCS | Mod: CPTII,S$GLB,, | Performed by: FAMILY MEDICINE

## 2022-12-21 PROCEDURE — 4010F ACE/ARB THERAPY RXD/TAKEN: CPT | Mod: CPTII,S$GLB,, | Performed by: FAMILY MEDICINE

## 2022-12-21 PROCEDURE — 1160F PR REVIEW ALL MEDS BY PRESCRIBER/CLIN PHARMACIST DOCUMENTED: ICD-10-PCS | Mod: CPTII,S$GLB,, | Performed by: FAMILY MEDICINE

## 2022-12-21 PROCEDURE — 3008F BODY MASS INDEX DOCD: CPT | Mod: CPTII,S$GLB,, | Performed by: FAMILY MEDICINE

## 2022-12-21 RX ORDER — AMLODIPINE BESYLATE 10 MG/1
TABLET ORAL
Qty: 30 TABLET | Refills: 2
Start: 2022-12-21 | End: 2023-01-04

## 2022-12-21 NOTE — PROGRESS NOTES
SUBJECTIVE:    Patient ID: Della Juarez is a 67 y.o. female.    Chief Complaint: Follow-up (Decline flu, colon, lab wanted), Hypertension, and Diabetes      Pt here to checkup on acute and chronic conditions.    A1c is 6.3%.  Still walking for exercise and watching diet.  Working 2 jobs at the time.     BP is elevated. No longer having headaches. Did not start norvasc. Decided to give losartan a chance.  And also decided to get stress out of her life.   Has been ignoring work stress as much as possible.  Going to try to retire this year.  Has gained 10 pounds since last visit.       No recent labs.    Continues to have some anxiety, and takes xanax as needed. Has been sleeping ok.      Knees hurt all the time, and she has to stand up all day in both of her jobs. Takes tyelnol or ibuprofen.   --------------------------------------------------------  Mammogram nl, 10/2022.                      Refuses colonoscopy.  Will prefer to take cologuard    Pt not taking statin due to not wanting to in preference to working on diet and exercise.          Office Visit on 2022   Component Date Value Ref Range Status    Hemoglobin A1C 2022 6.3  % Final   Office Visit on 2022   Component Date Value Ref Range Status    Hemoglobin A1C 2022 6.3  % Final       Past Medical History:   Diagnosis Date    Diabetes mellitus     Hypertension     Mental disorder     Anxiety     Social History     Socioeconomic History    Marital status:    Tobacco Use    Smoking status: Former     Packs/day: 0.50     Types: Cigarettes     Start date:      Quit date: 2016     Years since quittin.9    Smokeless tobacco: Never   Substance and Sexual Activity    Alcohol use: No    Drug use: No    Sexual activity: Not Currently     Past Surgical History:   Procedure Laterality Date     SECTION      x3    COSMETIC SURGERY      Scar on right side of face     Family History   Problem Relation Age of Onset    Diabetes  Maternal Grandmother     Hypertension Maternal Grandmother     Stroke Maternal Grandmother     Diabetes Father     Hypertension Father     Hemochromatosis Mother     Hypertension Mother     Stroke Mother     Breast cancer Paternal Aunt     Cancer Neg Hx     Colon cancer Neg Hx     Ovarian cancer Neg Hx     Eclampsia Neg Hx     Miscarriages / Stillbirths Neg Hx      labor Neg Hx        Review of patient's allergies indicates:   Allergen Reactions    Penicillins      Other reaction(s): Vomiting  Other reaction(s): Swelling  Other reaction(s): Nausea  Other reaction(s): Hives       Current Outpatient Medications:     ALPRAZolam (XANAX) 0.5 MG tablet, Take 1 tablet (0.5 mg total) by mouth 2 (two) times daily., Disp: 60 tablet, Rfl: 2    ascorbic acid, vitamin C, (VITAMIN C) 500 MG tablet, Take 500 mg by mouth once daily., Disp: , Rfl:     aspirin (ECOTRIN) 81 MG EC tablet, Take 1 tablet (81 mg total) by mouth once daily., Disp: , Rfl: 0    blood sugar diagnostic Strp, To check BG two times daily, to use with insurance preferred meter, Disp: 200 each, Rfl: 1    blood-glucose meter kit, To check BG two times daily, to use with insurance preferred meter, Disp: 1 each, Rfl: 0    clobetasoL (TEMOVATE) 0.05 % cream, aaa on dorsal foot BID until resolution, Disp: 30 g, Rfl: 1    clotrimazole-betamethasone 1-0.05% (LOTRISONE) cream, Apply topically 2 (two) times daily., Disp: 45 g, Rfl: 1    cyanocobalamin 2000 MCG tablet, Take 2,000 mcg by mouth once daily., Disp: , Rfl:     glimepiride (AMARYL) 2 MG tablet, Take 1 tablet (2 mg total) by mouth 2 (two) times a day., Disp: 180 tablet, Rfl: 1    lancets Misc, To check BG two times daily, to use with insurance preferred meter, Disp: 200 each, Rfl: 1    losartan (COZAAR) 100 MG tablet, Take 1 tablet (100 mg total) by mouth once daily., Disp: 30 tablet, Rfl: 5    metFORMIN (GLUCOPHAGE-XR) 500 MG ER 24hr tablet, Take 1 tablet (500 mg total) by mouth 2 (two) times daily with  "meals., Disp: 180 tablet, Rfl: 1    multivitamin capsule, Take 1 capsule by mouth once daily., Disp: , Rfl:     vitamin D (VITAMIN D3) 1000 units Tab, Take 5,000 Units by mouth once daily., Disp: , Rfl:     zinc gluconate 50 mg tablet, Take 50 mg by mouth once daily., Disp: , Rfl:     amLODIPine (NORVASC) 10 MG tablet, Take 2.5mg po daily, Disp: 30 tablet, Rfl: 2    Review of Systems   Constitutional:  Negative for appetite change, fatigue, fever and unexpected weight change.   Respiratory:  Negative for cough, chest tightness, shortness of breath and wheezing.    Cardiovascular:  Negative for chest pain and leg swelling.   Gastrointestinal:  Negative for abdominal pain, constipation, nausea and vomiting.        -heartburn   Genitourinary:  Negative for difficulty urinating, dysuria, frequency and urgency.   Musculoskeletal:  Negative for arthralgias, back pain, myalgias and neck pain.   Skin:  Negative for rash.   Neurological:  Positive for weakness (right knee). Negative for dizziness, numbness and headaches.   Hematological:  Does not bruise/bleed easily.   Psychiatric/Behavioral:  Negative for dysphoric mood, sleep disturbance and suicidal ideas. The patient is nervous/anxious.    All other systems reviewed and are negative.       Objective:      Vitals:    12/21/22 1222 12/21/22 1238   BP: (!) 144/84 (!) 148/86   Pulse: 82    SpO2: 99%    Weight: 105.9 kg (233 lb 6.4 oz)    Height: 5' 4.5" (1.638 m)      Wt Readings from Last 3 Encounters:   12/21/22 105.9 kg (233 lb 6.4 oz)   09/21/22 101.2 kg (223 lb)   06/08/22 97.1 kg (214 lb)       Physical Exam  Vitals reviewed.   Constitutional:       General: She is not in acute distress.     Appearance: Normal appearance. She is well-developed.      Comments: Obese     HENT:      Head: Normocephalic and atraumatic.   Neck:      Thyroid: No thyromegaly.   Cardiovascular:      Rate and Rhythm: Normal rate and regular rhythm.      Heart sounds: Normal heart sounds. No " murmur heard.    No friction rub.   Pulmonary:      Effort: Pulmonary effort is normal.      Breath sounds: Normal breath sounds. No wheezing or rales.   Abdominal:      General: Bowel sounds are normal. There is no distension.      Palpations: Abdomen is soft.      Tenderness: There is no abdominal tenderness.   Musculoskeletal:      Cervical back: Neck supple.   Lymphadenopathy:      Cervical: No cervical adenopathy.   Skin:     General: Skin is warm and dry.      Findings: No rash.   Neurological:      Mental Status: She is alert and oriented to person, place, and time.   Psychiatric:         Attention and Perception: She is attentive.         Speech: Speech normal.         Behavior: Behavior normal.         Thought Content: Thought content normal.         Judgment: Judgment normal.         Assessment:       1. Type 2 diabetes mellitus without complication, without long-term current use of insulin    2. Essential hypertension    3. MELODY (generalized anxiety disorder)    4. Long-term use of high-risk medication    5. Screening for blood or protein in urine    6. Screening for ischemic heart disease (IHD)    7. Screening for endocrine disorder         Plan:       Type 2 diabetes mellitus without complication, without long-term current use of insulin  Comments:  Controlled. A1c 6.3%. To continue ADA diet, regular BS checks, and regular exercise. Will continue to monitor A1c.  Orders:  -     Hemoglobin A1C, POCT  -     Comprehensive Metabolic Panel; Future; Expected date: 12/21/2022  -     Lipid Panel; Future; Expected date: 12/21/2022  -     Urinalysis, Reflex to Urine Culture Urine, Clean Catch; Future; Expected date: 12/21/2022  -     TSH w/reflex to FT4; Future; Expected date: 12/21/2022  -     CBC Auto Differential; Future; Expected date: 12/21/2022  -     Hemoglobin A1C; Future; Expected date: 12/21/2022  -     Microalbumin/Creatinine Ratio, Urine; Future; Expected date: 12/21/2022    Essential  hypertension  Comments:  Uncontrolled. Will continue to adjust BP med. Pt to start take amlodipine 2.5mg daily  Orders:  -     amLODIPine (NORVASC) 10 MG tablet; Take 2.5mg po daily  Dispense: 30 tablet; Refill: 2    MELODY (generalized anxiety disorder)  Comments:  Improved. Will continue to monitor symptoms    Long-term use of high-risk medication  -     Comprehensive Metabolic Panel; Future; Expected date: 12/21/2022  -     Lipid Panel; Future; Expected date: 12/21/2022  -     Urinalysis, Reflex to Urine Culture Urine, Clean Catch; Future; Expected date: 12/21/2022  -     TSH w/reflex to FT4; Future; Expected date: 12/21/2022  -     CBC Auto Differential; Future; Expected date: 12/21/2022  -     Hemoglobin A1C; Future; Expected date: 12/21/2022  -     Microalbumin/Creatinine Ratio, Urine; Future; Expected date: 12/21/2022    Screening for blood or protein in urine  -     Urinalysis, Reflex to Urine Culture Urine, Clean Catch; Future; Expected date: 12/21/2022  -     Microalbumin/Creatinine Ratio, Urine; Future; Expected date: 12/21/2022    Screening for ischemic heart disease (IHD)  -     Comprehensive Metabolic Panel; Future; Expected date: 12/21/2022  -     Lipid Panel; Future; Expected date: 12/21/2022    Screening for endocrine disorder  -     TSH w/reflex to FT4; Future; Expected date: 12/21/2022  -     Hemoglobin A1C; Future; Expected date: 12/21/2022      Other  Lab results discussed and reviewed with patient.  Labs and/or tests have been ordered for the evaluation/monitoring of acute/chronic conditions, to be done just before next visit.    Follow up in about 3 months (around 3/21/2023) for HTN, DM, Anxiety, LABS.        12/21/2022 Fatuma Hollins

## 2022-12-21 NOTE — TELEPHONE ENCOUNTER
----- Message from Marietta Hernandez sent at 12/21/2022  3:16 PM CST -----  Patient called and stated that she also need to have her losartan called in as well if any questions please give her a call at [phone]

## 2023-01-04 ENCOUNTER — TELEPHONE (OUTPATIENT)
Dept: FAMILY MEDICINE | Facility: CLINIC | Age: 68
End: 2023-01-04

## 2023-01-04 ENCOUNTER — CLINICAL SUPPORT (OUTPATIENT)
Dept: FAMILY MEDICINE | Facility: CLINIC | Age: 68
End: 2023-01-04
Payer: MEDICARE

## 2023-01-04 VITALS — SYSTOLIC BLOOD PRESSURE: 152 MMHG | DIASTOLIC BLOOD PRESSURE: 94 MMHG

## 2023-01-04 RX ORDER — AMLODIPINE BESYLATE 2.5 MG/1
2.5 TABLET ORAL DAILY
Qty: 30 TABLET | Refills: 2 | Status: CANCELLED | OUTPATIENT
Start: 2023-01-04

## 2023-01-04 RX ORDER — AMLODIPINE BESYLATE 2.5 MG/1
2.5 TABLET ORAL DAILY
Qty: 30 TABLET | Refills: 2 | Status: SHIPPED | OUTPATIENT
Start: 2023-01-04 | End: 2023-03-29 | Stop reason: SDUPTHER

## 2023-01-04 NOTE — TELEPHONE ENCOUNTER
----- Message from Serafin Cardona sent at 1/4/2023  2:55 PM CST -----  Pt is here said she thinks her bp is high and wants to be seen cause she's not feeling well.  [phone]

## 2023-01-04 NOTE — PROGRESS NOTES
Patient here for BP check. She has been taking TheraFlu and thinks it is raising it. Negative Covid test. Told her to take OTC Coricidin HBP

## 2023-01-11 ENCOUNTER — OFFICE VISIT (OUTPATIENT)
Dept: FAMILY MEDICINE | Facility: CLINIC | Age: 68
End: 2023-01-11
Payer: MEDICARE

## 2023-01-11 VITALS
BODY MASS INDEX: 37.35 KG/M2 | OXYGEN SATURATION: 99 % | HEIGHT: 65 IN | DIASTOLIC BLOOD PRESSURE: 88 MMHG | SYSTOLIC BLOOD PRESSURE: 134 MMHG | HEART RATE: 60 BPM | WEIGHT: 224.19 LBS

## 2023-01-11 DIAGNOSIS — B34.9 VIRAL SYNDROME: Primary | ICD-10-CM

## 2023-01-11 DIAGNOSIS — I10 ESSENTIAL HYPERTENSION: ICD-10-CM

## 2023-01-11 PROCEDURE — 3008F BODY MASS INDEX DOCD: CPT | Mod: CPTII,S$GLB,, | Performed by: FAMILY MEDICINE

## 2023-01-11 PROCEDURE — 3075F SYST BP GE 130 - 139MM HG: CPT | Mod: CPTII,S$GLB,, | Performed by: FAMILY MEDICINE

## 2023-01-11 PROCEDURE — 1160F RVW MEDS BY RX/DR IN RCRD: CPT | Mod: CPTII,S$GLB,, | Performed by: FAMILY MEDICINE

## 2023-01-11 PROCEDURE — 3288F PR FALLS RISK ASSESSMENT DOCUMENTED: ICD-10-PCS | Mod: CPTII,S$GLB,, | Performed by: FAMILY MEDICINE

## 2023-01-11 PROCEDURE — 3288F FALL RISK ASSESSMENT DOCD: CPT | Mod: CPTII,S$GLB,, | Performed by: FAMILY MEDICINE

## 2023-01-11 PROCEDURE — 1159F PR MEDICATION LIST DOCUMENTED IN MEDICAL RECORD: ICD-10-PCS | Mod: CPTII,S$GLB,, | Performed by: FAMILY MEDICINE

## 2023-01-11 PROCEDURE — 1101F PR PT FALLS ASSESS DOC 0-1 FALLS W/OUT INJ PAST YR: ICD-10-PCS | Mod: CPTII,S$GLB,, | Performed by: FAMILY MEDICINE

## 2023-01-11 PROCEDURE — 4010F PR ACE/ARB THEARPY RXD/TAKEN: ICD-10-PCS | Mod: CPTII,S$GLB,, | Performed by: FAMILY MEDICINE

## 2023-01-11 PROCEDURE — 99213 OFFICE O/P EST LOW 20 MIN: CPT | Mod: S$GLB,,, | Performed by: FAMILY MEDICINE

## 2023-01-11 PROCEDURE — 3008F PR BODY MASS INDEX (BMI) DOCUMENTED: ICD-10-PCS | Mod: CPTII,S$GLB,, | Performed by: FAMILY MEDICINE

## 2023-01-11 PROCEDURE — 3079F PR MOST RECENT DIASTOLIC BLOOD PRESSURE 80-89 MM HG: ICD-10-PCS | Mod: CPTII,S$GLB,, | Performed by: FAMILY MEDICINE

## 2023-01-11 PROCEDURE — 1160F PR REVIEW ALL MEDS BY PRESCRIBER/CLIN PHARMACIST DOCUMENTED: ICD-10-PCS | Mod: CPTII,S$GLB,, | Performed by: FAMILY MEDICINE

## 2023-01-11 PROCEDURE — 3075F PR MOST RECENT SYSTOLIC BLOOD PRESS GE 130-139MM HG: ICD-10-PCS | Mod: CPTII,S$GLB,, | Performed by: FAMILY MEDICINE

## 2023-01-11 PROCEDURE — 4010F ACE/ARB THERAPY RXD/TAKEN: CPT | Mod: CPTII,S$GLB,, | Performed by: FAMILY MEDICINE

## 2023-01-11 PROCEDURE — 99213 PR OFFICE/OUTPT VISIT, EST, LEVL III, 20-29 MIN: ICD-10-PCS | Mod: S$GLB,,, | Performed by: FAMILY MEDICINE

## 2023-01-11 PROCEDURE — 3079F DIAST BP 80-89 MM HG: CPT | Mod: CPTII,S$GLB,, | Performed by: FAMILY MEDICINE

## 2023-01-11 PROCEDURE — 1159F MED LIST DOCD IN RCRD: CPT | Mod: CPTII,S$GLB,, | Performed by: FAMILY MEDICINE

## 2023-01-11 PROCEDURE — 1101F PT FALLS ASSESS-DOCD LE1/YR: CPT | Mod: CPTII,S$GLB,, | Performed by: FAMILY MEDICINE

## 2023-01-11 NOTE — LETTER
January 11, 2023      [facility]  [address]  [place] [address]  Phone: [phone]  Fax: [phone]       Patient: Della Juarez   YOB: 1955  Date of Visit: 01/11/2023    To Whom It May Concern:    Anna Juarez  was at [facility] on 01/04/2023 and 01/11/2023. Please excuse the patient from work 12/30/2022 to 01- with restrictions.  Until reevaluation, she should not walk more than 150 yards at a time and needs the accomodation of a stool for resting at a desk.    Thank you for your time and consideration in this matter.    Sincerely,      Fatuma Hollins MD

## 2023-01-11 NOTE — PROGRESS NOTES
SUBJECTIVE:    Patient ID: Della Juarez is a 67 y.o. female.    Chief Complaint: Follow-up (Meds verbally confirmed/pna&flu denied/ sob has gotten bad/)      Pt here to f/u on viral like symptoms that started last week.    Patient here for BP check. She has been taking TheraFlu and thinks it is raising it. Negative Covid test. Told her to take OTC Coricidin HBP. States she was having fever, N/V.    Still has some BRUNSON, and sometimes even when she is still.  99% pulse ox at rest on RA. Pt doesn't have a pulse oximeter at home. Hurts on both sides of her abdomen.  Was coughing a lot and hurt in the center of her chest. Was coughing up clear up clear phlegm, which has resolved. Still coughing up phlegm. Ears are still getting stopped up as well. Blowing her nose more as well.     Still feels really tired.     Has not been to work. Was sent home on 22    --------------------------------------------------------  Mammogram nl, 10/2022.                      Refuses colonoscopy.  Will prefer to take cologuard        Office Visit on 2022   Component Date Value Ref Range Status    Hemoglobin A1C, POC 2022 6.3  % Final   Office Visit on 2022   Component Date Value Ref Range Status    Hemoglobin A1C, POC 2022 6.3  % Final       Past Medical History:   Diagnosis Date    Diabetes mellitus     Hypertension     Mental disorder     Anxiety     Social History     Socioeconomic History    Marital status:    Tobacco Use    Smoking status: Former     Packs/day: 0.50     Types: Cigarettes     Start date:      Quit date: 2016     Years since quittin.0    Smokeless tobacco: Never   Substance and Sexual Activity    Alcohol use: No    Drug use: No    Sexual activity: Not Currently     Past Surgical History:   Procedure Laterality Date     SECTION      x3    COSMETIC SURGERY      Scar on right side of face     Family History   Problem Relation Age of Onset    Diabetes Maternal Grandmother      Hypertension Maternal Grandmother     Stroke Maternal Grandmother     Diabetes Father     Hypertension Father     Hemochromatosis Mother     Hypertension Mother     Stroke Mother     Breast cancer Paternal Aunt     Cancer Neg Hx     Colon cancer Neg Hx     Ovarian cancer Neg Hx     Eclampsia Neg Hx     Miscarriages / Stillbirths Neg Hx      labor Neg Hx        Review of patient's allergies indicates:   Allergen Reactions    Penicillins      Other reaction(s): Vomiting  Other reaction(s): Swelling  Other reaction(s): Nausea  Other reaction(s): Hives       Current Outpatient Medications:     ALPRAZolam (XANAX) 0.5 MG tablet, Take 1 tablet (0.5 mg total) by mouth 2 (two) times daily., Disp: 60 tablet, Rfl: 2    amLODIPine (NORVASC) 2.5 MG tablet, Take 1 tablet (2.5 mg total) by mouth once daily., Disp: 30 tablet, Rfl: 2    ascorbic acid, vitamin C, (VITAMIN C) 500 MG tablet, Take 500 mg by mouth once daily., Disp: , Rfl:     aspirin (ECOTRIN) 81 MG EC tablet, Take 1 tablet (81 mg total) by mouth once daily., Disp: , Rfl: 0    blood sugar diagnostic Strp, To check BG two times daily, to use with insurance preferred meter, Disp: 200 each, Rfl: 1    blood-glucose meter kit, To check BG two times daily, to use with insurance preferred meter, Disp: 1 each, Rfl: 0    clobetasoL (TEMOVATE) 0.05 % cream, aaa on dorsal foot BID until resolution, Disp: 30 g, Rfl: 1    clotrimazole-betamethasone 1-0.05% (LOTRISONE) cream, Apply topically 2 (two) times daily., Disp: 45 g, Rfl: 1    cyanocobalamin 2000 MCG tablet, Take 2,000 mcg by mouth once daily., Disp: , Rfl:     glimepiride (AMARYL) 2 MG tablet, Take 1 tablet (2 mg total) by mouth 2 (two) times a day., Disp: 180 tablet, Rfl: 1    lancets Misc, To check BG two times daily, to use with insurance preferred meter, Disp: 200 each, Rfl: 1    losartan (COZAAR) 100 MG tablet, Take 1 tablet (100 mg total) by mouth once daily., Disp: 30 tablet, Rfl: 5    metFORMIN  "(GLUCOPHAGE-XR) 500 MG ER 24hr tablet, Take 1 tablet (500 mg total) by mouth 2 (two) times daily with meals., Disp: 180 tablet, Rfl: 1    multivitamin capsule, Take 1 capsule by mouth once daily., Disp: , Rfl:     vitamin D (VITAMIN D3) 1000 units Tab, Take 5,000 Units by mouth once daily., Disp: , Rfl:     zinc gluconate 50 mg tablet, Take 50 mg by mouth once daily., Disp: , Rfl:     Review of Systems   Constitutional:  Positive for fatigue. Negative for appetite change, fever and unexpected weight change.   Respiratory:  Positive for cough and shortness of breath. Negative for chest tightness and wheezing.    Cardiovascular:  Negative for chest pain and leg swelling.   Gastrointestinal:  Negative for abdominal pain, constipation, nausea and vomiting.        -heartburn   Genitourinary:  Negative for difficulty urinating, dysuria, frequency and urgency.   Musculoskeletal:  Positive for myalgias. Negative for arthralgias, back pain and neck pain.   Skin:  Negative for rash.   Neurological:  Negative for dizziness, weakness, numbness and headaches.   Hematological:  Does not bruise/bleed easily.   Psychiatric/Behavioral:  Negative for dysphoric mood, sleep disturbance and suicidal ideas. The patient is nervous/anxious.    All other systems reviewed and are negative.       Objective:      Vitals:    01/11/23 1627   BP: 134/88   Pulse: 60   SpO2: 99%   Weight: 101.7 kg (224 lb 3.2 oz)   Height: 5' 4.5" (1.638 m)     Wt Readings from Last 3 Encounters:   01/11/23 101.7 kg (224 lb 3.2 oz)   12/21/22 105.9 kg (233 lb 6.4 oz)   09/21/22 101.2 kg (223 lb)       Physical Exam  Vitals reviewed.   Constitutional:       General: She is not in acute distress.     Appearance: Normal appearance. She is well-developed.      Comments: Obese     HENT:      Head: Normocephalic and atraumatic.      Right Ear: Tympanic membrane and ear canal normal.      Left Ear: Tympanic membrane and ear canal normal.      Nose: Nose normal.      " Mouth/Throat:      Mouth: Mucous membranes are moist.      Pharynx: No oropharyngeal exudate.   Eyes:      Pupils: Pupils are equal, round, and reactive to light.   Neck:      Thyroid: No thyromegaly.   Cardiovascular:      Rate and Rhythm: Normal rate and regular rhythm.      Heart sounds: Normal heart sounds. No murmur heard.    No friction rub.   Pulmonary:      Effort: Pulmonary effort is normal.      Breath sounds: Normal breath sounds. No wheezing, rhonchi or rales.   Abdominal:      General: Bowel sounds are normal. There is no distension.      Palpations: Abdomen is soft.      Tenderness: There is no abdominal tenderness.   Musculoskeletal:      Cervical back: Neck supple.   Lymphadenopathy:      Cervical: No cervical adenopathy.   Skin:     General: Skin is warm and dry.      Findings: No rash.   Neurological:      Mental Status: She is alert and oriented to person, place, and time.   Psychiatric:         Attention and Perception: She is attentive.         Speech: Speech normal.         Behavior: Behavior normal.         Thought Content: Thought content normal.         Judgment: Judgment normal.         Assessment:       1. Viral syndrome    2. Essential hypertension           Plan:       Viral syndrome  Comments:  Symptomatic. Will continue to monitor symptoms    Essential hypertension  Comments:  Controlled. Will continue to monitor BP on current medication        Follow up if symptoms worsen or fail to improve, for As scheduled.        1/11/2023 Fatuma Hollins

## 2023-03-06 DIAGNOSIS — F41.1 GAD (GENERALIZED ANXIETY DISORDER): ICD-10-CM

## 2023-03-06 RX ORDER — ALPRAZOLAM 0.5 MG/1
0.5 TABLET ORAL 2 TIMES DAILY
Qty: 60 TABLET | Refills: 2 | Status: SHIPPED | OUTPATIENT
Start: 2023-03-06 | End: 2023-03-29 | Stop reason: SDUPTHER

## 2023-03-06 NOTE — TELEPHONE ENCOUNTER
----- Message from Arielle Chavira sent at 3/6/2023 10:55 AM CST -----  Refill on: XANAX    [id number] - [place] - [address] [facility]    [phone]

## 2023-03-07 NOTE — TELEPHONE ENCOUNTER
The patient's prescription has been approved and sent to   [id number] - [place] - [address]  [address] [address]  Phone: [phone] Fax: [phone]

## 2023-03-16 ENCOUNTER — TELEPHONE (OUTPATIENT)
Dept: FAMILY MEDICINE | Facility: CLINIC | Age: 68
End: 2023-03-16

## 2023-03-28 LAB
ALBUMIN SERPL-MCNC: 4.4 G/DL (ref 3.8–4.8)
ALBUMIN/CREAT UR: 8 MG/G CREAT (ref 0–29)
ALBUMIN/GLOB SERPL: 2.1 {RATIO} (ref 1.2–2.2)
ALP SERPL-CCNC: 69 IU/L (ref 44–121)
ALT SERPL-CCNC: 33 IU/L (ref 0–32)
APPEARANCE UR: CLEAR
AST SERPL-CCNC: 19 IU/L (ref 0–40)
BACTERIA #/AREA URNS HPF: ABNORMAL /[HPF]
BACTERIA UR CULT: NORMAL
BACTERIA UR CULT: NORMAL
BASOPHILS # BLD AUTO: 0.1 X10E3/UL (ref 0–0.2)
BASOPHILS NFR BLD AUTO: 1 %
BILIRUB SERPL-MCNC: 0.4 MG/DL (ref 0–1.2)
BILIRUB UR QL STRIP: NEGATIVE
BUN SERPL-MCNC: 21 MG/DL (ref 8–27)
BUN/CREAT SERPL: 25 (ref 12–28)
CALCIUM SERPL-MCNC: 9.1 MG/DL (ref 8.7–10.3)
CASTS URNS QL MICRO: ABNORMAL /LPF
CHLORIDE SERPL-SCNC: 105 MMOL/L (ref 96–106)
CHOLEST SERPL-MCNC: 227 MG/DL (ref 100–199)
CO2 SERPL-SCNC: 21 MMOL/L (ref 20–29)
COLOR UR: YELLOW
CREAT SERPL-MCNC: 0.84 MG/DL (ref 0.57–1)
CREAT UR-MCNC: 85 MG/DL
EOSINOPHIL # BLD AUTO: 0.3 X10E3/UL (ref 0–0.4)
EOSINOPHIL NFR BLD AUTO: 3 %
EPI CELLS #/AREA URNS HPF: >10 /HPF (ref 0–10)
ERYTHROCYTE [DISTWIDTH] IN BLOOD BY AUTOMATED COUNT: 13.9 % (ref 11.7–15.4)
EST. GFR  (NO RACE VARIABLE): 77 ML/MIN/1.73
GLOBULIN SER CALC-MCNC: 2.1 G/DL (ref 1.5–4.5)
GLUCOSE SERPL-MCNC: 158 MG/DL (ref 70–99)
GLUCOSE UR QL STRIP: NEGATIVE
HBA1C MFR BLD: 7.2 % (ref 4.8–5.6)
HCT VFR BLD AUTO: 44 % (ref 34–46.6)
HDLC SERPL-MCNC: 78 MG/DL
HGB BLD-MCNC: 14.9 G/DL (ref 11.1–15.9)
HGB UR QL STRIP: NEGATIVE
IMM GRANULOCYTES # BLD AUTO: 0 X10E3/UL (ref 0–0.1)
IMM GRANULOCYTES NFR BLD AUTO: 0 %
KETONES UR QL STRIP: NEGATIVE
LDLC SERPL CALC-MCNC: 134 MG/DL (ref 0–99)
LEUKOCYTE ESTERASE UR QL STRIP: ABNORMAL
LYMPHOCYTES # BLD AUTO: 2.7 X10E3/UL (ref 0.7–3.1)
LYMPHOCYTES NFR BLD AUTO: 30 %
MCH RBC QN AUTO: 30.3 PG (ref 26.6–33)
MCHC RBC AUTO-ENTMCNC: 33.9 G/DL (ref 31.5–35.7)
MCV RBC AUTO: 90 FL (ref 79–97)
MICRO URNS: ABNORMAL
MICROALBUMIN UR-MCNC: 6.4 UG/ML
MONOCYTES # BLD AUTO: 0.9 X10E3/UL (ref 0.1–0.9)
MONOCYTES NFR BLD AUTO: 10 %
NEUTROPHILS # BLD AUTO: 5.1 X10E3/UL (ref 1.4–7)
NEUTROPHILS NFR BLD AUTO: 56 %
NITRITE UR QL STRIP: NEGATIVE
PH UR STRIP: 6 [PH] (ref 5–7.5)
PLATELET # BLD AUTO: 276 X10E3/UL (ref 150–450)
POTASSIUM SERPL-SCNC: 4.6 MMOL/L (ref 3.5–5.2)
PROT SERPL-MCNC: 6.5 G/DL (ref 6–8.5)
PROT UR QL STRIP: NEGATIVE
RBC # BLD AUTO: 4.91 X10E6/UL (ref 3.77–5.28)
RBC #/AREA URNS HPF: ABNORMAL /HPF (ref 0–2)
SODIUM SERPL-SCNC: 141 MMOL/L (ref 134–144)
SP GR UR STRIP: 1.02 (ref 1–1.03)
TRIGL SERPL-MCNC: 89 MG/DL (ref 0–149)
TSH SERPL DL<=0.005 MIU/L-ACNC: 2.16 UIU/ML (ref 0.45–4.5)
URINALYSIS REFLEX: ABNORMAL
UROBILINOGEN UR STRIP-MCNC: 0.2 MG/DL (ref 0.2–1)
VLDLC SERPL CALC-MCNC: 15 MG/DL (ref 5–40)
WBC # BLD AUTO: 9.1 X10E3/UL (ref 3.4–10.8)
WBC #/AREA URNS HPF: ABNORMAL /HPF (ref 0–5)

## 2023-03-29 ENCOUNTER — OFFICE VISIT (OUTPATIENT)
Dept: FAMILY MEDICINE | Facility: CLINIC | Age: 68
End: 2023-03-29
Payer: MEDICARE

## 2023-03-29 VITALS
HEIGHT: 65 IN | HEART RATE: 88 BPM | SYSTOLIC BLOOD PRESSURE: 136 MMHG | OXYGEN SATURATION: 100 % | DIASTOLIC BLOOD PRESSURE: 86 MMHG | WEIGHT: 234 LBS | BODY MASS INDEX: 38.99 KG/M2

## 2023-03-29 DIAGNOSIS — I10 ESSENTIAL HYPERTENSION: Primary | ICD-10-CM

## 2023-03-29 DIAGNOSIS — E11.9 TYPE 2 DIABETES MELLITUS WITHOUT COMPLICATION, WITHOUT LONG-TERM CURRENT USE OF INSULIN: ICD-10-CM

## 2023-03-29 DIAGNOSIS — F41.1 GAD (GENERALIZED ANXIETY DISORDER): ICD-10-CM

## 2023-03-29 DIAGNOSIS — E78.2 MIXED HYPERLIPIDEMIA: ICD-10-CM

## 2023-03-29 PROCEDURE — 3061F PR NEG MICROALBUMINURIA RESULT DOCUMENTED/REVIEW: ICD-10-PCS | Mod: CPTII,S$GLB,, | Performed by: FAMILY MEDICINE

## 2023-03-29 PROCEDURE — 1159F PR MEDICATION LIST DOCUMENTED IN MEDICAL RECORD: ICD-10-PCS | Mod: CPTII,S$GLB,, | Performed by: FAMILY MEDICINE

## 2023-03-29 PROCEDURE — 3288F FALL RISK ASSESSMENT DOCD: CPT | Mod: CPTII,S$GLB,, | Performed by: FAMILY MEDICINE

## 2023-03-29 PROCEDURE — 99214 PR OFFICE/OUTPT VISIT, EST, LEVL IV, 30-39 MIN: ICD-10-PCS | Mod: S$GLB,,, | Performed by: FAMILY MEDICINE

## 2023-03-29 PROCEDURE — 3008F PR BODY MASS INDEX (BMI) DOCUMENTED: ICD-10-PCS | Mod: CPTII,S$GLB,, | Performed by: FAMILY MEDICINE

## 2023-03-29 PROCEDURE — 1160F RVW MEDS BY RX/DR IN RCRD: CPT | Mod: CPTII,S$GLB,, | Performed by: FAMILY MEDICINE

## 2023-03-29 PROCEDURE — 1160F PR REVIEW ALL MEDS BY PRESCRIBER/CLIN PHARMACIST DOCUMENTED: ICD-10-PCS | Mod: CPTII,S$GLB,, | Performed by: FAMILY MEDICINE

## 2023-03-29 PROCEDURE — 3079F DIAST BP 80-89 MM HG: CPT | Mod: CPTII,S$GLB,, | Performed by: FAMILY MEDICINE

## 2023-03-29 PROCEDURE — 99214 OFFICE O/P EST MOD 30 MIN: CPT | Mod: S$GLB,,, | Performed by: FAMILY MEDICINE

## 2023-03-29 PROCEDURE — 3075F PR MOST RECENT SYSTOLIC BLOOD PRESS GE 130-139MM HG: ICD-10-PCS | Mod: CPTII,S$GLB,, | Performed by: FAMILY MEDICINE

## 2023-03-29 PROCEDURE — 4010F PR ACE/ARB THEARPY RXD/TAKEN: ICD-10-PCS | Mod: CPTII,S$GLB,, | Performed by: FAMILY MEDICINE

## 2023-03-29 PROCEDURE — 3288F PR FALLS RISK ASSESSMENT DOCUMENTED: ICD-10-PCS | Mod: CPTII,S$GLB,, | Performed by: FAMILY MEDICINE

## 2023-03-29 PROCEDURE — 3008F BODY MASS INDEX DOCD: CPT | Mod: CPTII,S$GLB,, | Performed by: FAMILY MEDICINE

## 2023-03-29 PROCEDURE — 3061F NEG MICROALBUMINURIA REV: CPT | Mod: CPTII,S$GLB,, | Performed by: FAMILY MEDICINE

## 2023-03-29 PROCEDURE — 1101F PT FALLS ASSESS-DOCD LE1/YR: CPT | Mod: CPTII,S$GLB,, | Performed by: FAMILY MEDICINE

## 2023-03-29 PROCEDURE — 3051F PR MOST RECENT HEMOGLOBIN A1C LEVEL 7.0 - < 8.0%: ICD-10-PCS | Mod: CPTII,S$GLB,, | Performed by: FAMILY MEDICINE

## 2023-03-29 PROCEDURE — 3051F HG A1C>EQUAL 7.0%<8.0%: CPT | Mod: CPTII,S$GLB,, | Performed by: FAMILY MEDICINE

## 2023-03-29 PROCEDURE — 3066F PR DOCUMENTATION OF TREATMENT FOR NEPHROPATHY: ICD-10-PCS | Mod: CPTII,S$GLB,, | Performed by: FAMILY MEDICINE

## 2023-03-29 PROCEDURE — 4010F ACE/ARB THERAPY RXD/TAKEN: CPT | Mod: CPTII,S$GLB,, | Performed by: FAMILY MEDICINE

## 2023-03-29 PROCEDURE — 3066F NEPHROPATHY DOC TX: CPT | Mod: CPTII,S$GLB,, | Performed by: FAMILY MEDICINE

## 2023-03-29 PROCEDURE — 3075F SYST BP GE 130 - 139MM HG: CPT | Mod: CPTII,S$GLB,, | Performed by: FAMILY MEDICINE

## 2023-03-29 PROCEDURE — 3079F PR MOST RECENT DIASTOLIC BLOOD PRESSURE 80-89 MM HG: ICD-10-PCS | Mod: CPTII,S$GLB,, | Performed by: FAMILY MEDICINE

## 2023-03-29 PROCEDURE — 1159F MED LIST DOCD IN RCRD: CPT | Mod: CPTII,S$GLB,, | Performed by: FAMILY MEDICINE

## 2023-03-29 PROCEDURE — 1101F PR PT FALLS ASSESS DOC 0-1 FALLS W/OUT INJ PAST YR: ICD-10-PCS | Mod: CPTII,S$GLB,, | Performed by: FAMILY MEDICINE

## 2023-03-29 RX ORDER — LOSARTAN POTASSIUM 100 MG/1
100 TABLET ORAL DAILY
Qty: 90 TABLET | Refills: 3 | Status: SHIPPED | OUTPATIENT
Start: 2023-03-29 | End: 2024-02-01 | Stop reason: SDUPTHER

## 2023-03-29 RX ORDER — GLIMEPIRIDE 2 MG/1
2 TABLET ORAL 2 TIMES DAILY
Qty: 180 TABLET | Refills: 3 | Status: SHIPPED | OUTPATIENT
Start: 2023-03-29 | End: 2024-02-01 | Stop reason: SDUPTHER

## 2023-03-29 RX ORDER — AMLODIPINE BESYLATE 2.5 MG/1
2.5 TABLET ORAL DAILY
Qty: 90 TABLET | Refills: 3 | Status: SHIPPED | OUTPATIENT
Start: 2023-03-29 | End: 2024-02-01 | Stop reason: SDUPTHER

## 2023-03-29 RX ORDER — ALPRAZOLAM 0.5 MG/1
0.5 TABLET ORAL 2 TIMES DAILY
Qty: 60 TABLET | Refills: 5 | Status: SHIPPED | OUTPATIENT
Start: 2023-03-29 | End: 2023-07-28 | Stop reason: SDUPTHER

## 2023-03-29 RX ORDER — METFORMIN HYDROCHLORIDE 500 MG/1
500 TABLET, EXTENDED RELEASE ORAL 2 TIMES DAILY WITH MEALS
Qty: 180 TABLET | Refills: 3 | Status: SHIPPED | OUTPATIENT
Start: 2023-03-29 | End: 2024-02-01 | Stop reason: SDUPTHER

## 2023-03-29 NOTE — PROGRESS NOTES
SUBJECTIVE:    Patient ID: Della Juarez is a 67 y.o. female.    Chief Complaint: Hypertension (DM, went over meds verbally// SW)      Pt here to checkup on acute and chronic conditions.    A1c is 7.2%.  Still walking for exercise and watching diet.  Working 2 jobs at the time.     BP is doing ok. No longer having headaches. Has gained back 10 pounds since last visit.       Had labs done: fBS 158, GFR 77, , TSH 2.16, micro/Cr 8    Continues to have some anxiety, and takes xanax as needed. Has had a very stressful 2 weeks as her dog just got dx with DM. Has to give dog injection twice a week.       Knees hurt all the time, and she has to stand up all day in both of her jobs. Takes tyelnol or ibuprofen.   --------------------------------------------------------  Mammogram nl, 10/2022.                      Refuses colonoscopy.  Will prefer to take cologuard, still has to do.  Eye exams by (Vibha)  Pt not taking statin due to not wanting to in preference to working on diet and exercise.          Office Visit on 12/21/2022   Component Date Value Ref Range Status    Hemoglobin A1C, POC 12/21/2022 6.3  % Final    Glucose 03/24/2023 158 (H)  70 - 99 mg/dL Final    BUN 03/24/2023 21  8 - 27 mg/dL Final    Creatinine 03/24/2023 0.84  0.57 - 1.00 mg/dL Final    eGFR 03/24/2023 77  >59 mL/min/1.73 Final    BUN/Creatinine Ratio 03/24/2023 25  12 - 28 Final    Sodium 03/24/2023 141  134 - 144 mmol/L Final    Potassium 03/24/2023 4.6  3.5 - 5.2 mmol/L Final    Chloride 03/24/2023 105  96 - 106 mmol/L Final    CO2 03/24/2023 21  20 - 29 mmol/L Final    Calcium 03/24/2023 9.1  8.7 - 10.3 mg/dL Final    Protein, Total 03/24/2023 6.5  6.0 - 8.5 g/dL Final    Albumin 03/24/2023 4.4  3.8 - 4.8 g/dL Final    Globulin, Total 03/24/2023 2.1  1.5 - 4.5 g/dL Final    Albumin/Globulin Ratio 03/24/2023 2.1  1.2 - 2.2 Final    Total Bilirubin 03/24/2023 0.4  0.0 - 1.2 mg/dL Final    Alkaline Phosphatase 03/24/2023 69  44 - 121 IU/L  Final    AST 03/24/2023 19  0 - 40 IU/L Final    ALT 03/24/2023 33 (H)  0 - 32 IU/L Final    Cholesterol 03/24/2023 227 (H)  100 - 199 mg/dL Final    Triglycerides 03/24/2023 89  0 - 149 mg/dL Final    HDL 03/24/2023 78  >39 mg/dL Final    VLDL Cholesterol Storm 03/24/2023 15  5 - 40 mg/dL Final    LDL Calculated 03/24/2023 134 (H)  0 - 99 mg/dL Final    Specific Gravity, UA 03/24/2023 1.016  1.005 - 1.030 Final    pH, UA 03/24/2023 6.0  5.0 - 7.5 Final    Color, UA 03/24/2023 Yellow  Yellow Final    Clarity, UA 03/24/2023 Clear  Clear Final    Leukocytes, UA 03/24/2023 1+ (A)  Negative Final    Protein, UA 03/24/2023 Negative  Negative/Trace Final    Glucose, UA 03/24/2023 Negative  Negative Final    Ketones, UA 03/24/2023 Negative  Negative Final    Occult Blood UA 03/24/2023 Negative  Negative Final    Bilirubin, UA 03/24/2023 Negative  Negative Final    Urobilinogen, UA 03/24/2023 0.2  0.2 - 1.0 mg/dL Final    Nitrite, UA 03/24/2023 Negative  Negative Final    Microscopic Examination 03/24/2023 See below:   Final    Urinalysis Reflex 03/24/2023 Comment   Final    TSH 03/24/2023 2.160  0.450 - 4.500 uIU/mL Final    WBC 03/24/2023 9.1  3.4 - 10.8 x10E3/uL Final    RBC 03/24/2023 4.91  3.77 - 5.28 x10E6/uL Final    Hemoglobin 03/24/2023 14.9  11.1 - 15.9 g/dL Final    Hematocrit 03/24/2023 44.0  34.0 - 46.6 % Final    MCV 03/24/2023 90  79 - 97 fL Final    MCH 03/24/2023 30.3  26.6 - 33.0 pg Final    MCHC 03/24/2023 33.9  31.5 - 35.7 g/dL Final    RDW 03/24/2023 13.9  11.7 - 15.4 % Final    Platelets 03/24/2023 276  150 - 450 x10E3/uL Final    Neutrophils 03/24/2023 56  Not Estab. % Final    Lymphs 03/24/2023 30  Not Estab. % Final    Monocytes 03/24/2023 10  Not Estab. % Final    Eos 03/24/2023 3  Not Estab. % Final    Basos 03/24/2023 1  Not Estab. % Final    Neutrophils (Absolute) 03/24/2023 5.1  1.4 - 7.0 x10E3/uL Final    Lymphs (Absolute) 03/24/2023 2.7  0.7 - 3.1 x10E3/uL Final    Monocytes(Absolute)  2023 0.9  0.1 - 0.9 x10E3/uL Final    Eos (Absolute) 2023 0.3  0.0 - 0.4 x10E3/uL Final    Baso (Absolute) 2023 0.1  0.0 - 0.2 x10E3/uL Final    Immature Granulocytes 2023 0  Not Estab. % Final    Immature Grans (Abs) 2023 0.0  0.0 - 0.1 x10E3/uL Final    Hemoglobin A1c 2023 7.2 (H)  4.8 - 5.6 % Final    Creatinine, Urine 2023 85.0  Not Estab. mg/dL Final    Microalb, Ur 2023 6.4  Not Estab. ug/mL Final    Microalb/Crt. Ratio 2023 8  0 - 29 mg/g creat Final    WBC, UA 2023 0-5  0 - 5 /hpf Final    RBC, UA 2023 None seen  0 - 2 /hpf Final    Epithelial Cells (non renal) 2023 >10 (A)  0 - 10 /hpf Final    Casts 2023 None seen  None seen /lpf Final    Bacteria, UA 2023 Few  None seen/Few Final    Urine Culture, Comprehensive 2023 Final report   Final    Result 2023 Comment   Final       Past Medical History:   Diagnosis Date    Diabetes mellitus     Hypertension     Mental disorder     Anxiety     Social History     Socioeconomic History    Marital status:    Tobacco Use    Smoking status: Former     Packs/day: 0.50     Types: Cigarettes     Start date:      Quit date: 2016     Years since quittin.2     Passive exposure: Never    Smokeless tobacco: Never   Substance and Sexual Activity    Alcohol use: No    Drug use: No    Sexual activity: Not Currently     Past Surgical History:   Procedure Laterality Date     SECTION      x3    COSMETIC SURGERY      Scar on right side of face     Family History   Problem Relation Age of Onset    Diabetes Maternal Grandmother     Hypertension Maternal Grandmother     Stroke Maternal Grandmother     Diabetes Father     Hypertension Father     Hemochromatosis Mother     Hypertension Mother     Stroke Mother     Breast cancer Paternal Aunt     Cancer Neg Hx     Colon cancer Neg Hx     Ovarian cancer Neg Hx     Eclampsia Neg Hx     Miscarriages / Stillbirths Neg Hx       labor Neg Hx        Review of patient's allergies indicates:   Allergen Reactions    Penicillins      Other reaction(s): Vomiting  Other reaction(s): Swelling  Other reaction(s): Nausea  Other reaction(s): Hives       Current Outpatient Medications:     ascorbic acid, vitamin C, (VITAMIN C) 500 MG tablet, Take 500 mg by mouth once daily., Disp: , Rfl:     aspirin (ECOTRIN) 81 MG EC tablet, Take 1 tablet (81 mg total) by mouth once daily., Disp: , Rfl: 0    blood sugar diagnostic Strp, To check BG two times daily, to use with insurance preferred meter, Disp: 200 each, Rfl: 1    blood-glucose meter kit, To check BG two times daily, to use with insurance preferred meter, Disp: 1 each, Rfl: 0    clotrimazole-betamethasone 1-0.05% (LOTRISONE) cream, Apply topically 2 (two) times daily., Disp: 45 g, Rfl: 1    cyanocobalamin 2000 MCG tablet, Take 2,000 mcg by mouth once daily., Disp: , Rfl:     lancets Misc, To check BG two times daily, to use with insurance preferred meter, Disp: 200 each, Rfl: 1    multivitamin capsule, Take 1 capsule by mouth once daily., Disp: , Rfl:     vitamin D (VITAMIN D3) 1000 units Tab, Take 5,000 Units by mouth once daily., Disp: , Rfl:     zinc gluconate 50 mg tablet, Take 50 mg by mouth once daily., Disp: , Rfl:     ALPRAZolam (XANAX) 0.5 MG tablet, Take 1 tablet (0.5 mg total) by mouth 2 (two) times daily., Disp: 60 tablet, Rfl: 5    amLODIPine (NORVASC) 2.5 MG tablet, Take 1 tablet (2.5 mg total) by mouth once daily., Disp: 90 tablet, Rfl: 3    glimepiride (AMARYL) 2 MG tablet, Take 1 tablet (2 mg total) by mouth 2 (two) times a day., Disp: 180 tablet, Rfl: 3    losartan (COZAAR) 100 MG tablet, Take 1 tablet (100 mg total) by mouth once daily., Disp: 90 tablet, Rfl: 3    metFORMIN (GLUCOPHAGE-XR) 500 MG ER 24hr tablet, Take 1 tablet (500 mg total) by mouth 2 (two) times daily with meals., Disp: 180 tablet, Rfl: 3    Review of Systems   Constitutional:  Negative for appetite change,  "fatigue, fever and unexpected weight change.   Respiratory:  Negative for cough, chest tightness, shortness of breath and wheezing.    Cardiovascular:  Negative for chest pain and leg swelling.   Gastrointestinal:  Negative for abdominal pain, constipation, nausea and vomiting.        -heartburn   Genitourinary:  Negative for difficulty urinating, dysuria, frequency and urgency.   Musculoskeletal:  Negative for arthralgias, back pain, myalgias and neck pain.   Skin:  Negative for rash.   Neurological:  Positive for weakness (right knee). Negative for dizziness, numbness and headaches.   Hematological:  Does not bruise/bleed easily.   Psychiatric/Behavioral:  Negative for dysphoric mood, sleep disturbance and suicidal ideas. The patient is nervous/anxious.    All other systems reviewed and are negative.       Objective:      Vitals:    03/29/23 1430   BP: 136/86   Pulse: 88   SpO2: 100%   Weight: 106.1 kg (234 lb)   Height: 5' 4.5" (1.638 m)     Wt Readings from Last 3 Encounters:   03/29/23 106.1 kg (234 lb)   01/11/23 101.7 kg (224 lb 3.2 oz)   12/21/22 105.9 kg (233 lb 6.4 oz)       Physical Exam  Vitals reviewed.   Constitutional:       General: She is not in acute distress.     Appearance: Normal appearance. She is well-developed.      Comments: Obese     HENT:      Head: Normocephalic and atraumatic.   Neck:      Thyroid: No thyromegaly.   Cardiovascular:      Rate and Rhythm: Normal rate and regular rhythm.      Heart sounds: Normal heart sounds. No murmur heard.    No friction rub.   Pulmonary:      Effort: Pulmonary effort is normal.      Breath sounds: Normal breath sounds. No wheezing or rales.   Abdominal:      General: Bowel sounds are normal. There is no distension.      Palpations: Abdomen is soft.      Tenderness: There is no abdominal tenderness.   Musculoskeletal:      Cervical back: Neck supple.   Lymphadenopathy:      Cervical: No cervical adenopathy.   Skin:     General: Skin is warm and dry.     "  Findings: No rash.   Neurological:      Mental Status: She is alert and oriented to person, place, and time.   Psychiatric:         Attention and Perception: She is attentive.         Speech: Speech normal.         Behavior: Behavior normal.         Thought Content: Thought content normal.         Judgment: Judgment normal.         Assessment:       1. Essential hypertension    2. Type 2 diabetes mellitus without complication, without long-term current use of insulin    3. MELODY (generalized anxiety disorder)    4. Mixed hyperlipidemia         Plan:       Essential hypertension  Comments:  Uncontrolled. Will continue to monitor BP on current medication regimen  Orders:  -     amLODIPine (NORVASC) 2.5 MG tablet; Take 1 tablet (2.5 mg total) by mouth once daily.  Dispense: 90 tablet; Refill: 3  -     losartan (COZAAR) 100 MG tablet; Take 1 tablet (100 mg total) by mouth once daily.  Dispense: 90 tablet; Refill: 3    Type 2 diabetes mellitus without complication, without long-term current use of insulin  Comments:  Uncontrolled. A1c 7.2%. To resume ADA diet, regular BS checks, regular exercise. Will continue to monitor A1c.  Orders:  -     glimepiride (AMARYL) 2 MG tablet; Take 1 tablet (2 mg total) by mouth 2 (two) times a day.  Dispense: 180 tablet; Refill: 3  -     metFORMIN (GLUCOPHAGE-XR) 500 MG ER 24hr tablet; Take 1 tablet (500 mg total) by mouth 2 (two) times daily with meals.  Dispense: 180 tablet; Refill: 3    MELODY (generalized anxiety disorder)  Comments:  Uncontrolled.  Will continue to monitor symptoms  Orders:  -     ALPRAZolam (XANAX) 0.5 MG tablet; Take 1 tablet (0.5 mg total) by mouth 2 (two) times daily.  Dispense: 60 tablet; Refill: 5    Mixed hyperlipidemia  Comments:  Suboptimal. . Pt encouraged to improve diet      Other  Lab results discussed and reviewed with patient.  Labs and/or tests have been ordered for the evaluation/monitoring of acute/chronic conditions, to be done just before next  visit.    Follow up in about 3 months (around 6/29/2023) for DM, HTN, Anxiety.        3/29/2023 Fatuma Hollins

## 2023-04-11 ENCOUNTER — PATIENT MESSAGE (OUTPATIENT)
Dept: ADMINISTRATIVE | Facility: HOSPITAL | Age: 68
End: 2023-04-11

## 2023-07-28 ENCOUNTER — OFFICE VISIT (OUTPATIENT)
Dept: FAMILY MEDICINE | Facility: CLINIC | Age: 68
End: 2023-07-28
Payer: MEDICARE

## 2023-07-28 VITALS
HEIGHT: 65 IN | DIASTOLIC BLOOD PRESSURE: 90 MMHG | OXYGEN SATURATION: 98 % | HEART RATE: 80 BPM | WEIGHT: 233 LBS | BODY MASS INDEX: 38.82 KG/M2 | SYSTOLIC BLOOD PRESSURE: 160 MMHG

## 2023-07-28 DIAGNOSIS — E11.9 TYPE 2 DIABETES MELLITUS WITHOUT COMPLICATION, WITHOUT LONG-TERM CURRENT USE OF INSULIN: Primary | ICD-10-CM

## 2023-07-28 DIAGNOSIS — M79.604 PAIN OF RIGHT LOWER EXTREMITY: ICD-10-CM

## 2023-07-28 DIAGNOSIS — I10 PRIMARY HYPERTENSION: ICD-10-CM

## 2023-07-28 DIAGNOSIS — Z76.0 MEDICATION REFILL: ICD-10-CM

## 2023-07-28 LAB — HBA1C MFR BLD: 6.6 %

## 2023-07-28 PROCEDURE — 4010F ACE/ARB THERAPY RXD/TAKEN: CPT | Mod: CPTII,S$GLB,, | Performed by: FAMILY MEDICINE

## 2023-07-28 PROCEDURE — 3077F SYST BP >= 140 MM HG: CPT | Mod: CPTII,S$GLB,, | Performed by: FAMILY MEDICINE

## 2023-07-28 PROCEDURE — 3061F NEG MICROALBUMINURIA REV: CPT | Mod: CPTII,S$GLB,, | Performed by: FAMILY MEDICINE

## 2023-07-28 PROCEDURE — 1160F PR REVIEW ALL MEDS BY PRESCRIBER/CLIN PHARMACIST DOCUMENTED: ICD-10-PCS | Mod: CPTII,S$GLB,, | Performed by: FAMILY MEDICINE

## 2023-07-28 PROCEDURE — 1159F PR MEDICATION LIST DOCUMENTED IN MEDICAL RECORD: ICD-10-PCS | Mod: CPTII,S$GLB,, | Performed by: FAMILY MEDICINE

## 2023-07-28 PROCEDURE — 83036 HEMOGLOBIN GLYCOSYLATED A1C: CPT | Mod: QW,,, | Performed by: FAMILY MEDICINE

## 2023-07-28 PROCEDURE — 3080F DIAST BP >= 90 MM HG: CPT | Mod: CPTII,S$GLB,, | Performed by: FAMILY MEDICINE

## 2023-07-28 PROCEDURE — 1101F PT FALLS ASSESS-DOCD LE1/YR: CPT | Mod: CPTII,S$GLB,, | Performed by: FAMILY MEDICINE

## 2023-07-28 PROCEDURE — 3077F PR MOST RECENT SYSTOLIC BLOOD PRESSURE >= 140 MM HG: ICD-10-PCS | Mod: CPTII,S$GLB,, | Performed by: FAMILY MEDICINE

## 2023-07-28 PROCEDURE — 3044F HG A1C LEVEL LT 7.0%: CPT | Mod: CPTII,S$GLB,, | Performed by: FAMILY MEDICINE

## 2023-07-28 PROCEDURE — 3288F FALL RISK ASSESSMENT DOCD: CPT | Mod: CPTII,S$GLB,, | Performed by: FAMILY MEDICINE

## 2023-07-28 PROCEDURE — 83036 POCT HEMOGLOBIN A1C: ICD-10-PCS | Mod: QW,,, | Performed by: FAMILY MEDICINE

## 2023-07-28 PROCEDURE — 1159F MED LIST DOCD IN RCRD: CPT | Mod: CPTII,S$GLB,, | Performed by: FAMILY MEDICINE

## 2023-07-28 PROCEDURE — 3066F NEPHROPATHY DOC TX: CPT | Mod: CPTII,S$GLB,, | Performed by: FAMILY MEDICINE

## 2023-07-28 PROCEDURE — 3080F PR MOST RECENT DIASTOLIC BLOOD PRESSURE >= 90 MM HG: ICD-10-PCS | Mod: CPTII,S$GLB,, | Performed by: FAMILY MEDICINE

## 2023-07-28 PROCEDURE — 3288F PR FALLS RISK ASSESSMENT DOCUMENTED: ICD-10-PCS | Mod: CPTII,S$GLB,, | Performed by: FAMILY MEDICINE

## 2023-07-28 PROCEDURE — 99214 PR OFFICE/OUTPT VISIT, EST, LEVL IV, 30-39 MIN: ICD-10-PCS | Mod: S$GLB,,, | Performed by: FAMILY MEDICINE

## 2023-07-28 PROCEDURE — 1160F RVW MEDS BY RX/DR IN RCRD: CPT | Mod: CPTII,S$GLB,, | Performed by: FAMILY MEDICINE

## 2023-07-28 PROCEDURE — 3061F PR NEG MICROALBUMINURIA RESULT DOCUMENTED/REVIEW: ICD-10-PCS | Mod: CPTII,S$GLB,, | Performed by: FAMILY MEDICINE

## 2023-07-28 PROCEDURE — 3044F PR MOST RECENT HEMOGLOBIN A1C LEVEL <7.0%: ICD-10-PCS | Mod: CPTII,S$GLB,, | Performed by: FAMILY MEDICINE

## 2023-07-28 PROCEDURE — 1101F PR PT FALLS ASSESS DOC 0-1 FALLS W/OUT INJ PAST YR: ICD-10-PCS | Mod: CPTII,S$GLB,, | Performed by: FAMILY MEDICINE

## 2023-07-28 PROCEDURE — 4010F PR ACE/ARB THEARPY RXD/TAKEN: ICD-10-PCS | Mod: CPTII,S$GLB,, | Performed by: FAMILY MEDICINE

## 2023-07-28 PROCEDURE — 3008F BODY MASS INDEX DOCD: CPT | Mod: CPTII,S$GLB,, | Performed by: FAMILY MEDICINE

## 2023-07-28 PROCEDURE — 3066F PR DOCUMENTATION OF TREATMENT FOR NEPHROPATHY: ICD-10-PCS | Mod: CPTII,S$GLB,, | Performed by: FAMILY MEDICINE

## 2023-07-28 PROCEDURE — 99214 OFFICE O/P EST MOD 30 MIN: CPT | Mod: S$GLB,,, | Performed by: FAMILY MEDICINE

## 2023-07-28 PROCEDURE — 3008F PR BODY MASS INDEX (BMI) DOCUMENTED: ICD-10-PCS | Mod: CPTII,S$GLB,, | Performed by: FAMILY MEDICINE

## 2023-07-28 RX ORDER — ALPRAZOLAM 0.5 MG/1
0.5 TABLET ORAL 2 TIMES DAILY
Qty: 60 TABLET | Refills: 5 | Status: SHIPPED | OUTPATIENT
Start: 2023-07-28 | End: 2024-02-01 | Stop reason: SDUPTHER

## 2023-07-28 NOTE — LETTER
[address]  [place] [address]  Phone: [phone]   [phone]                        MD Fatuma Strong MD Chequita Williams, MD Matthew Bassett, PA-C Linda Melerine, NP Jodi Powell, NP Hunter Reed, PA-C      Date: 07/28/2023        Patient: Della Juarez  YOB: 1955    Please sent the most recent Eye exam.         Sincerely,     Lauren Vang MA

## 2023-07-28 NOTE — PROGRESS NOTES
SUBJECTIVE:    Patient ID: Della Juarez is a 67 y.o. female.    Chief Complaint: Diabetes (A1c, Foot exam ordered, no bottles, requested Eye exam Dr Dunne, right foot and leg pain, abc )      Pt here to checkup on acute and chronic conditions.    A1c is 6.6%.  Working 2 jobs at the time.     BP is elevated today. Took meds. Denies CP/AH/SOB. Weight is stable.    Has been having pain in the right foot for a few weeks. Started when she went to stand up out of the bed. Could not put pressure on the foot at one point. Foot has been swollen. The pain has been traveling up her leg and now is up to her knee.  Has some splotchiiness on her medial right leg.  Stands all day. Takes ibuprofen as needed. It continues to bother her.  States it feels like a shooting pain going up her leg.     Still has a lesion on her dorsal foot that has been there for years that has not gone away with creams.     No recent labs.     Continues to have some anxiety, and takes xanax as needed. Has had a very stressful 2 weeks as her dog just got dx with DM. Has to give dog injection twice a week.     --------------------------------------------------------  Mammogram nl, 10/2022.                      Refuses colonoscopy.  Will prefer to take cologuard, still has to do.  Eye exams by ([facility])  Pt not taking statin due to not wanting to in preference to working on diet and exercise.          Office Visit on 2023   Component Date Value Ref Range Status    Hemoglobin A1C, POC 2023 6.6  % Final       Past Medical History:   Diagnosis Date    Diabetes mellitus     Hypertension     Mental disorder     Anxiety     Social History     Socioeconomic History    Marital status:    Tobacco Use    Smoking status: Former     Packs/day: 0.50     Types: Cigarettes     Start date:      Quit date: 2016     Years since quittin.5     Passive exposure: Never    Smokeless tobacco: Never   Substance and Sexual Activity    Alcohol use: No     Drug use: No    Sexual activity: Not Currently     Past Surgical History:   Procedure Laterality Date     SECTION      x3    COSMETIC SURGERY      Scar on right side of face     Family History   Problem Relation Age of Onset    Diabetes Maternal Grandmother     Hypertension Maternal Grandmother     Stroke Maternal Grandmother     Diabetes Father     Hypertension Father     Hemochromatosis Mother     Hypertension Mother     Stroke Mother     Breast cancer Paternal Aunt     Cancer Neg Hx     Colon cancer Neg Hx     Ovarian cancer Neg Hx     Eclampsia Neg Hx     Miscarriages / Stillbirths Neg Hx      labor Neg Hx        Review of patient's allergies indicates:   Allergen Reactions    Penicillins      Other reaction(s): Vomiting  Other reaction(s): Swelling  Other reaction(s): Nausea  Other reaction(s): Hives       Current Outpatient Medications:     amLODIPine (NORVASC) 2.5 MG tablet, Take 1 tablet (2.5 mg total) by mouth once daily., Disp: 90 tablet, Rfl: 3    ascorbic acid, vitamin C, (VITAMIN C) 500 MG tablet, Take 500 mg by mouth once daily., Disp: , Rfl:     aspirin (ECOTRIN) 81 MG EC tablet, Take 1 tablet (81 mg total) by mouth once daily., Disp: , Rfl: 0    blood sugar diagnostic Strp, To check BG two times daily, to use with insurance preferred meter, Disp: 200 each, Rfl: 1    blood-glucose meter kit, To check BG two times daily, to use with insurance preferred meter, Disp: 1 each, Rfl: 0    clotrimazole-betamethasone 1-0.05% (LOTRISONE) cream, Apply topically 2 (two) times daily., Disp: 45 g, Rfl: 1    cyanocobalamin 2000 MCG tablet, Take 2,000 mcg by mouth once daily., Disp: , Rfl:     glimepiride (AMARYL) 2 MG tablet, Take 1 tablet (2 mg total) by mouth 2 (two) times a day., Disp: 180 tablet, Rfl: 3    lancets Misc, To check BG two times daily, to use with insurance preferred meter, Disp: 200 each, Rfl: 1    losartan (COZAAR) 100 MG tablet, Take 1 tablet (100 mg total) by mouth once daily.,  "Disp: 90 tablet, Rfl: 3    metFORMIN (GLUCOPHAGE-XR) 500 MG ER 24hr tablet, Take 1 tablet (500 mg total) by mouth 2 (two) times daily with meals., Disp: 180 tablet, Rfl: 3    multivitamin capsule, Take 1 capsule by mouth once daily., Disp: , Rfl:     vitamin D (VITAMIN D3) 1000 units Tab, Take 5,000 Units by mouth once daily., Disp: , Rfl:     zinc gluconate 50 mg tablet, Take 50 mg by mouth once daily., Disp: , Rfl:     ALPRAZolam (XANAX) 0.5 MG tablet, Take 1 tablet (0.5 mg total) by mouth 2 (two) times daily., Disp: 60 tablet, Rfl: 5    Review of Systems   Constitutional:  Negative for appetite change, fatigue, fever and unexpected weight change.   Respiratory:  Negative for cough, chest tightness, shortness of breath and wheezing.    Cardiovascular:  Positive for leg swelling (left only). Negative for chest pain.   Gastrointestinal:  Negative for abdominal pain, constipation, nausea and vomiting.        -heartburn   Genitourinary:  Negative for difficulty urinating, dysuria, frequency and urgency.   Musculoskeletal:  Negative for arthralgias, back pain, myalgias and neck pain.   Skin:  Negative for rash.   Neurological:  Positive for weakness (right knee). Negative for dizziness, numbness and headaches.   Hematological:  Does not bruise/bleed easily.   Psychiatric/Behavioral:  Negative for dysphoric mood, sleep disturbance and suicidal ideas. The patient is nervous/anxious.    All other systems reviewed and are negative.       Objective:      Vitals:    07/28/23 1115 07/28/23 1118   BP: (!) 160/90 (!) 160/90   Pulse: 80    SpO2: 98%    Weight: 105.7 kg (233 lb)    Height: 5' 4.5" (1.638 m)      Wt Readings from Last 3 Encounters:   07/28/23 105.7 kg (233 lb)   03/29/23 106.1 kg (234 lb)   01/11/23 101.7 kg (224 lb 3.2 oz)       Physical Exam  Vitals reviewed.   Constitutional:       General: She is not in acute distress.     Appearance: Normal appearance. She is well-developed.      Comments: Obese     HENT:     "  Head: Normocephalic and atraumatic.   Neck:      Thyroid: No thyromegaly.   Cardiovascular:      Rate and Rhythm: Normal rate and regular rhythm.      Heart sounds: Normal heart sounds. No murmur heard.    No friction rub.   Pulmonary:      Effort: Pulmonary effort is normal.      Breath sounds: Normal breath sounds. No wheezing or rales.   Abdominal:      General: Bowel sounds are normal. There is no distension.      Palpations: Abdomen is soft.      Tenderness: There is no abdominal tenderness.   Musculoskeletal:      Cervical back: Neck supple.      Right lower leg: Swelling present. No bony tenderness. No edema.      Right ankle: Swelling present. Normal range of motion.      Right Achilles Tendon: No tenderness.      Right foot: Normal range of motion. Swelling present.   Lymphadenopathy:      Cervical: No cervical adenopathy.   Skin:     General: Skin is warm and dry.      Findings: No rash.   Neurological:      Mental Status: She is alert and oriented to person, place, and time.   Psychiatric:         Attention and Perception: She is attentive.         Speech: Speech normal.         Behavior: Behavior normal.         Thought Content: Thought content normal.         Judgment: Judgment normal.         Assessment:       1. Type 2 diabetes mellitus without complication, without long-term current use of insulin    2. Primary hypertension    3. Pain of right lower extremity    4. Medication refill         Plan:       Type 2 diabetes mellitus without complication, without long-term current use of insulin  Comments:  Controlled. A1c 6.6%. To continue ADA diet, regular BS checks, regular exercise. Will continue A1c.   Orders:  -     Hemoglobin A1C, POCT    Primary hypertension  Comments:  Uncontrolled. Likely increased by pain    Pain of right lower extremity  Comments:  Will get Ultrasound of lesion  Orders:  -     US Lower Extremity Veins Right; Future; Expected date: 07/28/2023    Medication refill  -      ALPRAZolam (XANAX) 0.5 MG tablet; Take 1 tablet (0.5 mg total) by mouth 2 (two) times daily.  Dispense: 60 tablet; Refill: 5      Labs and/or tests have been ordered for the evaluation/monitoring of acute/chronic conditions, to be done just before next visit.    Follow up in about 3 months (around 10/28/2023) for HTN, DM.        7/28/2023 Fatuma Hollins

## 2023-07-31 ENCOUNTER — TELEPHONE (OUTPATIENT)
Dept: FAMILY MEDICINE | Facility: CLINIC | Age: 68
End: 2023-07-31

## 2023-07-31 DIAGNOSIS — M79.604 PAIN OF RIGHT LOWER EXTREMITY: Primary | ICD-10-CM

## 2023-07-31 NOTE — TELEPHONE ENCOUNTER
----- Message from Pearl Spencer sent at 7/28/2023 12:35 PM CDT -----  The patient saw Dr. Hollins today. She needs a 3 month appointment. She wants a mid morning on a Wednesday pt's # [phone] GH

## 2023-07-31 NOTE — TELEPHONE ENCOUNTER
The only way this is going to be stat if I am correct is if she goes to the ER. She does not need an order if she goes to ER.

## 2023-07-31 NOTE — TELEPHONE ENCOUNTER
Appt scheduled for Wed 11/8/23 at 9:40am.    Patient wants to know if we can change her leg u/s order to stat because they scheduled her for August 9th.     Order pended for stat.

## 2023-08-03 ENCOUNTER — HOSPITAL ENCOUNTER (OUTPATIENT)
Dept: RADIOLOGY | Facility: HOSPITAL | Age: 68
Discharge: HOME | End: 2023-08-03
Attending: FAMILY MEDICINE
Payer: MEDICARE

## 2023-08-03 DIAGNOSIS — M79.604 PAIN OF RIGHT LOWER EXTREMITY: ICD-10-CM

## 2023-08-03 PROCEDURE — 93971 EXTREMITY STUDY: CPT | Mod: TC,PO,RT

## 2023-08-07 ENCOUNTER — PATIENT MESSAGE (OUTPATIENT)
Dept: FAMILY MEDICINE | Facility: CLINIC | Age: 68
End: 2023-08-07

## 2023-08-07 DIAGNOSIS — M79.604 PAIN OF RIGHT LOWER EXTREMITY: Primary | ICD-10-CM

## 2023-08-07 NOTE — TELEPHONE ENCOUNTER
I think she needs to see Ortho to make sure she hasnt pulled a muscle or something. I also want to do a test to check the arteries in her legs.

## 2023-08-08 ENCOUNTER — PATIENT MESSAGE (OUTPATIENT)
Dept: FAMILY MEDICINE | Facility: CLINIC | Age: 68
End: 2023-08-08

## 2023-09-07 ENCOUNTER — OFFICE VISIT (OUTPATIENT)
Dept: ORTHOPEDICS | Facility: CLINIC | Age: 68
End: 2023-09-07
Payer: MEDICARE

## 2023-09-07 VITALS
HEIGHT: 65 IN | WEIGHT: 233 LBS | DIASTOLIC BLOOD PRESSURE: 80 MMHG | SYSTOLIC BLOOD PRESSURE: 130 MMHG | BODY MASS INDEX: 38.82 KG/M2

## 2023-09-07 DIAGNOSIS — M76.821 TIBIAL TENDINITIS, POSTERIOR, RIGHT: ICD-10-CM

## 2023-09-07 DIAGNOSIS — M17.11 PRIMARY OSTEOARTHRITIS OF RIGHT KNEE: Primary | ICD-10-CM

## 2023-09-07 PROCEDURE — 3079F PR MOST RECENT DIASTOLIC BLOOD PRESSURE 80-89 MM HG: ICD-10-PCS | Mod: CPTII,S$GLB,, | Performed by: ORTHOPAEDIC SURGERY

## 2023-09-07 PROCEDURE — 3008F PR BODY MASS INDEX (BMI) DOCUMENTED: ICD-10-PCS | Mod: CPTII,S$GLB,, | Performed by: ORTHOPAEDIC SURGERY

## 2023-09-07 PROCEDURE — 3075F PR MOST RECENT SYSTOLIC BLOOD PRESS GE 130-139MM HG: ICD-10-PCS | Mod: CPTII,S$GLB,, | Performed by: ORTHOPAEDIC SURGERY

## 2023-09-07 PROCEDURE — 3288F PR FALLS RISK ASSESSMENT DOCUMENTED: ICD-10-PCS | Mod: CPTII,S$GLB,, | Performed by: ORTHOPAEDIC SURGERY

## 2023-09-07 PROCEDURE — 3288F FALL RISK ASSESSMENT DOCD: CPT | Mod: CPTII,S$GLB,, | Performed by: ORTHOPAEDIC SURGERY

## 2023-09-07 PROCEDURE — 99203 OFFICE O/P NEW LOW 30 MIN: CPT | Mod: S$GLB,,, | Performed by: ORTHOPAEDIC SURGERY

## 2023-09-07 PROCEDURE — 1101F PR PT FALLS ASSESS DOC 0-1 FALLS W/OUT INJ PAST YR: ICD-10-PCS | Mod: CPTII,S$GLB,, | Performed by: ORTHOPAEDIC SURGERY

## 2023-09-07 PROCEDURE — 1125F PR PAIN SEVERITY QUANTIFIED, PAIN PRESENT: ICD-10-PCS | Mod: CPTII,S$GLB,, | Performed by: ORTHOPAEDIC SURGERY

## 2023-09-07 PROCEDURE — 4010F ACE/ARB THERAPY RXD/TAKEN: CPT | Mod: CPTII,S$GLB,, | Performed by: ORTHOPAEDIC SURGERY

## 2023-09-07 PROCEDURE — 3079F DIAST BP 80-89 MM HG: CPT | Mod: CPTII,S$GLB,, | Performed by: ORTHOPAEDIC SURGERY

## 2023-09-07 PROCEDURE — 3066F NEPHROPATHY DOC TX: CPT | Mod: CPTII,S$GLB,, | Performed by: ORTHOPAEDIC SURGERY

## 2023-09-07 PROCEDURE — 1159F MED LIST DOCD IN RCRD: CPT | Mod: CPTII,S$GLB,, | Performed by: ORTHOPAEDIC SURGERY

## 2023-09-07 PROCEDURE — 1160F RVW MEDS BY RX/DR IN RCRD: CPT | Mod: CPTII,S$GLB,, | Performed by: ORTHOPAEDIC SURGERY

## 2023-09-07 PROCEDURE — 3061F PR NEG MICROALBUMINURIA RESULT DOCUMENTED/REVIEW: ICD-10-PCS | Mod: CPTII,S$GLB,, | Performed by: ORTHOPAEDIC SURGERY

## 2023-09-07 PROCEDURE — 3075F SYST BP GE 130 - 139MM HG: CPT | Mod: CPTII,S$GLB,, | Performed by: ORTHOPAEDIC SURGERY

## 2023-09-07 PROCEDURE — 1101F PT FALLS ASSESS-DOCD LE1/YR: CPT | Mod: CPTII,S$GLB,, | Performed by: ORTHOPAEDIC SURGERY

## 2023-09-07 PROCEDURE — 3008F BODY MASS INDEX DOCD: CPT | Mod: CPTII,S$GLB,, | Performed by: ORTHOPAEDIC SURGERY

## 2023-09-07 PROCEDURE — 3044F PR MOST RECENT HEMOGLOBIN A1C LEVEL <7.0%: ICD-10-PCS | Mod: CPTII,S$GLB,, | Performed by: ORTHOPAEDIC SURGERY

## 2023-09-07 PROCEDURE — 99203 PR OFFICE/OUTPT VISIT, NEW, LEVL III, 30-44 MIN: ICD-10-PCS | Mod: S$GLB,,, | Performed by: ORTHOPAEDIC SURGERY

## 2023-09-07 PROCEDURE — 1125F AMNT PAIN NOTED PAIN PRSNT: CPT | Mod: CPTII,S$GLB,, | Performed by: ORTHOPAEDIC SURGERY

## 2023-09-07 PROCEDURE — 4010F PR ACE/ARB THEARPY RXD/TAKEN: ICD-10-PCS | Mod: CPTII,S$GLB,, | Performed by: ORTHOPAEDIC SURGERY

## 2023-09-07 PROCEDURE — 3044F HG A1C LEVEL LT 7.0%: CPT | Mod: CPTII,S$GLB,, | Performed by: ORTHOPAEDIC SURGERY

## 2023-09-07 PROCEDURE — 3066F PR DOCUMENTATION OF TREATMENT FOR NEPHROPATHY: ICD-10-PCS | Mod: CPTII,S$GLB,, | Performed by: ORTHOPAEDIC SURGERY

## 2023-09-07 PROCEDURE — 3061F NEG MICROALBUMINURIA REV: CPT | Mod: CPTII,S$GLB,, | Performed by: ORTHOPAEDIC SURGERY

## 2023-09-07 PROCEDURE — 1159F PR MEDICATION LIST DOCUMENTED IN MEDICAL RECORD: ICD-10-PCS | Mod: CPTII,S$GLB,, | Performed by: ORTHOPAEDIC SURGERY

## 2023-09-07 PROCEDURE — 1160F PR REVIEW ALL MEDS BY PRESCRIBER/CLIN PHARMACIST DOCUMENTED: ICD-10-PCS | Mod: CPTII,S$GLB,, | Performed by: ORTHOPAEDIC SURGERY

## 2023-09-07 NOTE — PROGRESS NOTES
[facility]    Subjective:     Chief Complaint:   Chief Complaint   Patient presents with    Right Lower Leg - Pain     RT calf pain for a few weeks, no injury known but she had a sudden jolt of pain one night when trying to get out of bed. Pain radiates up from foot into thigh. Pain is every day, she also gets pins/needles sensation       Past Medical History:   Diagnosis Date    Diabetes mellitus     Hypertension     Mental disorder     Anxiety       Past Surgical History:   Procedure Laterality Date     SECTION      x3    COSMETIC SURGERY      Scar on right side of face       Current Outpatient Medications   Medication Sig    ALPRAZolam (XANAX) 0.5 MG tablet Take 1 tablet (0.5 mg total) by mouth 2 (two) times daily.    amLODIPine (NORVASC) 2.5 MG tablet Take 1 tablet (2.5 mg total) by mouth once daily.    ascorbic acid, vitamin C, (VITAMIN C) 500 MG tablet Take 500 mg by mouth once daily.    aspirin (ECOTRIN) 81 MG EC tablet Take 1 tablet (81 mg total) by mouth once daily.    blood sugar diagnostic Strp To check BG two times daily, to use with insurance preferred meter    blood-glucose meter kit To check BG two times daily, to use with insurance preferred meter    clotrimazole-betamethasone 1-0.05% (LOTRISONE) cream Apply topically 2 (two) times daily.    cyanocobalamin 2000 MCG tablet Take 2,000 mcg by mouth once daily.    glimepiride (AMARYL) 2 MG tablet Take 1 tablet (2 mg total) by mouth 2 (two) times a day.    lancets Misc To check BG two times daily, to use with insurance preferred meter    losartan (COZAAR) 100 MG tablet Take 1 tablet (100 mg total) by mouth once daily.    metFORMIN (GLUCOPHAGE-XR) 500 MG ER 24hr tablet Take 1 tablet (500 mg total) by mouth 2 (two) times daily with meals.    multivitamin capsule Take 1 capsule by mouth once daily.    vitamin D (VITAMIN D3) 1000 units Tab Take 5,000 Units by mouth once daily.    zinc gluconate 50 mg tablet Take 50 mg by mouth once daily.      No current facility-administered medications for this visit.       Review of patient's allergies indicates:   Allergen Reactions    Penicillins      Other reaction(s): Vomiting  Other reaction(s): Swelling  Other reaction(s): Nausea  Other reaction(s): Hives       Family History   Problem Relation Age of Onset    Diabetes Maternal Grandmother     Hypertension Maternal Grandmother     Stroke Maternal Grandmother     Diabetes Father     Hypertension Father     Hemochromatosis Mother     Hypertension Mother     Stroke Mother     Breast cancer Paternal Aunt     Cancer Neg Hx     Colon cancer Neg Hx     Ovarian cancer Neg Hx     Eclampsia Neg Hx     Miscarriages / Stillbirths Neg Hx      labor Neg Hx        Social History     Socioeconomic History    Marital status:    Tobacco Use    Smoking status: Former     Current packs/day: 0.00     Average packs/day: 0.5 packs/day for 11.0 years (5.5 ttl pk-yrs)     Types: Cigarettes     Start date:      Quit date:      Years since quittin.6     Passive exposure: Never    Smokeless tobacco: Never   Substance and Sexual Activity    Alcohol use: No    Drug use: No    Sexual activity: Not Currently       History of present illness:  67-year-old female, presents to clinic today for evaluation of complaints of right lower extremity pain.  She had an acute onset of right lower extremity pain about 6 weeks ago when she jumped out of the bed in the middle of the night.  She had pain from the right foot radiating up the right lower leg.  This is primarily all anterior right foot and ankle pain.  She did not fall.  Foot ankle do not feel unstable, she is had some chronic right knee pain, she has some mild arthritis in the right knee previously documented on x-ray.  She followed up with primary care they did an ultrasound which was negative for DVT.  She has pain everyday and I believe from her history objectively her pain has improved but subjectively she still  complains of significant right lower extremity pain.      Review of Systems:    Constitution: Negative for chills, fever, and sweats.  Negative for unexplained weight loss.    HENT:  Negative for headaches and blurry vision.    Cardiovascular:Negative for chest pain or irregular heart beat. Negative for hypertension.    Respiratory:  Negative for cough and shortness of breath.    Gastrointestinal: Negative for abdominal pain, heartburn, melena, nausea, and vomitting.    Genitourinary:  Negative bladder incontinence and dysuria.    Musculoskeletal:  See HPI for details.     Neurological: Negative for numbness.    Psychiatric/Behavioral: Negative for depression.  The patient is not nervous/anxious.      Endocrine: Negative for polyuria    Hematologic/Lymphatic: Negative for bleeding problem.  Does not bruise/bleed easily.    Skin: Negative for poor would healing and rash    Objective:      Physical Examination:    Vital Signs:    Vitals:    09/07/23 0853   BP: 130/80       Body mass index is 39.38 kg/m².    This a well-developed, well nourished patient in no acute distress.  They are alert and oriented and cooperative to examination.        Examination of the right lower extremity, the skin is dry and intact, no erythema or ecchymosis no signs and symptoms of infection.  There is no effusion in the knee or the ankle.  She has normal range of motion of the right knee she can extend to 0, she can flex to 120°.  The knee feels stable in both anterior-posterior as well as varus and valgus stresses.  Lachman's test was negative.  Neeru's test was negative.  Abel's test was positive.  The Achilles felt intact.  Nontender over the malleoli.  No pain in the midfoot.  She is able to fully bear weight, she stands erect, ambulates with a nonantalgic gait.    Pertinent New Results:    XRAY Report / Interpretation:   AP lateral sunrise views of the right knee taken today in the office demonstrate medial collapse and medial  "femoral condyle spurring.  Mild-to-moderate osteoarthritic changes in the right knee.    AP lateral oblique views of the right ankle taken today in the office do not show any significant osteoarthritis or other bony abnormalities.  No fracture dislocation.  Visualized soft tissues appear normal.    AP lateral oblique views of the right foot taken today office also do not show any obvious osseous abnormalities.  No evidence of fracture dislocation.  Visualized soft tissues appear normal.    Assessment/Plan:      Right foot leg pain.  More than likely this is a myofascial or tendinous injury.  She does have some loss of the arch in her foot, she is wearing some unsupportive shoes.  Potentially some posterior tibial tendinitis.  We just recommended observation at this point.  We will do some physical therapy.  We will check her back in a month.  If not better, consider an MRI.    Howard De Leon, Physician Assistant, served in the capacity as a "scribe" for this patient encounter.  A "face-to-face" encounter occurred with Dr. Angel Coleman on this date.  The treatment plan and medical decision-making is outlined above. Patient was seen and examined with a chaperone.       This note was created using Dragon voice recognition software that occasionally misinterpreted phrases or words.          "

## 2023-09-07 NOTE — LETTER
September 7, 2023      [facility]  [address]  [place] [address]  Phone: [phone]  Fax: [phone]       Patient: Della Juarez   YOB: 1955  Date of Visit: 09/07/2023    To Whom It May Concern:    Anna Juarez  was at my office on 09/07/2023. The patient may return to work/school on 09/11/23 with no restrictions. If you have any questions or concerns, or if I can be of further assistance, please do not hesitate to contact me.    Sincerely,      Angel Coleman MD

## 2023-09-07 NOTE — LETTER
September 7, 2023      [facility]  [address]  [place] [address]  Phone: [phone]  Fax: [phone]       Patient: Della Juarez   YOB: 1955  Date of Visit: 09/07/2023    To Whom It May Concern:    Della Juarez, was seen in my office today 9/7/2023. The patient may return to work 9/11/2023. She has been referred to physical therapy 2-3 times per week x 6 weeks. Please contact my office should you have any further questions.    Sincerely,      Angel Coleman MD

## 2023-09-20 ENCOUNTER — TELEPHONE (OUTPATIENT)
Dept: FAMILY MEDICINE | Facility: CLINIC | Age: 68
End: 2023-09-20

## 2023-09-20 NOTE — TELEPHONE ENCOUNTER
----- Message from Marietta Hernandez sent at 9/20/2023  3:51 PM CDT -----  Patient called and stated that she need to rescheduled her appointment for the end of October and she is unable to make that date. Please give her a call at [phone]

## 2023-10-05 ENCOUNTER — OFFICE VISIT (OUTPATIENT)
Dept: ORTHOPEDICS | Facility: CLINIC | Age: 68
End: 2023-10-05
Payer: MEDICARE

## 2023-10-05 VITALS — BODY MASS INDEX: 38.82 KG/M2 | HEIGHT: 65 IN | WEIGHT: 233 LBS

## 2023-10-05 DIAGNOSIS — M76.821 TIBIAL TENDINITIS, POSTERIOR, RIGHT: ICD-10-CM

## 2023-10-05 DIAGNOSIS — M17.11 PRIMARY OSTEOARTHRITIS OF RIGHT KNEE: Primary | ICD-10-CM

## 2023-10-05 PROCEDURE — 3288F PR FALLS RISK ASSESSMENT DOCUMENTED: ICD-10-PCS | Mod: CPTII,S$GLB,, | Performed by: ORTHOPAEDIC SURGERY

## 2023-10-05 PROCEDURE — 3061F NEG MICROALBUMINURIA REV: CPT | Mod: CPTII,S$GLB,, | Performed by: ORTHOPAEDIC SURGERY

## 2023-10-05 PROCEDURE — 1160F PR REVIEW ALL MEDS BY PRESCRIBER/CLIN PHARMACIST DOCUMENTED: ICD-10-PCS | Mod: CPTII,S$GLB,, | Performed by: ORTHOPAEDIC SURGERY

## 2023-10-05 PROCEDURE — 3008F BODY MASS INDEX DOCD: CPT | Mod: CPTII,S$GLB,, | Performed by: ORTHOPAEDIC SURGERY

## 2023-10-05 PROCEDURE — 1125F PR PAIN SEVERITY QUANTIFIED, PAIN PRESENT: ICD-10-PCS | Mod: CPTII,S$GLB,, | Performed by: ORTHOPAEDIC SURGERY

## 2023-10-05 PROCEDURE — 99213 OFFICE O/P EST LOW 20 MIN: CPT | Mod: S$GLB,,, | Performed by: ORTHOPAEDIC SURGERY

## 2023-10-05 PROCEDURE — 99213 PR OFFICE/OUTPT VISIT, EST, LEVL III, 20-29 MIN: ICD-10-PCS | Mod: S$GLB,,, | Performed by: ORTHOPAEDIC SURGERY

## 2023-10-05 PROCEDURE — 1159F PR MEDICATION LIST DOCUMENTED IN MEDICAL RECORD: ICD-10-PCS | Mod: CPTII,S$GLB,, | Performed by: ORTHOPAEDIC SURGERY

## 2023-10-05 PROCEDURE — 4010F ACE/ARB THERAPY RXD/TAKEN: CPT | Mod: CPTII,S$GLB,, | Performed by: ORTHOPAEDIC SURGERY

## 2023-10-05 PROCEDURE — 1159F MED LIST DOCD IN RCRD: CPT | Mod: CPTII,S$GLB,, | Performed by: ORTHOPAEDIC SURGERY

## 2023-10-05 PROCEDURE — 4010F PR ACE/ARB THEARPY RXD/TAKEN: ICD-10-PCS | Mod: CPTII,S$GLB,, | Performed by: ORTHOPAEDIC SURGERY

## 2023-10-05 PROCEDURE — 1101F PR PT FALLS ASSESS DOC 0-1 FALLS W/OUT INJ PAST YR: ICD-10-PCS | Mod: CPTII,S$GLB,, | Performed by: ORTHOPAEDIC SURGERY

## 2023-10-05 PROCEDURE — 3008F PR BODY MASS INDEX (BMI) DOCUMENTED: ICD-10-PCS | Mod: CPTII,S$GLB,, | Performed by: ORTHOPAEDIC SURGERY

## 2023-10-05 PROCEDURE — 3061F PR NEG MICROALBUMINURIA RESULT DOCUMENTED/REVIEW: ICD-10-PCS | Mod: CPTII,S$GLB,, | Performed by: ORTHOPAEDIC SURGERY

## 2023-10-05 PROCEDURE — 3044F HG A1C LEVEL LT 7.0%: CPT | Mod: CPTII,S$GLB,, | Performed by: ORTHOPAEDIC SURGERY

## 2023-10-05 PROCEDURE — 3044F PR MOST RECENT HEMOGLOBIN A1C LEVEL <7.0%: ICD-10-PCS | Mod: CPTII,S$GLB,, | Performed by: ORTHOPAEDIC SURGERY

## 2023-10-05 PROCEDURE — 3288F FALL RISK ASSESSMENT DOCD: CPT | Mod: CPTII,S$GLB,, | Performed by: ORTHOPAEDIC SURGERY

## 2023-10-05 PROCEDURE — 1101F PT FALLS ASSESS-DOCD LE1/YR: CPT | Mod: CPTII,S$GLB,, | Performed by: ORTHOPAEDIC SURGERY

## 2023-10-05 PROCEDURE — 1160F RVW MEDS BY RX/DR IN RCRD: CPT | Mod: CPTII,S$GLB,, | Performed by: ORTHOPAEDIC SURGERY

## 2023-10-05 PROCEDURE — 1125F AMNT PAIN NOTED PAIN PRSNT: CPT | Mod: CPTII,S$GLB,, | Performed by: ORTHOPAEDIC SURGERY

## 2023-10-05 PROCEDURE — 3066F NEPHROPATHY DOC TX: CPT | Mod: CPTII,S$GLB,, | Performed by: ORTHOPAEDIC SURGERY

## 2023-10-05 PROCEDURE — 3066F PR DOCUMENTATION OF TREATMENT FOR NEPHROPATHY: ICD-10-PCS | Mod: CPTII,S$GLB,, | Performed by: ORTHOPAEDIC SURGERY

## 2023-10-05 NOTE — LETTER
October 5, 2023      [facility]  [address]  [place] [address]  Phone: [phone]  Fax: [phone]       Patient: Della Juarez   YOB: 1955  Date of Visit: 10/05/2023    To Whom It May Concern:    Della Juarez, was seen in my office today 10/05/2023. The patient may return to work 10/08/2023. She has been referred to continue physical therapy 2-3 times per week x 6 weeks. Please contact my office should you have any further questions.  Sincerely,    Sincerely,         Angel Coleman MD

## 2023-10-05 NOTE — PROGRESS NOTES
[facility]    Subjective:     Chief Complaint:   Chief Complaint   Patient presents with    Right Lower Leg - Pain     Right tibial tendonitis follow up. Has been going to PT. States she is doing much better       Past Medical History:   Diagnosis Date    Diabetes mellitus     Hypertension     Mental disorder     Anxiety       Past Surgical History:   Procedure Laterality Date     SECTION      x3    COSMETIC SURGERY      Scar on right side of face       Current Outpatient Medications   Medication Sig    ALPRAZolam (XANAX) 0.5 MG tablet Take 1 tablet (0.5 mg total) by mouth 2 (two) times daily.    amLODIPine (NORVASC) 2.5 MG tablet Take 1 tablet (2.5 mg total) by mouth once daily.    ascorbic acid, vitamin C, (VITAMIN C) 500 MG tablet Take 500 mg by mouth once daily.    aspirin (ECOTRIN) 81 MG EC tablet Take 1 tablet (81 mg total) by mouth once daily.    blood sugar diagnostic Strp To check BG two times daily, to use with insurance preferred meter    blood-glucose meter kit To check BG two times daily, to use with insurance preferred meter    clotrimazole-betamethasone 1-0.05% (LOTRISONE) cream Apply topically 2 (two) times daily.    cyanocobalamin 2000 MCG tablet Take 2,000 mcg by mouth once daily.    glimepiride (AMARYL) 2 MG tablet Take 1 tablet (2 mg total) by mouth 2 (two) times a day.    lancets Misc To check BG two times daily, to use with insurance preferred meter    losartan (COZAAR) 100 MG tablet Take 1 tablet (100 mg total) by mouth once daily.    metFORMIN (GLUCOPHAGE-XR) 500 MG ER 24hr tablet Take 1 tablet (500 mg total) by mouth 2 (two) times daily with meals.    multivitamin capsule Take 1 capsule by mouth once daily.    vitamin D (VITAMIN D3) 1000 units Tab Take 5,000 Units by mouth once daily.    zinc gluconate 50 mg tablet Take 50 mg by mouth once daily.     No current facility-administered medications for this visit.       Review of patient's allergies indicates:   Allergen  Reactions    Amlodipine      Other reaction(s): Not available    Penicillins      Other reaction(s): Vomiting  Other reaction(s): Swelling  Other reaction(s): Nausea  Other reaction(s): Hives       Family History   Problem Relation Age of Onset    Diabetes Maternal Grandmother     Hypertension Maternal Grandmother     Stroke Maternal Grandmother     Diabetes Father     Hypertension Father     Hemochromatosis Mother     Hypertension Mother     Stroke Mother     Breast cancer Paternal Aunt     Cancer Neg Hx     Colon cancer Neg Hx     Ovarian cancer Neg Hx     Eclampsia Neg Hx     Miscarriages / Stillbirths Neg Hx      labor Neg Hx        Social History     Socioeconomic History    Marital status:    Tobacco Use    Smoking status: Former     Current packs/day: 0.00     Average packs/day: 0.5 packs/day for 11.0 years (5.5 ttl pk-yrs)     Types: Cigarettes     Start date:      Quit date:      Years since quittin.7     Passive exposure: Never    Smokeless tobacco: Never   Substance and Sexual Activity    Alcohol use: No    Drug use: No    Sexual activity: Not Currently       History of present illness: 67-year-old female, presents to clinic today for re-evaluation of complaints of right lower extremity pain.  She is been doing physical therapy, she is feeling much better.  We saw her earlier in September, she had an acute onset of right lower extremity pain about 6 weeks ago when she jumped out of the bed in the middle of the night.  She had pain from the right foot radiating up the right lower leg.  This is primarily all anterior right foot and ankle pain.  She did not fall.  Foot ankle do not feel unstable, she is had some chronic right knee pain, she has some mild arthritis in the right knee previously documented on x-ray.  She followed up with primary care they did an ultrasound which was negative for DVT.  She has pain everyday and I believe from her history objectively her pain has  improved but subjectively she still complains of significant right lower extremity pain.      Review of Systems:    Constitution: Negative for chills, fever, and sweats.  Negative for unexplained weight loss.    HENT:  Negative for headaches and blurry vision.    Cardiovascular:Negative for chest pain or irregular heart beat. Negative for hypertension.    Respiratory:  Negative for cough and shortness of breath.    Gastrointestinal: Negative for abdominal pain, heartburn, melena, nausea, and vomitting.    Genitourinary:  Negative bladder incontinence and dysuria.    Musculoskeletal:  See HPI for details.     Neurological: Negative for numbness.    Psychiatric/Behavioral: Negative for depression.  The patient is not nervous/anxious.      Endocrine: Negative for polyuria    Hematologic/Lymphatic: Negative for bleeding problem.  Does not bruise/bleed easily.    Skin: Negative for poor would healing and rash    Objective:      Physical Examination:    Vital Signs:  There were no vitals filed for this visit.    Body mass index is 39.38 kg/m².    This a well-developed, well nourished patient in no acute distress.  They are alert and oriented and cooperative to examination.        Examination of the right lower extremity, the skin is dry and intact, no erythema or ecchymosis no signs and symptoms of infection.  There is no effusion in the knee or the ankle.  She has normal range of motion of the right knee she can extend to 0, she can flex to 120°.  The knee feels stable in both anterior-posterior as well as varus and valgus stresses.  Lachman's test was negative.  Neeru's test was negative.  Abel's test was positive.  The Achilles felt intact.  Nontender over the malleoli.  No pain in the midfoot.  She is able to fully bear weight, she stands erect, ambulates with a nonantalgic gait.  Pertinent New Results:    XRAY Report / Interpretation:       Assessment/Plan:      Posterior tibial tendinitis, responding to  "physical therapy.  Continue physical therapy, will refer her for further workup and evaluation with Podiatry if she does not improve.    Howard De Leon, Physician Assistant, served in the capacity as a "scribe" for this patient encounter.  A "face-to-face" encounter occurred with Dr. Angel Coleman on this date.  The treatment plan and medical decision-making is outlined above. Patient was seen and examined with a chaperone.       This note was created using Dragon voice recognition software that occasionally misinterpreted phrases or words.        "

## 2023-10-05 NOTE — LETTER
October 5, 2023      [facility]  [address]  [place] [address]  Phone: [phone]  Fax: [phone]       Patient: Della Juarez   YOB: 1955  Date of Visit: 10/05/2023    To Whom It May Concern:    Della Juarez, was seen in my office today 10/05/2023. The patient may return to work 10/06/2023. She has been referred to continue physical therapy 2-3 times per week x 6 weeks. Please contact my office should you have any further questions.  Sincerely,           Angel Coleman MD

## 2023-11-01 ENCOUNTER — OFFICE VISIT (OUTPATIENT)
Dept: FAMILY MEDICINE | Facility: CLINIC | Age: 68
End: 2023-11-01
Payer: MEDICARE

## 2023-11-01 ENCOUNTER — TELEPHONE (OUTPATIENT)
Dept: FAMILY MEDICINE | Facility: CLINIC | Age: 68
End: 2023-11-01

## 2023-11-01 VITALS
HEIGHT: 65 IN | OXYGEN SATURATION: 97 % | HEART RATE: 85 BPM | BODY MASS INDEX: 40.18 KG/M2 | WEIGHT: 241.19 LBS | SYSTOLIC BLOOD PRESSURE: 158 MMHG | DIASTOLIC BLOOD PRESSURE: 100 MMHG

## 2023-11-01 DIAGNOSIS — F41.1 GAD (GENERALIZED ANXIETY DISORDER): ICD-10-CM

## 2023-11-01 DIAGNOSIS — Z79.899 LONG-TERM USE OF HIGH-RISK MEDICATION: ICD-10-CM

## 2023-11-01 DIAGNOSIS — E11.9 TYPE 2 DIABETES MELLITUS WITHOUT COMPLICATION, WITHOUT LONG-TERM CURRENT USE OF INSULIN: Primary | ICD-10-CM

## 2023-11-01 DIAGNOSIS — R05.1 ACUTE COUGH: ICD-10-CM

## 2023-11-01 DIAGNOSIS — F43.21 GRIEVING: ICD-10-CM

## 2023-11-01 DIAGNOSIS — Z12.31 SCREENING MAMMOGRAM FOR HIGH-RISK PATIENT: ICD-10-CM

## 2023-11-01 DIAGNOSIS — K21.9 GASTROESOPHAGEAL REFLUX DISEASE WITHOUT ESOPHAGITIS: ICD-10-CM

## 2023-11-01 DIAGNOSIS — Z87.891 FORMER SMOKER: ICD-10-CM

## 2023-11-01 DIAGNOSIS — I10 PRIMARY HYPERTENSION: ICD-10-CM

## 2023-11-01 LAB — HBA1C MFR BLD: 6.6 %

## 2023-11-01 PROCEDURE — 3044F HG A1C LEVEL LT 7.0%: CPT | Mod: CPTII,S$GLB,, | Performed by: FAMILY MEDICINE

## 2023-11-01 PROCEDURE — 4010F PR ACE/ARB THEARPY RXD/TAKEN: ICD-10-PCS | Mod: CPTII,S$GLB,, | Performed by: FAMILY MEDICINE

## 2023-11-01 PROCEDURE — 3061F NEG MICROALBUMINURIA REV: CPT | Mod: CPTII,S$GLB,, | Performed by: FAMILY MEDICINE

## 2023-11-01 PROCEDURE — 3080F PR MOST RECENT DIASTOLIC BLOOD PRESSURE >= 90 MM HG: ICD-10-PCS | Mod: CPTII,S$GLB,, | Performed by: FAMILY MEDICINE

## 2023-11-01 PROCEDURE — 3008F PR BODY MASS INDEX (BMI) DOCUMENTED: ICD-10-PCS | Mod: CPTII,S$GLB,, | Performed by: FAMILY MEDICINE

## 2023-11-01 PROCEDURE — 1101F PT FALLS ASSESS-DOCD LE1/YR: CPT | Mod: CPTII,S$GLB,, | Performed by: FAMILY MEDICINE

## 2023-11-01 PROCEDURE — 3008F BODY MASS INDEX DOCD: CPT | Mod: CPTII,S$GLB,, | Performed by: FAMILY MEDICINE

## 2023-11-01 PROCEDURE — 1159F MED LIST DOCD IN RCRD: CPT | Mod: CPTII,S$GLB,, | Performed by: FAMILY MEDICINE

## 2023-11-01 PROCEDURE — 4010F ACE/ARB THERAPY RXD/TAKEN: CPT | Mod: CPTII,S$GLB,, | Performed by: FAMILY MEDICINE

## 2023-11-01 PROCEDURE — 3077F PR MOST RECENT SYSTOLIC BLOOD PRESSURE >= 140 MM HG: ICD-10-PCS | Mod: CPTII,S$GLB,, | Performed by: FAMILY MEDICINE

## 2023-11-01 PROCEDURE — 3080F DIAST BP >= 90 MM HG: CPT | Mod: CPTII,S$GLB,, | Performed by: FAMILY MEDICINE

## 2023-11-01 PROCEDURE — 99214 OFFICE O/P EST MOD 30 MIN: CPT | Mod: S$GLB,,, | Performed by: FAMILY MEDICINE

## 2023-11-01 PROCEDURE — 3061F PR NEG MICROALBUMINURIA RESULT DOCUMENTED/REVIEW: ICD-10-PCS | Mod: CPTII,S$GLB,, | Performed by: FAMILY MEDICINE

## 2023-11-01 PROCEDURE — 1160F RVW MEDS BY RX/DR IN RCRD: CPT | Mod: CPTII,S$GLB,, | Performed by: FAMILY MEDICINE

## 2023-11-01 PROCEDURE — 3044F PR MOST RECENT HEMOGLOBIN A1C LEVEL <7.0%: ICD-10-PCS | Mod: CPTII,S$GLB,, | Performed by: FAMILY MEDICINE

## 2023-11-01 PROCEDURE — 1101F PR PT FALLS ASSESS DOC 0-1 FALLS W/OUT INJ PAST YR: ICD-10-PCS | Mod: CPTII,S$GLB,, | Performed by: FAMILY MEDICINE

## 2023-11-01 PROCEDURE — 1160F PR REVIEW ALL MEDS BY PRESCRIBER/CLIN PHARMACIST DOCUMENTED: ICD-10-PCS | Mod: CPTII,S$GLB,, | Performed by: FAMILY MEDICINE

## 2023-11-01 PROCEDURE — 3288F PR FALLS RISK ASSESSMENT DOCUMENTED: ICD-10-PCS | Mod: CPTII,S$GLB,, | Performed by: FAMILY MEDICINE

## 2023-11-01 PROCEDURE — 83036 POCT HEMOGLOBIN A1C: ICD-10-PCS | Mod: QW,,, | Performed by: FAMILY MEDICINE

## 2023-11-01 PROCEDURE — 1159F PR MEDICATION LIST DOCUMENTED IN MEDICAL RECORD: ICD-10-PCS | Mod: CPTII,S$GLB,, | Performed by: FAMILY MEDICINE

## 2023-11-01 PROCEDURE — 3077F SYST BP >= 140 MM HG: CPT | Mod: CPTII,S$GLB,, | Performed by: FAMILY MEDICINE

## 2023-11-01 PROCEDURE — 3288F FALL RISK ASSESSMENT DOCD: CPT | Mod: CPTII,S$GLB,, | Performed by: FAMILY MEDICINE

## 2023-11-01 PROCEDURE — 83036 HEMOGLOBIN GLYCOSYLATED A1C: CPT | Mod: QW,,, | Performed by: FAMILY MEDICINE

## 2023-11-01 PROCEDURE — 3066F NEPHROPATHY DOC TX: CPT | Mod: CPTII,S$GLB,, | Performed by: FAMILY MEDICINE

## 2023-11-01 PROCEDURE — 99214 PR OFFICE/OUTPT VISIT, EST, LEVL IV, 30-39 MIN: ICD-10-PCS | Mod: S$GLB,,, | Performed by: FAMILY MEDICINE

## 2023-11-01 PROCEDURE — 3066F PR DOCUMENTATION OF TREATMENT FOR NEPHROPATHY: ICD-10-PCS | Mod: CPTII,S$GLB,, | Performed by: FAMILY MEDICINE

## 2023-11-01 RX ORDER — ALBUTEROL SULFATE 90 UG/1
2 AEROSOL, METERED RESPIRATORY (INHALATION) EVERY 6 HOURS PRN
Qty: 8 G | Refills: 0 | Status: SHIPPED | OUTPATIENT
Start: 2023-11-01 | End: 2024-02-01

## 2023-11-01 NOTE — TELEPHONE ENCOUNTER
----- Message from Mary Barr sent at 11/1/2023  5:15 PM CDT -----  Pt needs to schedule a 3 month f/u.  [phone]

## 2023-11-01 NOTE — PROGRESS NOTES
SUBJECTIVE:    Patient ID: Della Juarez is a 67 y.o. female.    Chief Complaint: Follow-up (No bottles//Pt here for 3mo follow up//discuss cough xfew weeks//declines flu vacc//would like order for mammo//need foot exam//JL)      Pt here to checkup on acute and chronic conditions.    A1c is 6.6%.  Working 2 jobs at the time. Has gained 8 pounds since last visit.    BP is elevated today. Has been grieving due to 2 deaths in the family in the last week. Has been crying all the time. Has been busy trying to get things together for one  and travel for another. Took meds. Denies CP/AH/SOB. Weight is stable.    Has been doing PT for right leg that has been helping a lot. No longer swelling.  Stands all day at work. Takes ibuprofen as needed.     No recent labs.     Continues to have some anxiety, and takes xanax as needed.     Has been coughing for the last 3 weeks. Does have heartburn, and tends to eat late, will take tums. No hx asthma    --------------------------------------------------------  Mammogram nl, 10/2022.                      Decided not to do Cologuard due to insurance reasons. Will do cscope at some point.  Eye exams by (Vibha)  Pt not taking statin due to not wanting to in preference to working on diet and exercise.            Office Visit on 2023   Component Date Value Ref Range Status    Hemoglobin A1C, POC 2023 6.6  % Final       Past Medical History:   Diagnosis Date    Diabetes mellitus     Hypertension     Mental disorder     Anxiety     Social History     Socioeconomic History    Marital status:    Tobacco Use    Smoking status: Former     Current packs/day: 0.00     Average packs/day: 0.5 packs/day for 11.0 years (5.5 ttl pk-yrs)     Types: Cigarettes     Start date:      Quit date: 2016     Years since quittin.8     Passive exposure: Never    Smokeless tobacco: Never   Substance and Sexual Activity    Alcohol use: No    Drug use: No    Sexual activity: Not  Currently     Past Surgical History:   Procedure Laterality Date     SECTION      x3    COSMETIC SURGERY      Scar on right side of face     Family History   Problem Relation Age of Onset    Diabetes Maternal Grandmother     Hypertension Maternal Grandmother     Stroke Maternal Grandmother     Diabetes Father     Hypertension Father     Hemochromatosis Mother     Hypertension Mother     Stroke Mother     Breast cancer Paternal Aunt     Cancer Neg Hx     Colon cancer Neg Hx     Ovarian cancer Neg Hx     Eclampsia Neg Hx     Miscarriages / Stillbirths Neg Hx      labor Neg Hx        Review of patient's allergies indicates:   Allergen Reactions    Amlodipine      Other reaction(s): Not available    Penicillins      Other reaction(s): Vomiting  Other reaction(s): Swelling  Other reaction(s): Nausea  Other reaction(s): Hives       Current Outpatient Medications:     albuterol (VENTOLIN HFA) 90 mcg/actuation inhaler, Inhale 2 puffs into the lungs every 6 (six) hours as needed for Wheezing. Rescue, Disp: 8 g, Rfl: 0    ALPRAZolam (XANAX) 0.5 MG tablet, Take 1 tablet (0.5 mg total) by mouth 2 (two) times daily., Disp: 60 tablet, Rfl: 5    amLODIPine (NORVASC) 2.5 MG tablet, Take 1 tablet (2.5 mg total) by mouth once daily., Disp: 90 tablet, Rfl: 3    ascorbic acid, vitamin C, (VITAMIN C) 500 MG tablet, Take 500 mg by mouth once daily., Disp: , Rfl:     aspirin (ECOTRIN) 81 MG EC tablet, Take 1 tablet (81 mg total) by mouth once daily., Disp: , Rfl: 0    blood sugar diagnostic Strp, To check BG two times daily, to use with insurance preferred meter, Disp: 200 each, Rfl: 1    blood-glucose meter kit, To check BG two times daily, to use with insurance preferred meter, Disp: 1 each, Rfl: 0    clotrimazole-betamethasone 1-0.05% (LOTRISONE) cream, Apply topically 2 (two) times daily., Disp: 45 g, Rfl: 1    cyanocobalamin 2000 MCG tablet, Take 2,000 mcg by mouth once daily., Disp: , Rfl:     glimepiride (AMARYL) 2  "MG tablet, Take 1 tablet (2 mg total) by mouth 2 (two) times a day., Disp: 180 tablet, Rfl: 3    lancets Misc, To check BG two times daily, to use with insurance preferred meter, Disp: 200 each, Rfl: 1    losartan (COZAAR) 100 MG tablet, Take 1 tablet (100 mg total) by mouth once daily., Disp: 90 tablet, Rfl: 3    metFORMIN (GLUCOPHAGE-XR) 500 MG ER 24hr tablet, Take 1 tablet (500 mg total) by mouth 2 (two) times daily with meals., Disp: 180 tablet, Rfl: 3    multivitamin capsule, Take 1 capsule by mouth once daily., Disp: , Rfl:     vitamin D (VITAMIN D3) 1000 units Tab, Take 5,000 Units by mouth once daily., Disp: , Rfl:     zinc gluconate 50 mg tablet, Take 50 mg by mouth once daily., Disp: , Rfl:     Review of Systems   Constitutional:  Negative for appetite change, fatigue, fever and unexpected weight change.   Respiratory:  Positive for cough. Negative for chest tightness, shortness of breath and wheezing.    Cardiovascular:  Negative for chest pain and leg swelling.   Gastrointestinal:  Negative for abdominal pain, constipation, nausea and vomiting.        +heartburn   Genitourinary:  Negative for difficulty urinating, dysuria, frequency and urgency.   Musculoskeletal:  Negative for arthralgias, back pain, myalgias and neck pain.   Skin:  Negative for rash.   Neurological:  Negative for dizziness, weakness, numbness and headaches.   Hematological:  Does not bruise/bleed easily.   Psychiatric/Behavioral:  Negative for dysphoric mood, sleep disturbance and suicidal ideas. The patient is nervous/anxious.    All other systems reviewed and are negative.         Objective:      Vitals:    11/01/23 1610 11/01/23 1629   BP: (!) 158/98 (!) 158/100   Pulse: 85    SpO2: 97%    Weight: 109.4 kg (241 lb 3.2 oz)    Height: 5' 4.5" (1.638 m)      Wt Readings from Last 6 Encounters:   11/01/23 109.4 kg (241 lb 3.2 oz)   10/05/23 105.7 kg (233 lb)   09/07/23 105.7 kg (233 lb)   07/28/23 105.7 kg (233 lb)   03/29/23 106.1 kg " (234 lb)   01/11/23 101.7 kg (224 lb 3.2 oz)         Physical Exam  Vitals reviewed.   Constitutional:       General: She is not in acute distress.     Appearance: Normal appearance. She is well-developed.      Comments: Obese     HENT:      Head: Normocephalic and atraumatic.   Neck:      Thyroid: No thyromegaly.   Cardiovascular:      Rate and Rhythm: Normal rate and regular rhythm.      Heart sounds: Normal heart sounds. No murmur heard.     No friction rub.   Pulmonary:      Effort: Pulmonary effort is normal.      Breath sounds: Normal breath sounds. No wheezing or rales.   Abdominal:      General: Bowel sounds are normal. There is no distension.      Palpations: Abdomen is soft.      Tenderness: There is no abdominal tenderness.   Musculoskeletal:      Cervical back: Neck supple.      Right lower leg: No swelling. No edema.   Lymphadenopathy:      Cervical: No cervical adenopathy.   Skin:     General: Skin is warm and dry.      Findings: No rash.   Neurological:      Mental Status: She is alert and oriented to person, place, and time.   Psychiatric:         Attention and Perception: She is attentive.         Speech: Speech normal.         Behavior: Behavior normal.         Thought Content: Thought content normal.         Judgment: Judgment normal.           Assessment:       1. Type 2 diabetes mellitus without complication, without long-term current use of insulin    2. Primary hypertension    3. MELODY (generalized anxiety disorder)    4. Acute cough    5. Gastroesophageal reflux disease without esophagitis    6. Grieving    7. Screening mammogram for high-risk patient    8. Former smoker    9. Long-term use of high-risk medication         Plan:       Type 2 diabetes mellitus without complication, without long-term current use of insulin  Comments:  Controlled. A1c 6.6%. To continue to monitor ADA, regular BS, and regular exercise.  Orders:  -     Hemoglobin A1C, POCT  -     TSH w/reflex to FT4; Future; Expected  date: 11/01/2023  -     Urinalysis, Reflex to Urine Culture Urine, Clean Catch; Future; Expected date: 11/01/2023  -     CBC Auto Differential; Future; Expected date: 11/01/2023  -     Lipid Panel; Future; Expected date: 11/01/2023  -     Comprehensive Metabolic Panel; Future; Expected date: 11/01/2023  -     Microalbumin/Creatinine Ratio, Urine; Future; Expected date: 11/01/2023  -     Hemoglobin A1C; Future; Expected date: 11/01/2023    Primary hypertension  Comments:  Uncontrolled. Will continue to monitor BP, likely due to pain    MELODY (generalized anxiety disorder)  Comments:  Controlled. Will continue to monitor symptoms    Acute cough  Comments:  Bronchitis vs GERD vs med side effect. Will get Xray due to hx former smoker  Orders:  -     X-Ray Chest PA And Lateral; Future; Expected date: 11/01/2023  -     albuterol (VENTOLIN HFA) 90 mcg/actuation inhaler; Inhale 2 puffs into the lungs every 6 (six) hours as needed for Wheezing. Rescue  Dispense: 8 g; Refill: 0    Gastroesophageal reflux disease without esophagitis  Comments:  Acute. To continue prn tums    Grieving    Screening mammogram for high-risk patient  Comments:  Mammor order sent to [facility]  Orders:  -     Mammo Digital Screening Bilat w/ Epifanio; Future; Expected date: 11/01/2023    Former smoker    Long-term use of high-risk medication  -     TSH w/reflex to FT4; Future; Expected date: 11/01/2023  -     Urinalysis, Reflex to Urine Culture Urine, Clean Catch; Future; Expected date: 11/01/2023  -     CBC Auto Differential; Future; Expected date: 11/01/2023  -     Lipid Panel; Future; Expected date: 11/01/2023  -     Comprehensive Metabolic Panel; Future; Expected date: 11/01/2023  -     Microalbumin/Creatinine Ratio, Urine; Future; Expected date: 11/01/2023  -     Hemoglobin A1C; Future; Expected date: 11/01/2023      Labs and/or tests have been ordered for the evaluation/monitoring of acute/chronic conditions, to be done just before next visit.    Follow up  in about 3 months (around 2/1/2024) for HTN, Anxiety.        11/1/2023 Fatuma Hollins

## 2023-11-29 ENCOUNTER — HOSPITAL ENCOUNTER (OUTPATIENT)
Dept: RADIOLOGY | Facility: HOSPITAL | Age: 68
Discharge: HOME | End: 2023-11-29
Attending: FAMILY MEDICINE
Payer: MEDICARE

## 2023-11-29 DIAGNOSIS — R05.1 ACUTE COUGH: ICD-10-CM

## 2023-11-29 DIAGNOSIS — Z12.31 SCREENING MAMMOGRAM FOR HIGH-RISK PATIENT: ICD-10-CM

## 2023-11-29 PROCEDURE — 71046 X-RAY EXAM CHEST 2 VIEWS: CPT | Mod: TC,PO

## 2023-11-29 PROCEDURE — 77067 SCR MAMMO BI INCL CAD: CPT | Mod: TC,PO

## 2024-01-11 ENCOUNTER — TELEPHONE (OUTPATIENT)
Dept: FAMILY MEDICINE | Facility: CLINIC | Age: 69
End: 2024-01-11
Payer: MEDICARE

## 2024-01-12 ENCOUNTER — TELEPHONE (OUTPATIENT)
Dept: FAMILY MEDICINE | Facility: CLINIC | Age: 69
End: 2024-01-12
Payer: MEDICARE

## 2024-01-12 NOTE — TELEPHONE ENCOUNTER
----- Message from Blaine Cannon MA sent at 1/12/2024 11:03 AM CST -----  [phone]  The patient is requesting  for an earlier date mainly on  (01/31/24) please contact the above pt with next directive for date selected .She is stating that she, my not be able to take off on the schedule slot (02/01/24)

## 2024-01-12 NOTE — TELEPHONE ENCOUNTER
Spoke with pt. Advised no sooner appt at this time. Can call if a cancellation. Pt agreeable. States she put in PTO to come for appt.

## 2024-01-30 LAB
ALBUMIN SERPL-MCNC: 4.7 G/DL (ref 3.9–4.9)
ALBUMIN/CREAT UR: 17 MG/G CREAT (ref 0–29)
ALBUMIN/GLOB SERPL: 2.4 {RATIO} (ref 1.2–2.2)
ALP SERPL-CCNC: 84 IU/L (ref 44–121)
ALT SERPL-CCNC: 92 IU/L (ref 0–32)
APPEARANCE UR: CLEAR
AST SERPL-CCNC: 44 IU/L (ref 0–40)
BACTERIA #/AREA URNS HPF: NORMAL /[HPF]
BASOPHILS # BLD AUTO: 0.1 X10E3/UL (ref 0–0.2)
BASOPHILS NFR BLD AUTO: 1 %
BILIRUB SERPL-MCNC: 0.4 MG/DL (ref 0–1.2)
BILIRUB UR QL STRIP: NEGATIVE
BUN SERPL-MCNC: 15 MG/DL (ref 8–27)
BUN/CREAT SERPL: 18 (ref 12–28)
CALCIUM SERPL-MCNC: 9.3 MG/DL (ref 8.7–10.3)
CASTS URNS QL MICRO: NORMAL /LPF
CHLORIDE SERPL-SCNC: 101 MMOL/L (ref 96–106)
CHOLEST SERPL-MCNC: 237 MG/DL (ref 100–199)
CO2 SERPL-SCNC: 20 MMOL/L (ref 20–29)
COLOR UR: YELLOW
CREAT SERPL-MCNC: 0.83 MG/DL (ref 0.57–1)
CREAT UR-MCNC: 130.5 MG/DL
EOSINOPHIL # BLD AUTO: 0.2 X10E3/UL (ref 0–0.4)
EOSINOPHIL NFR BLD AUTO: 2 %
EPI CELLS #/AREA URNS HPF: NORMAL /HPF (ref 0–10)
ERYTHROCYTE [DISTWIDTH] IN BLOOD BY AUTOMATED COUNT: 12.8 % (ref 11.7–15.4)
EST. GFR  (NO RACE VARIABLE): 77 ML/MIN/1.73
GLOBULIN SER CALC-MCNC: 2 G/DL (ref 1.5–4.5)
GLUCOSE SERPL-MCNC: 168 MG/DL (ref 70–99)
GLUCOSE UR QL STRIP: NEGATIVE
HBA1C MFR BLD: 6.9 % (ref 4.8–5.6)
HCT VFR BLD AUTO: 46.5 % (ref 34–46.6)
HDLC SERPL-MCNC: 67 MG/DL
HGB BLD-MCNC: 16.1 G/DL (ref 11.1–15.9)
HGB UR QL STRIP: NEGATIVE
IMM GRANULOCYTES # BLD AUTO: 0 X10E3/UL (ref 0–0.1)
IMM GRANULOCYTES NFR BLD AUTO: 0 %
KETONES UR QL STRIP: NEGATIVE
LDLC SERPL CALC-MCNC: 146 MG/DL (ref 0–99)
LEUKOCYTE ESTERASE UR QL STRIP: NEGATIVE
LYMPHOCYTES # BLD AUTO: 2.3 X10E3/UL (ref 0.7–3.1)
LYMPHOCYTES NFR BLD AUTO: 25 %
MCH RBC QN AUTO: 31.3 PG (ref 26.6–33)
MCHC RBC AUTO-ENTMCNC: 34.6 G/DL (ref 31.5–35.7)
MCV RBC AUTO: 90 FL (ref 79–97)
MICRO URNS: NORMAL
MICRO URNS: NORMAL
MICROALBUMIN UR-MCNC: 22.2 UG/ML
MONOCYTES # BLD AUTO: 0.9 X10E3/UL (ref 0.1–0.9)
MONOCYTES NFR BLD AUTO: 9 %
NEUTROPHILS # BLD AUTO: 5.7 X10E3/UL (ref 1.4–7)
NEUTROPHILS NFR BLD AUTO: 63 %
NITRITE UR QL STRIP: NEGATIVE
PH UR STRIP: 6.5 [PH] (ref 5–7.5)
PLATELET # BLD AUTO: 243 X10E3/UL (ref 150–450)
POTASSIUM SERPL-SCNC: 4.8 MMOL/L (ref 3.5–5.2)
PROT SERPL-MCNC: 6.7 G/DL (ref 6–8.5)
PROT UR QL STRIP: NEGATIVE
RBC # BLD AUTO: 5.15 X10E6/UL (ref 3.77–5.28)
RBC #/AREA URNS HPF: NORMAL /HPF (ref 0–2)
SODIUM SERPL-SCNC: 137 MMOL/L (ref 134–144)
SP GR UR STRIP: 1.02 (ref 1–1.03)
TRIGL SERPL-MCNC: 134 MG/DL (ref 0–149)
TSH SERPL DL<=0.005 MIU/L-ACNC: 2.48 UIU/ML (ref 0.45–4.5)
URINALYSIS REFLEX: NORMAL
UROBILINOGEN UR STRIP-MCNC: 0.2 MG/DL (ref 0.2–1)
VLDLC SERPL CALC-MCNC: 24 MG/DL (ref 5–40)
WBC # BLD AUTO: 9.1 X10E3/UL (ref 3.4–10.8)
WBC #/AREA URNS HPF: NORMAL /HPF (ref 0–5)

## 2024-01-31 NOTE — PROGRESS NOTES
SUBJECTIVE:    Patient ID: Della Juarez is a 68 y.o. female.    Chief Complaint: Follow-up (3 mo f/u//no med bottles//fatigue//discuss cologuard test//last eye exam June or July of 2023//foot exam ordered//declined flu vac//tc)      Pt here to checkup on acute and chronic conditions.    A1c is 6.9%.  Working 2 jobs at the time. Has gained 8 pounds since last visit.    BP is slightly elevated today.  Having car problems today.  Denies CP/AH/SOB. Weight is stable.    Has been finished PT for right leg that has been helping a lot. No longer swelling.  Stands all day at work. Takes ibuprofen as needed.     Had labs done: TSH 2.48, , AST 44, ALT 92, microalb/cr 19. (Mom had hemachromatosis)    Went to the [employer] yesterday and her car started acting up and she was having a lot of associated stress. And now she has to take the car to the shop tomorrow. Work is still making her anxious. Trying to ignore the nonsense at work.       --------------------------------------------------------  Mammogram nl, 11/2023.                      Decided not to do Cologuard due to insurance reasons. Will do cscope at some point.  Eye exams by ([facility])  Pt not taking statin due to not wanting to in preference to working on diet and exercise.            Office Visit on 11/01/2023   Component Date Value Ref Range Status    Hemoglobin A1C, POC 11/01/2023 6.6  % Final    TSH 01/29/2024 2.480  0.450 - 4.500 uIU/mL Final    Specific Gravity, UA 01/29/2024 1.018  1.005 - 1.030 Final    pH, UA 01/29/2024 6.5  5.0 - 7.5 Final    Color, UA 01/29/2024 Yellow  Yellow Final    Clarity, UA 01/29/2024 Clear  Clear Final    Leukocytes, UA 01/29/2024 Negative  Negative Final    Protein, UA 01/29/2024 Negative  Negative/Trace Final    Glucose, UA 01/29/2024 Negative  Negative Final    Ketones, UA 01/29/2024 Negative  Negative Final    Occult Blood UA 01/29/2024 Negative  Negative Final    Bilirubin, UA 01/29/2024 Negative  Negative Final     Urobilinogen, UA 01/29/2024 0.2  0.2 - 1.0 mg/dL Final    Nitrite, UA 01/29/2024 Negative  Negative Final    Microscopic Examination 01/29/2024 Comment   Final    Microscopic Examination 01/29/2024 See below:   Final    Urinalysis Reflex 01/29/2024 Comment   Final    WBC 01/29/2024 9.1  3.4 - 10.8 x10E3/uL Final    RBC 01/29/2024 5.15  3.77 - 5.28 x10E6/uL Final    Hemoglobin 01/29/2024 16.1 (H)  11.1 - 15.9 g/dL Final    Hematocrit 01/29/2024 46.5  34.0 - 46.6 % Final    MCV 01/29/2024 90  79 - 97 fL Final    MCH 01/29/2024 31.3  26.6 - 33.0 pg Final    MCHC 01/29/2024 34.6  31.5 - 35.7 g/dL Final    RDW 01/29/2024 12.8  11.7 - 15.4 % Final    Platelets 01/29/2024 243  150 - 450 x10E3/uL Final    Neutrophils 01/29/2024 63  Not Estab. % Final    Lymphs 01/29/2024 25  Not Estab. % Final    Monocytes 01/29/2024 9  Not Estab. % Final    Eos 01/29/2024 2  Not Estab. % Final    Basos 01/29/2024 1  Not Estab. % Final    Neutrophils (Absolute) 01/29/2024 5.7  1.4 - 7.0 x10E3/uL Final    Lymphs (Absolute) 01/29/2024 2.3  0.7 - 3.1 x10E3/uL Final    Monocytes(Absolute) 01/29/2024 0.9  0.1 - 0.9 x10E3/uL Final    Eos (Absolute) 01/29/2024 0.2  0.0 - 0.4 x10E3/uL Final    Baso (Absolute) 01/29/2024 0.1  0.0 - 0.2 x10E3/uL Final    Immature Granulocytes 01/29/2024 0  Not Estab. % Final    Immature Grans (Abs) 01/29/2024 0.0  0.0 - 0.1 x10E3/uL Final    Cholesterol 01/29/2024 237 (H)  100 - 199 mg/dL Final    Triglycerides 01/29/2024 134  0 - 149 mg/dL Final    HDL 01/29/2024 67  >39 mg/dL Final    VLDL Cholesterol Storm 01/29/2024 24  5 - 40 mg/dL Final    LDL Calculated 01/29/2024 146 (H)  0 - 99 mg/dL Final    Glucose 01/29/2024 168 (H)  70 - 99 mg/dL Final    BUN 01/29/2024 15  8 - 27 mg/dL Final    Creatinine 01/29/2024 0.83  0.57 - 1.00 mg/dL Final    eGFR 01/29/2024 77  >59 mL/min/1.73 Final    BUN/Creatinine Ratio 01/29/2024 18  12 - 28 Final    Sodium 01/29/2024 137  134 - 144 mmol/L Final    Potassium 01/29/2024 4.8  3.5  - 5.2 mmol/L Final    Chloride 2024 101  96 - 106 mmol/L Final    CO2 2024 20  20 - 29 mmol/L Final    Calcium 2024 9.3  8.7 - 10.3 mg/dL Final    Protein, Total 2024 6.7  6.0 - 8.5 g/dL Final    Albumin 2024 4.7  3.9 - 4.9 g/dL Final    Globulin, Total 2024 2.0  1.5 - 4.5 g/dL Final    Albumin/Globulin Ratio 2024 2.4 (H)  1.2 - 2.2 Final    Total Bilirubin 2024 0.4  0.0 - 1.2 mg/dL Final    Alkaline Phosphatase 2024 84  44 - 121 IU/L Final    AST 2024 44 (H)  0 - 40 IU/L Final    ALT 2024 92 (H)  0 - 32 IU/L Final    Creatinine, Urine 2024 130.5  Not Estab. mg/dL Final    Microalb, Ur 2024 22.2  Not Estab. ug/mL Final    Microalb/Crt. Ratio 2024 17  0 - 29 mg/g creat Final    Hemoglobin A1c 2024 6.9 (H)  4.8 - 5.6 % Final    WBC, UA 2024 None seen  0 - 5 /hpf Final    RBC, UA 2024 0-2  0 - 2 /hpf Final    Epithelial Cells (non renal) 2024 0-10  0 - 10 /hpf Final    Casts 2024 None seen  None seen /lpf Final    Bacteria, UA 2024 None seen  None seen/Few Final       Past Medical History:   Diagnosis Date    Diabetes mellitus     Hypertension     Mental disorder     Anxiety     Social History     Socioeconomic History    Marital status:    Tobacco Use    Smoking status: Former     Current packs/day: 0.00     Average packs/day: 0.5 packs/day for 11.0 years (5.5 ttl pk-yrs)     Types: Cigarettes     Start date:      Quit date: 2016     Years since quittin.0     Passive exposure: Never    Smokeless tobacco: Never   Substance and Sexual Activity    Alcohol use: No    Drug use: No    Sexual activity: Not Currently     Past Surgical History:   Procedure Laterality Date     SECTION      x3    COSMETIC SURGERY      Scar on right side of face     Family History   Problem Relation Age of Onset    Hemochromatosis Mother     Hypertension Mother     Stroke Mother     Diabetes Father      Hypertension Father     Breast cancer Paternal Aunt     Diabetes Maternal Grandmother     Hypertension Maternal Grandmother     Stroke Maternal Grandmother     Cancer Neg Hx     Colon cancer Neg Hx     Ovarian cancer Neg Hx     Eclampsia Neg Hx     Miscarriages / Stillbirths Neg Hx      labor Neg Hx        Review of patient's allergies indicates:   Allergen Reactions    Amlodipine      Other reaction(s): Not available    Penicillins      Other reaction(s): Vomiting  Other reaction(s): Swelling  Other reaction(s): Nausea  Other reaction(s): Hives       Current Outpatient Medications:     ascorbic acid, vitamin C, (VITAMIN C) 500 MG tablet, Take 500 mg by mouth once daily., Disp: , Rfl:     aspirin (ECOTRIN) 81 MG EC tablet, Take 1 tablet (81 mg total) by mouth once daily., Disp: , Rfl: 0    clotrimazole-betamethasone 1-0.05% (LOTRISONE) cream, Apply topically 2 (two) times daily., Disp: 45 g, Rfl: 1    cyanocobalamin 2000 MCG tablet, Take 2,000 mcg by mouth once daily., Disp: , Rfl:     multivitamin capsule, Take 1 capsule by mouth once daily., Disp: , Rfl:     vitamin D (VITAMIN D3) 1000 units Tab, Take 5,000 Units by mouth once daily., Disp: , Rfl:     zinc gluconate 50 mg tablet, Take 50 mg by mouth once daily., Disp: , Rfl:     ALPRAZolam (XANAX) 0.5 MG tablet, Take 1 tablet (0.5 mg total) by mouth 2 (two) times daily., Disp: 60 tablet, Rfl: 5    amLODIPine (NORVASC) 2.5 MG tablet, Take 1 tablet (2.5 mg total) by mouth once daily., Disp: 90 tablet, Rfl: 1    blood sugar diagnostic Strp, To check BG two times daily, to use with insurance preferred meter, Disp: 200 each, Rfl: 1    blood-glucose meter kit, To check BG two times daily, to use with insurance preferred meter, Disp: 1 each, Rfl: 0    glimepiride (AMARYL) 2 MG tablet, Take 1 tablet (2 mg total) by mouth 2 (two) times a day., Disp: 180 tablet, Rfl: 3    lancets Misc, To check BG two times daily, to use with insurance preferred meter, Disp: 200  "each, Rfl: 1    losartan (COZAAR) 100 MG tablet, Take 1 tablet (100 mg total) by mouth once daily., Disp: 90 tablet, Rfl: 3    metFORMIN (GLUCOPHAGE-XR) 500 MG ER 24hr tablet, Take 1 tablet (500 mg total) by mouth 2 (two) times daily with meals., Disp: 180 tablet, Rfl: 3    Review of Systems   Constitutional:  Negative for appetite change, fatigue, fever and unexpected weight change.   Respiratory:  Positive for cough. Negative for chest tightness, shortness of breath and wheezing.    Cardiovascular:  Negative for chest pain and leg swelling.   Gastrointestinal:  Negative for abdominal pain, constipation, nausea and vomiting.        +heartburn   Genitourinary:  Negative for difficulty urinating, dysuria, frequency and urgency.   Musculoskeletal:  Negative for arthralgias, back pain, myalgias and neck pain.   Skin:  Negative for rash.   Neurological:  Negative for dizziness, weakness, numbness and headaches.   Hematological:  Does not bruise/bleed easily.   Psychiatric/Behavioral:  Negative for dysphoric mood, sleep disturbance and suicidal ideas. The patient is nervous/anxious.    All other systems reviewed and are negative.         Objective:      Vitals:    02/01/24 1538 02/01/24 1553   BP: (!) 148/90 (!) 138/92   Pulse: 88    Weight: 108 kg (238 lb)    Height: 5' 4.5" (1.638 m)        Wt Readings from Last 6 Encounters:   02/01/24 108 kg (238 lb)   11/01/23 109.4 kg (241 lb 3.2 oz)   10/05/23 105.7 kg (233 lb)   09/07/23 105.7 kg (233 lb)   07/28/23 105.7 kg (233 lb)   03/29/23 106.1 kg (234 lb)         Physical Exam  Vitals reviewed.   Constitutional:       General: She is not in acute distress.     Appearance: Normal appearance. She is well-developed.      Comments: Obese     HENT:      Head: Normocephalic and atraumatic.   Neck:      Thyroid: No thyromegaly.   Cardiovascular:      Rate and Rhythm: Normal rate and regular rhythm.      Heart sounds: Normal heart sounds. No murmur heard.     No friction rub. "   Pulmonary:      Effort: Pulmonary effort is normal.      Breath sounds: Normal breath sounds. No wheezing or rales.   Abdominal:      General: Bowel sounds are normal. There is no distension.      Palpations: Abdomen is soft.      Tenderness: There is no abdominal tenderness.   Musculoskeletal:      Cervical back: Neck supple.      Right lower leg: No swelling. No edema.   Lymphadenopathy:      Cervical: No cervical adenopathy.   Skin:     General: Skin is warm and dry.      Findings: No rash.   Neurological:      Mental Status: She is alert and oriented to person, place, and time.   Psychiatric:         Attention and Perception: She is attentive.         Speech: Speech normal.         Behavior: Behavior normal.         Thought Content: Thought content normal.         Judgment: Judgment normal.           Assessment:       1. Essential hypertension    2. Type 2 diabetes mellitus without complication, without long-term current use of insulin    3. MELODY (generalized anxiety disorder)    4. Elevated liver enzymes    5. Chronic fatigue    6. Long-term use of high-risk medication    7. Medication refill    8. Colon cancer screening           Plan:       Essential hypertension  Comments:  Uncontrolled. Will increase amlodipine.  Orders:  -     Discontinue: amLODIPine (NORVASC) 5 MG tablet; Take 1 tablet (5 mg total) by mouth once daily.  Dispense: 90 tablet; Refill: 1  -     losartan (COZAAR) 100 MG tablet; Take 1 tablet (100 mg total) by mouth once daily.  Dispense: 90 tablet; Refill: 3  -     amLODIPine (NORVASC) 2.5 MG tablet; Take 1 tablet (2.5 mg total) by mouth once daily.  Dispense: 90 tablet; Refill: 1    Type 2 diabetes mellitus without complication, without long-term current use of insulin  Comments:  Uncontrolled. A1c 6.9%. To resume ADA diet, regular BS checks, regular exercise. Will continue to monitor A1c.  Orders:  -     glimepiride (AMARYL) 2 MG tablet; Take 1 tablet (2 mg total) by mouth 2 (two) times a  day.  Dispense: 180 tablet; Refill: 3  -     metFORMIN (GLUCOPHAGE-XR) 500 MG ER 24hr tablet; Take 1 tablet (500 mg total) by mouth 2 (two) times daily with meals.  Dispense: 180 tablet; Refill: 3    MELODY (generalized anxiety disorder)  Comments:  Controlled. Will continue to monitor symptoms    Elevated liver enzymes  Comments:  Intermittent. Will check Hep C and ferritin  Orders:  -     Cancel: FERRITIN; Future; Expected date: 2024  -     Cancel: HEPATITIS C ANTIBODY; Future; Expected date: 2024  -     Cancel: Comprehensive Metabolic Panel; Future; Expected date: 2024  -     Comprehensive Metabolic Panel; Future; Expected date: 2024  -     FERRITIN; Future; Expected date: 2024  -     HEPATITIS C ANTIBODY; Future; Expected date: 2024    Chronic fatigue  Comments:  Acute. Will check cortisol level  Orders:  -     Cancel: CORTISOL, 8AM; Future; Expected date: 2024  -     CORTISOL, 8AM; Future; Expected date: 2024    Long-term use of high-risk medication  -     Cancel: FERRITIN; Future; Expected date: 2024  -     Cancel: HEPATITIS C ANTIBODY; Future; Expected date: 2024  -     Cancel: Comprehensive Metabolic Panel; Future; Expected date: 2024  -     CORTISOL, 8AM; Future; Expected date: 2024  -     Comprehensive Metabolic Panel; Future; Expected date: 2024  -     FERRITIN; Future; Expected date: 2024  -     HEPATITIS C ANTIBODY; Future; Expected date: 2024    Medication refill  -     ALPRAZolam (XANAX) 0.5 MG tablet; Take 1 tablet (0.5 mg total) by mouth 2 (two) times daily.  Dispense: 60 tablet; Refill: 5    Colon cancer screening  Comments:  Pt prefers to do cscope as cologuard has .      Other  Lab results discussed and reviewed with patient.  Labs and/or tests have been ordered for the evaluation/monitoring of acute/chronic conditions, to be done just before next visit.    Follow up in about 4 months (around 2024)  for HTN, DM, Anxiety.        2/1/2024 Fatuma Hollins

## 2024-02-01 ENCOUNTER — OFFICE VISIT (OUTPATIENT)
Dept: FAMILY MEDICINE | Facility: CLINIC | Age: 69
End: 2024-02-01
Payer: MEDICARE

## 2024-02-01 VITALS
WEIGHT: 238 LBS | DIASTOLIC BLOOD PRESSURE: 92 MMHG | HEART RATE: 88 BPM | HEIGHT: 65 IN | SYSTOLIC BLOOD PRESSURE: 138 MMHG | BODY MASS INDEX: 39.65 KG/M2

## 2024-02-01 DIAGNOSIS — R53.82 CHRONIC FATIGUE: ICD-10-CM

## 2024-02-01 DIAGNOSIS — E11.9 TYPE 2 DIABETES MELLITUS WITHOUT COMPLICATION, WITHOUT LONG-TERM CURRENT USE OF INSULIN: ICD-10-CM

## 2024-02-01 DIAGNOSIS — Z12.11 COLON CANCER SCREENING: ICD-10-CM

## 2024-02-01 DIAGNOSIS — Z79.899 LONG-TERM USE OF HIGH-RISK MEDICATION: ICD-10-CM

## 2024-02-01 DIAGNOSIS — I10 ESSENTIAL HYPERTENSION: Primary | ICD-10-CM

## 2024-02-01 DIAGNOSIS — F41.1 GAD (GENERALIZED ANXIETY DISORDER): ICD-10-CM

## 2024-02-01 DIAGNOSIS — R74.8 ELEVATED LIVER ENZYMES: ICD-10-CM

## 2024-02-01 DIAGNOSIS — Z76.0 MEDICATION REFILL: ICD-10-CM

## 2024-02-01 PROCEDURE — 4010F ACE/ARB THERAPY RXD/TAKEN: CPT | Mod: CPTII,S$GLB,, | Performed by: FAMILY MEDICINE

## 2024-02-01 PROCEDURE — 3080F DIAST BP >= 90 MM HG: CPT | Mod: CPTII,S$GLB,, | Performed by: FAMILY MEDICINE

## 2024-02-01 PROCEDURE — 1159F MED LIST DOCD IN RCRD: CPT | Mod: CPTII,S$GLB,, | Performed by: FAMILY MEDICINE

## 2024-02-01 PROCEDURE — 3288F FALL RISK ASSESSMENT DOCD: CPT | Mod: CPTII,S$GLB,, | Performed by: FAMILY MEDICINE

## 2024-02-01 PROCEDURE — 3075F SYST BP GE 130 - 139MM HG: CPT | Mod: CPTII,S$GLB,, | Performed by: FAMILY MEDICINE

## 2024-02-01 PROCEDURE — 3061F NEG MICROALBUMINURIA REV: CPT | Mod: CPTII,S$GLB,, | Performed by: FAMILY MEDICINE

## 2024-02-01 PROCEDURE — 3066F NEPHROPATHY DOC TX: CPT | Mod: CPTII,S$GLB,, | Performed by: FAMILY MEDICINE

## 2024-02-01 PROCEDURE — 1160F RVW MEDS BY RX/DR IN RCRD: CPT | Mod: CPTII,S$GLB,, | Performed by: FAMILY MEDICINE

## 2024-02-01 PROCEDURE — 1101F PT FALLS ASSESS-DOCD LE1/YR: CPT | Mod: CPTII,S$GLB,, | Performed by: FAMILY MEDICINE

## 2024-02-01 PROCEDURE — 3008F BODY MASS INDEX DOCD: CPT | Mod: CPTII,S$GLB,, | Performed by: FAMILY MEDICINE

## 2024-02-01 PROCEDURE — 99214 OFFICE O/P EST MOD 30 MIN: CPT | Mod: S$GLB,,, | Performed by: FAMILY MEDICINE

## 2024-02-01 PROCEDURE — 3044F HG A1C LEVEL LT 7.0%: CPT | Mod: CPTII,S$GLB,, | Performed by: FAMILY MEDICINE

## 2024-02-01 RX ORDER — LOSARTAN POTASSIUM 100 MG/1
100 TABLET ORAL DAILY
Qty: 90 TABLET | Refills: 3 | Status: SHIPPED | OUTPATIENT
Start: 2024-02-01 | End: 2024-06-19 | Stop reason: SDUPTHER

## 2024-02-01 RX ORDER — METFORMIN HYDROCHLORIDE 500 MG/1
500 TABLET, EXTENDED RELEASE ORAL 2 TIMES DAILY WITH MEALS
Qty: 180 TABLET | Refills: 3 | Status: SHIPPED | OUTPATIENT
Start: 2024-02-01 | End: 2024-06-19 | Stop reason: SDUPTHER

## 2024-02-01 RX ORDER — GLIMEPIRIDE 2 MG/1
2 TABLET ORAL 2 TIMES DAILY
Qty: 180 TABLET | Refills: 3 | Status: SHIPPED | OUTPATIENT
Start: 2024-02-01

## 2024-02-01 RX ORDER — AMLODIPINE BESYLATE 5 MG/1
5 TABLET ORAL DAILY
Qty: 90 TABLET | Refills: 1 | Status: SHIPPED | OUTPATIENT
Start: 2024-02-01 | End: 2024-02-01 | Stop reason: SDUPTHER

## 2024-02-01 RX ORDER — ALPRAZOLAM 0.5 MG/1
0.5 TABLET ORAL 2 TIMES DAILY
Qty: 60 TABLET | Refills: 5 | Status: SHIPPED | OUTPATIENT
Start: 2024-02-01 | End: 2024-06-19 | Stop reason: SDUPTHER

## 2024-02-01 RX ORDER — AMLODIPINE BESYLATE 2.5 MG/1
2.5 TABLET ORAL DAILY
Qty: 90 TABLET | Refills: 1 | Status: SHIPPED | OUTPATIENT
Start: 2024-02-01 | End: 2024-06-19 | Stop reason: SDUPTHER

## 2024-03-12 ENCOUNTER — PATIENT MESSAGE (OUTPATIENT)
Dept: ADMINISTRATIVE | Facility: HOSPITAL | Age: 69
End: 2024-03-12
Payer: MEDICARE

## 2024-03-28 ENCOUNTER — PATIENT MESSAGE (OUTPATIENT)
Dept: ADMINISTRATIVE | Facility: HOSPITAL | Age: 69
End: 2024-03-28
Payer: MEDICARE

## 2024-06-06 ENCOUNTER — TELEPHONE (OUTPATIENT)
Dept: FAMILY MEDICINE | Facility: CLINIC | Age: 69
End: 2024-06-06
Payer: MEDICARE

## 2024-06-14 ENCOUNTER — TELEPHONE (OUTPATIENT)
Dept: FAMILY MEDICINE | Facility: CLINIC | Age: 69
End: 2024-06-14
Payer: MEDICARE

## 2024-06-14 NOTE — TELEPHONE ENCOUNTER
Labs were sent to [facility].  Lisandra put the wrong lab in her message.   Pt notified and voiced understanding.

## 2024-06-14 NOTE — TELEPHONE ENCOUNTER
----- Message from Flora Krishna sent at 6/14/2024 11:25 AM CDT -----  Pt states she always go to [facility] to get labs done. Pt calling for bro. Asked if a nurse is able to speak with her about her labs as well.   [phone]

## 2024-06-16 NOTE — PROGRESS NOTES
SUBJECTIVE:    Patient ID: Della Juarez is a 68 y.o. female.    Chief Complaint: Follow-up (4 mo f/u//no med bottles//POCT A1C ordered//no foot issues//last eye exam 11/2024, cannot remember where//declined colonoscopy//tc)      Pt here to checkup on acute and chronic conditions.    A1c is 7.2%.  Has been eating a lot of chocolate.  Working 2 jobs at the time. Weight is stable.     BP is slightly elevated today. Has not been taking amlodipine because she was sent a paper in the mail stating that there was a possible interaction with losartan.  Was rushing to visit today.  Denies CP/AH/SOB. Weight is stable.    Always tired, takes naps all the time. Has to take a nap 30-45 minutes when she comes home from work daily.      Having a pain in the back of her medial right knee and down the medial lower leg. When she took time off work, it did not bother her as much. When she went back to work it bothered her again. She takes 2 ibuprofen at night No longer swelling.  Stands all day at work. Takes ibuprofen as needed.     Had labs done: cortisol 15.8, Alt 57, Hep C negative.    Anxiety has been  doing ok. Has developed a new attitude especially at work.       --------------------------------------------------------  Mammogram nl, 11/2023.                      Due for cscope.  Eye exams by (Vib[facility])  Pt not taking statin due to not wanting to in preference to working on diet and exercise.            Office Visit on 06/19/2024   Component Date Value Ref Range Status    Hemoglobin A1C, POC 06/19/2024 7.2  % Final   Office Visit on 02/01/2024   Component Date Value Ref Range Status    AM Cortisol 06/17/2024 15.8  6.2 - 19.4 ug/dL Final    Glucose 06/17/2024 150 (H)  70 - 99 mg/dL Final    BUN 06/17/2024 20  8 - 27 mg/dL Final    Creatinine 06/17/2024 0.96  0.57 - 1.00 mg/dL Final    eGFR 06/17/2024 65  >59 mL/min/1.73 Final    BUN/Creatinine Ratio 06/17/2024 21  12 - 28 Final    Sodium 06/17/2024 137  134 - 144 mmol/L Final     Potassium 2024 4.7  3.5 - 5.2 mmol/L Final    Chloride 2024 101  96 - 106 mmol/L Final    CO2 2024 19 (L)  20 - 29 mmol/L Final    Calcium 2024 9.2  8.7 - 10.3 mg/dL Final    Protein, Total 2024 6.6  6.0 - 8.5 g/dL Final    Albumin 2024 4.1  3.9 - 4.9 g/dL Final    Globulin, Total 2024 2.5  1.5 - 4.5 g/dL Final    Total Bilirubin 2024 0.3  0.0 - 1.2 mg/dL Final    Alkaline Phosphatase 2024 78  44 - 121 IU/L Final    AST 2024 30  0 - 40 IU/L Final    ALT 2024 45 (H)  0 - 32 IU/L Final    Ferritin 2024 95  15 - 150 ng/mL Final    Hep C Virus Ab Signal/Cutoff 2024 Non Reactive  Non Reactive Final       Past Medical History:   Diagnosis Date    Diabetes mellitus     Hypertension     Mental disorder     Anxiety     Social History     Socioeconomic History    Marital status:    Tobacco Use    Smoking status: Former     Current packs/day: 0.00     Average packs/day: 0.5 packs/day for 11.0 years (5.5 ttl pk-yrs)     Types: Cigarettes     Start date:      Quit date: 2016     Years since quittin.4     Passive exposure: Never    Smokeless tobacco: Never   Substance and Sexual Activity    Alcohol use: No    Drug use: No    Sexual activity: Not Currently     Social Determinants of Health     Financial Resource Strain: Patient Declined (2024)    Overall Financial Resource Strain (CARDIA)     Difficulty of Paying Living Expenses: Patient declined   Food Insecurity: Patient Declined (2024)    Hunger Vital Sign     Worried About Running Out of Food in the Last Year: Patient declined     Ran Out of Food in the Last Year: Patient declined   Physical Activity: Unknown (2024)    Exercise Vital Sign     Days of Exercise per Week: 4 days   Stress: Stress Concern Present (2024)    [employer] - Occupational Stress Questionnaire     Feeling of Stress : To some extent   Housing Stability: Unknown  (2024)    Housing Stability Vital Sign     Unable to Pay for Housing in the Last Year: Patient declined     Past Surgical History:   Procedure Laterality Date     SECTION      x3    COSMETIC SURGERY      Scar on right side of face     Family History   Problem Relation Name Age of Onset    Hemochromatosis Mother      Hypertension Mother      Stroke Mother      Diabetes Father      Hypertension Father      Breast cancer Paternal Aunt      Diabetes Maternal Grandmother      Hypertension Maternal Grandmother      Stroke Maternal Grandmother      Cancer Neg Hx      Colon cancer Neg Hx      Ovarian cancer Neg Hx      Eclampsia Neg Hx      Miscarriages / Stillbirths Neg Hx       labor Neg Hx         Review of patient's allergies indicates:   Allergen Reactions    Penicillins      Other reaction(s): Vomiting  Other reaction(s): Swelling  Other reaction(s): Nausea  Other reaction(s): Hives       Current Outpatient Medications:     ascorbic acid, vitamin C, (VITAMIN C) 500 MG tablet, Take 500 mg by mouth once daily., Disp: , Rfl:     aspirin (ECOTRIN) 81 MG EC tablet, Take 1 tablet (81 mg total) by mouth once daily., Disp: , Rfl: 0    cyanocobalamin 2000 MCG tablet, Take 2,000 mcg by mouth once daily., Disp: , Rfl:     glimepiride (AMARYL) 2 MG tablet, Take 1 tablet (2 mg total) by mouth 2 (two) times a day., Disp: 180 tablet, Rfl: 3    multivitamin capsule, Take 1 capsule by mouth once daily., Disp: , Rfl:     vitamin D (VITAMIN D3) 1000 units Tab, Take 5,000 Units by mouth once daily., Disp: , Rfl:     zinc gluconate 50 mg tablet, Take 50 mg by mouth once daily., Disp: , Rfl:     ALPRAZolam (XANAX) 0.5 MG tablet, Take 1 tablet (0.5 mg total) by mouth 2 (two) times daily., Disp: 60 tablet, Rfl: 5    amLODIPine (NORVASC) 2.5 MG tablet, Take 1 tablet (2.5 mg total) by mouth once daily., Disp: 90 tablet, Rfl: 1    blood sugar diagnostic Strp, To check BG two times daily, to use with insurance preferred  meter, Disp: 200 each, Rfl: 1    blood-glucose meter kit, To check BG two times daily, to use with insurance preferred meter, Disp: 1 each, Rfl: 0    clotrimazole-betamethasone 1-0.05% (LOTRISONE) cream, Apply topically 2 (two) times daily., Disp: 45 g, Rfl: 1    lancets Misc, To check BG two times daily, to use with insurance preferred meter, Disp: 200 each, Rfl: 1    losartan (COZAAR) 100 MG tablet, Take 1 tablet (100 mg total) by mouth once daily., Disp: 90 tablet, Rfl: 3    metFORMIN (GLUCOPHAGE-XR) 500 MG ER 24hr tablet, Take 1 tablet (500 mg total) by mouth 2 (two) times daily with meals., Disp: 180 tablet, Rfl: 3    Review of Systems   Constitutional:  Positive for activity change. Negative for appetite change, fatigue, fever and unexpected weight change.   HENT:  Negative for hearing loss, rhinorrhea and trouble swallowing.    Eyes:  Negative for discharge and visual disturbance.   Respiratory:  Negative for cough, chest tightness, shortness of breath and wheezing.    Cardiovascular:  Negative for chest pain, palpitations and leg swelling.   Gastrointestinal:  Negative for abdominal pain, blood in stool, constipation, diarrhea, nausea and vomiting.        +heartburn   Endocrine: Negative for polydipsia and polyuria.   Genitourinary:  Negative for difficulty urinating, dysuria, frequency, hematuria, menstrual problem and urgency.   Musculoskeletal:  Negative for arthralgias, back pain, joint swelling, myalgias and neck pain.   Skin:  Positive for rash (left elbow).   Neurological:  Negative for dizziness, weakness, numbness and headaches.   Hematological:  Does not bruise/bleed easily.   Psychiatric/Behavioral:  Negative for confusion, dysphoric mood, sleep disturbance and suicidal ideas. The patient is nervous/anxious.    All other systems reviewed and are negative.         Objective:      Vitals:    06/19/24 1131 06/19/24 1144   BP: (!) 162/94 (!) 150/94   Pulse: 86    Weight: 106.6 kg (235 lb)    Height:  "5' 4.5" (1.638 m)          Wt Readings from Last 6 Encounters:   06/19/24 106.6 kg (235 lb)   02/01/24 108 kg (238 lb)   11/01/23 109.4 kg (241 lb 3.2 oz)   10/05/23 105.7 kg (233 lb)   09/07/23 105.7 kg (233 lb)   07/28/23 105.7 kg (233 lb)         Physical Exam  Vitals reviewed.   Constitutional:       General: She is not in acute distress.     Appearance: Normal appearance. She is well-developed.      Comments: Obese     HENT:      Head: Normocephalic and atraumatic.   Neck:      Thyroid: No thyromegaly.   Cardiovascular:      Rate and Rhythm: Normal rate and regular rhythm.      Heart sounds: Normal heart sounds. No murmur heard.     No friction rub.   Pulmonary:      Effort: Pulmonary effort is normal.      Breath sounds: Normal breath sounds. No wheezing or rales.   Abdominal:      General: Bowel sounds are normal. There is no distension.      Palpations: Abdomen is soft.      Tenderness: There is no abdominal tenderness.   Musculoskeletal:      Cervical back: Neck supple.      Right lower leg: No swelling. No edema.   Lymphadenopathy:      Cervical: No cervical adenopathy.   Skin:     General: Skin is warm and dry.      Findings: Rash present.      Comments: Hyperpigmented scaly rash on the extensor surface of left elbow   Neurological:      Mental Status: She is alert and oriented to person, place, and time.   Psychiatric:         Attention and Perception: She is attentive.         Speech: Speech normal.         Behavior: Behavior normal.         Thought Content: Thought content normal.         Judgment: Judgment normal.           Assessment:       1. Essential hypertension    2. Type 2 diabetes mellitus without complication, without long-term current use of insulin    3. MELODY (generalized anxiety disorder)    4. Eczema, unspecified type             Plan:       Essential hypertension  Comments:  Uncontrolled. To resume amlodpine.  Orders:  -     losartan (COZAAR) 100 MG tablet; Take 1 tablet (100 mg total) by " mouth once daily.  Dispense: 90 tablet; Refill: 3  -     amLODIPine (NORVASC) 2.5 MG tablet; Take 1 tablet (2.5 mg total) by mouth once daily.  Dispense: 90 tablet; Refill: 1    Type 2 diabetes mellitus without complication, without long-term current use of insulin  Comments:  Uncontrolled. A1c 7.2%. To resume ADA diet, avoid chocolate, regular BS checks, regular exercise. Will continue to monitor A1c.  Orders:  -     POCT HEMOGLOBIN A1C  -     metFORMIN (GLUCOPHAGE-XR) 500 MG ER 24hr tablet; Take 1 tablet (500 mg total) by mouth 2 (two) times daily with meals.  Dispense: 180 tablet; Refill: 3    MELODY (generalized anxiety disorder)  Comments:  Controlled. Will continue to monitor symptoms.  Orders:  -     ALPRAZolam (XANAX) 0.5 MG tablet; Take 1 tablet (0.5 mg total) by mouth 2 (two) times daily.  Dispense: 60 tablet; Refill: 5    Eczema, unspecified type  -     clotrimazole-betamethasone 1-0.05% (LOTRISONE) cream; Apply topically 2 (two) times daily.  Dispense: 45 g; Refill: 1        Other  Lab results discussed and reviewed with patient.    Follow up in about 4 months (around 10/19/2024) for HTN, DM, Anxiety.        6/19/2024 Fatuma Hollins

## 2024-06-18 LAB
ALBUMIN SERPL-MCNC: 4.1 G/DL (ref 3.9–4.9)
ALP SERPL-CCNC: 78 IU/L (ref 44–121)
ALT SERPL-CCNC: 45 IU/L (ref 0–32)
AST SERPL-CCNC: 30 IU/L (ref 0–40)
BILIRUB SERPL-MCNC: 0.3 MG/DL (ref 0–1.2)
BUN SERPL-MCNC: 20 MG/DL (ref 8–27)
BUN/CREAT SERPL: 21 (ref 12–28)
CALCIUM SERPL-MCNC: 9.2 MG/DL (ref 8.7–10.3)
CHLORIDE SERPL-SCNC: 101 MMOL/L (ref 96–106)
CO2 SERPL-SCNC: 19 MMOL/L (ref 20–29)
CORTIS AM PEAK SERPL-MCNC: 15.8 UG/DL (ref 6.2–19.4)
CREAT SERPL-MCNC: 0.96 MG/DL (ref 0.57–1)
EST. GFR  (NO RACE VARIABLE): 65 ML/MIN/1.73
FERRITIN SERPL-MCNC: 95 NG/ML (ref 15–150)
GLOBULIN SER CALC-MCNC: 2.5 G/DL (ref 1.5–4.5)
GLUCOSE SERPL-MCNC: 150 MG/DL (ref 70–99)
HCV IGG SERPL QL IA: NON REACTIVE
POTASSIUM SERPL-SCNC: 4.7 MMOL/L (ref 3.5–5.2)
PROT SERPL-MCNC: 6.6 G/DL (ref 6–8.5)
SODIUM SERPL-SCNC: 137 MMOL/L (ref 134–144)

## 2024-06-19 ENCOUNTER — OFFICE VISIT (OUTPATIENT)
Dept: FAMILY MEDICINE | Facility: CLINIC | Age: 69
End: 2024-06-19
Payer: MEDICARE

## 2024-06-19 VITALS
WEIGHT: 235 LBS | BODY MASS INDEX: 39.15 KG/M2 | DIASTOLIC BLOOD PRESSURE: 94 MMHG | SYSTOLIC BLOOD PRESSURE: 150 MMHG | HEART RATE: 86 BPM | HEIGHT: 65 IN

## 2024-06-19 DIAGNOSIS — E11.9 TYPE 2 DIABETES MELLITUS WITHOUT COMPLICATION, WITHOUT LONG-TERM CURRENT USE OF INSULIN: ICD-10-CM

## 2024-06-19 DIAGNOSIS — L30.9 ECZEMA, UNSPECIFIED TYPE: ICD-10-CM

## 2024-06-19 DIAGNOSIS — F41.1 GAD (GENERALIZED ANXIETY DISORDER): ICD-10-CM

## 2024-06-19 DIAGNOSIS — I10 ESSENTIAL HYPERTENSION: Primary | ICD-10-CM

## 2024-06-19 LAB — HBA1C MFR BLD: 7.2 %

## 2024-06-19 PROCEDURE — 4010F ACE/ARB THERAPY RXD/TAKEN: CPT | Mod: CPTII,S$GLB,, | Performed by: FAMILY MEDICINE

## 2024-06-19 PROCEDURE — 3008F BODY MASS INDEX DOCD: CPT | Mod: CPTII,S$GLB,, | Performed by: FAMILY MEDICINE

## 2024-06-19 PROCEDURE — 83036 HEMOGLOBIN GLYCOSYLATED A1C: CPT | Mod: QW,,, | Performed by: FAMILY MEDICINE

## 2024-06-19 PROCEDURE — 99214 OFFICE O/P EST MOD 30 MIN: CPT | Mod: S$GLB,,, | Performed by: FAMILY MEDICINE

## 2024-06-19 PROCEDURE — 3066F NEPHROPATHY DOC TX: CPT | Mod: CPTII,S$GLB,, | Performed by: FAMILY MEDICINE

## 2024-06-19 PROCEDURE — 1159F MED LIST DOCD IN RCRD: CPT | Mod: CPTII,S$GLB,, | Performed by: FAMILY MEDICINE

## 2024-06-19 PROCEDURE — 3288F FALL RISK ASSESSMENT DOCD: CPT | Mod: CPTII,S$GLB,, | Performed by: FAMILY MEDICINE

## 2024-06-19 PROCEDURE — 3051F HG A1C>EQUAL 7.0%<8.0%: CPT | Mod: CPTII,S$GLB,, | Performed by: FAMILY MEDICINE

## 2024-06-19 PROCEDURE — 1101F PT FALLS ASSESS-DOCD LE1/YR: CPT | Mod: CPTII,S$GLB,, | Performed by: FAMILY MEDICINE

## 2024-06-19 PROCEDURE — 3080F DIAST BP >= 90 MM HG: CPT | Mod: CPTII,S$GLB,, | Performed by: FAMILY MEDICINE

## 2024-06-19 PROCEDURE — 1160F RVW MEDS BY RX/DR IN RCRD: CPT | Mod: CPTII,S$GLB,, | Performed by: FAMILY MEDICINE

## 2024-06-19 PROCEDURE — 3077F SYST BP >= 140 MM HG: CPT | Mod: CPTII,S$GLB,, | Performed by: FAMILY MEDICINE

## 2024-06-19 PROCEDURE — 3061F NEG MICROALBUMINURIA REV: CPT | Mod: CPTII,S$GLB,, | Performed by: FAMILY MEDICINE

## 2024-06-19 RX ORDER — AMLODIPINE BESYLATE 2.5 MG/1
2.5 TABLET ORAL DAILY
Qty: 90 TABLET | Refills: 1 | Status: CANCELLED | OUTPATIENT
Start: 2024-06-19

## 2024-06-19 RX ORDER — METFORMIN HYDROCHLORIDE 500 MG/1
500 TABLET, EXTENDED RELEASE ORAL 2 TIMES DAILY WITH MEALS
Qty: 180 TABLET | Refills: 3 | Status: SHIPPED | OUTPATIENT
Start: 2024-06-19

## 2024-06-19 RX ORDER — LOSARTAN POTASSIUM 100 MG/1
100 TABLET ORAL DAILY
Qty: 90 TABLET | Refills: 3 | Status: SHIPPED | OUTPATIENT
Start: 2024-06-19

## 2024-06-19 RX ORDER — ALPRAZOLAM 0.5 MG/1
0.5 TABLET ORAL 2 TIMES DAILY
Qty: 60 TABLET | Refills: 5 | Status: SHIPPED | OUTPATIENT
Start: 2024-06-19

## 2024-06-19 RX ORDER — CLOTRIMAZOLE AND BETAMETHASONE DIPROPIONATE 10; .64 MG/G; MG/G
CREAM TOPICAL 2 TIMES DAILY
Qty: 45 G | Refills: 1 | Status: SHIPPED | OUTPATIENT
Start: 2024-06-19

## 2024-06-19 RX ORDER — AMLODIPINE BESYLATE 2.5 MG/1
2.5 TABLET ORAL DAILY
Qty: 90 TABLET | Refills: 1 | Status: SHIPPED | OUTPATIENT
Start: 2024-06-19

## 2024-07-09 ENCOUNTER — TELEPHONE (OUTPATIENT)
Dept: FAMILY MEDICINE | Facility: CLINIC | Age: 69
End: 2024-07-09
Payer: MEDICARE

## 2024-07-09 NOTE — TELEPHONE ENCOUNTER
Pt states she does not remember if she has had her eye exam done. Pt will call to schedule an appt.

## 2024-09-03 ENCOUNTER — PATIENT MESSAGE (OUTPATIENT)
Dept: ADMINISTRATIVE | Facility: HOSPITAL | Age: 69
End: 2024-09-03
Payer: MEDICARE

## 2024-09-18 DIAGNOSIS — Z78.0 MENOPAUSE: ICD-10-CM

## 2024-09-23 ENCOUNTER — PATIENT MESSAGE (OUTPATIENT)
Dept: ADMINISTRATIVE | Facility: HOSPITAL | Age: 69
End: 2024-09-23
Payer: MEDICARE

## 2024-10-02 ENCOUNTER — PATIENT MESSAGE (OUTPATIENT)
Dept: FAMILY MEDICINE | Facility: CLINIC | Age: 69
End: 2024-10-02
Payer: MEDICARE

## 2024-10-30 ENCOUNTER — OFFICE VISIT (OUTPATIENT)
Dept: FAMILY MEDICINE | Facility: CLINIC | Age: 69
End: 2024-10-30
Payer: MEDICARE

## 2024-10-30 VITALS
WEIGHT: 239 LBS | SYSTOLIC BLOOD PRESSURE: 140 MMHG | BODY MASS INDEX: 39.82 KG/M2 | DIASTOLIC BLOOD PRESSURE: 80 MMHG | HEART RATE: 80 BPM | HEIGHT: 65 IN

## 2024-10-30 DIAGNOSIS — F41.1 GAD (GENERALIZED ANXIETY DISORDER): ICD-10-CM

## 2024-10-30 DIAGNOSIS — Z76.0 MEDICATION REFILL: ICD-10-CM

## 2024-10-30 DIAGNOSIS — M81.0 OSTEOPOROSIS, UNSPECIFIED OSTEOPOROSIS TYPE, UNSPECIFIED PATHOLOGICAL FRACTURE PRESENCE: ICD-10-CM

## 2024-10-30 DIAGNOSIS — E11.9 TYPE 2 DIABETES MELLITUS WITHOUT COMPLICATION, WITHOUT LONG-TERM CURRENT USE OF INSULIN: Primary | ICD-10-CM

## 2024-10-30 DIAGNOSIS — F43.21 GRIEVING: ICD-10-CM

## 2024-10-30 DIAGNOSIS — I10 ESSENTIAL HYPERTENSION: ICD-10-CM

## 2024-10-30 DIAGNOSIS — Z78.0 MENOPAUSE: ICD-10-CM

## 2024-10-30 DIAGNOSIS — Z12.31 OTHER SCREENING MAMMOGRAM: ICD-10-CM

## 2024-10-30 LAB — HBA1C MFR BLD: 8 %

## 2024-10-30 PROCEDURE — 3052F HG A1C>EQUAL 8.0%<EQUAL 9.0%: CPT | Mod: CPTII,S$GLB,, | Performed by: FAMILY MEDICINE

## 2024-10-30 PROCEDURE — 99214 OFFICE O/P EST MOD 30 MIN: CPT | Mod: S$GLB,,, | Performed by: FAMILY MEDICINE

## 2024-10-30 PROCEDURE — 1160F RVW MEDS BY RX/DR IN RCRD: CPT | Mod: CPTII,S$GLB,, | Performed by: FAMILY MEDICINE

## 2024-10-30 PROCEDURE — 83036 HEMOGLOBIN GLYCOSYLATED A1C: CPT | Mod: QW,,, | Performed by: FAMILY MEDICINE

## 2024-10-30 PROCEDURE — 3066F NEPHROPATHY DOC TX: CPT | Mod: CPTII,S$GLB,, | Performed by: FAMILY MEDICINE

## 2024-10-30 PROCEDURE — 1159F MED LIST DOCD IN RCRD: CPT | Mod: CPTII,S$GLB,, | Performed by: FAMILY MEDICINE

## 2024-10-30 PROCEDURE — 3008F BODY MASS INDEX DOCD: CPT | Mod: CPTII,S$GLB,, | Performed by: FAMILY MEDICINE

## 2024-10-30 PROCEDURE — 1101F PT FALLS ASSESS-DOCD LE1/YR: CPT | Mod: CPTII,S$GLB,, | Performed by: FAMILY MEDICINE

## 2024-10-30 PROCEDURE — 3061F NEG MICROALBUMINURIA REV: CPT | Mod: CPTII,S$GLB,, | Performed by: FAMILY MEDICINE

## 2024-10-30 PROCEDURE — 3079F DIAST BP 80-89 MM HG: CPT | Mod: CPTII,S$GLB,, | Performed by: FAMILY MEDICINE

## 2024-10-30 PROCEDURE — 3077F SYST BP >= 140 MM HG: CPT | Mod: CPTII,S$GLB,, | Performed by: FAMILY MEDICINE

## 2024-10-30 PROCEDURE — 3288F FALL RISK ASSESSMENT DOCD: CPT | Mod: CPTII,S$GLB,, | Performed by: FAMILY MEDICINE

## 2024-10-30 PROCEDURE — 4010F ACE/ARB THERAPY RXD/TAKEN: CPT | Mod: CPTII,S$GLB,, | Performed by: FAMILY MEDICINE

## 2024-10-30 RX ORDER — METFORMIN HYDROCHLORIDE 500 MG/1
500 TABLET, EXTENDED RELEASE ORAL 2 TIMES DAILY WITH MEALS
Qty: 180 TABLET | Refills: 3 | Status: SHIPPED | OUTPATIENT
Start: 2024-10-30

## 2024-10-30 RX ORDER — LOSARTAN POTASSIUM 100 MG/1
100 TABLET ORAL DAILY
Qty: 90 TABLET | Refills: 3 | Status: SHIPPED | OUTPATIENT
Start: 2024-10-30

## 2024-10-30 RX ORDER — ALPRAZOLAM 0.5 MG/1
0.5 TABLET ORAL 2 TIMES DAILY
Qty: 60 TABLET | Refills: 5 | Status: SHIPPED | OUTPATIENT
Start: 2024-10-30

## 2024-10-30 RX ORDER — GLIMEPIRIDE 2 MG/1
2 TABLET ORAL 2 TIMES DAILY
Qty: 180 TABLET | Refills: 3 | Status: SHIPPED | OUTPATIENT
Start: 2024-10-30

## 2024-10-30 RX ORDER — CLOTRIMAZOLE AND BETAMETHASONE DIPROPIONATE 10; .64 MG/G; MG/G
CREAM TOPICAL 2 TIMES DAILY
Qty: 45 G | Refills: 1 | Status: SHIPPED | OUTPATIENT
Start: 2024-10-30

## 2024-10-30 RX ORDER — AMLODIPINE BESYLATE 2.5 MG/1
2.5 TABLET ORAL DAILY
Qty: 90 TABLET | Refills: 3 | Status: SHIPPED | OUTPATIENT
Start: 2024-10-30

## 2024-11-20 ENCOUNTER — TELEPHONE (OUTPATIENT)
Dept: FAMILY MEDICINE | Facility: CLINIC | Age: 69
End: 2024-11-20
Payer: MEDICARE

## 2024-11-20 NOTE — TELEPHONE ENCOUNTER
Spoke with pt to inform her she's due for eye exam and colorectal cancer screening.pt refused both

## 2024-12-04 ENCOUNTER — HOSPITAL ENCOUNTER (OUTPATIENT)
Dept: RADIOLOGY | Facility: HOSPITAL | Age: 69
Discharge: HOME | End: 2024-12-04
Attending: FAMILY MEDICINE
Payer: MEDICARE

## 2024-12-04 VITALS — BODY MASS INDEX: 39.65 KG/M2 | HEIGHT: 65 IN | WEIGHT: 238 LBS

## 2024-12-04 DIAGNOSIS — Z78.0 MENOPAUSE: ICD-10-CM

## 2024-12-04 DIAGNOSIS — M81.0 OSTEOPOROSIS, UNSPECIFIED OSTEOPOROSIS TYPE, UNSPECIFIED PATHOLOGICAL FRACTURE PRESENCE: ICD-10-CM

## 2024-12-04 DIAGNOSIS — Z12.31 OTHER SCREENING MAMMOGRAM: ICD-10-CM

## 2024-12-04 PROCEDURE — 77080 DXA BONE DENSITY AXIAL: CPT | Mod: 26,,, | Performed by: RADIOLOGY

## 2024-12-04 PROCEDURE — 77063 BREAST TOMOSYNTHESIS BI: CPT | Mod: 26,,, | Performed by: RADIOLOGY

## 2024-12-04 PROCEDURE — 77080 DXA BONE DENSITY AXIAL: CPT | Mod: TC,PO

## 2024-12-04 PROCEDURE — 77067 SCR MAMMO BI INCL CAD: CPT | Mod: 26,,, | Performed by: RADIOLOGY

## 2024-12-04 PROCEDURE — 77063 BREAST TOMOSYNTHESIS BI: CPT | Mod: TC,PO

## 2025-01-26 NOTE — PROGRESS NOTES
SUBJECTIVE:    Patient ID: Della Juarez is a 69 y.o. female.    Chief Complaint: Follow-up (4 mo f/u//no med bottles//declined colonoscopy, last eye exam one year ago but does not remember w/who and soes not want a referral//foot exam ordered//A1C ordered//tc)      Pt here to checkup on acute and chronic conditions.    A1c is 7.2%. no longer eating chocolate.  Has not been taking her BS a lot, hates to stick herself.  Her stomach gives her problems when she eats late and takes her medication late. States he stomach can't handle eating at different times and she gets constipated or gets a blowout. Doesn't think it is from the metformin as if she takes it at a regular time daily she doesn't have these problems.     BP is doing good today.  Denies CP/AH/SOB. Weight is stable.      Anxiety is doing ok. Work still bothers her.  Has her depressed moments every once in awhile.     No labs done.        --------------------------------------------------------  Mammogram nl, 2024; DEXA  nl.                      Due for cscope.  Eye exams by (Vibha)  Pt not taking statin due to not wanting to in preference to working on diet and exercise.            Office Visit on 2025   Component Date Value Ref Range Status    Hemoglobin A1C, POC 2025 7.2  % Final   Office Visit on 10/30/2024   Component Date Value Ref Range Status    Hemoglobin A1C, POC 10/30/2024 8.0  % Final       Past Medical History:   Diagnosis Date    Diabetes mellitus     Hypertension     Mental disorder     Anxiety     Social History     Socioeconomic History    Marital status:    Tobacco Use    Smoking status: Former     Current packs/day: 0.00     Average packs/day: 0.5 packs/day for 11.0 years (5.5 ttl pk-yrs)     Types: Cigarettes     Start date:      Quit date: 2016     Years since quittin.0     Passive exposure: Never    Smokeless tobacco: Never   Substance and Sexual Activity    Alcohol use: No    Drug use: No    Sexual  activity: Not Currently     Social Drivers of Health     Financial Resource Strain: Patient Declined (2024)    Overall Financial Resource Strain (CARDIA)     Difficulty of Paying Living Expenses: Patient declined   Food Insecurity: Patient Declined (2024)    Hunger Vital Sign     Worried About Running Out of Food in the Last Year: Patient declined     Ran Out of Food in the Last Year: Patient declined   Physical Activity: Unknown (2024)    Exercise Vital Sign     Days of Exercise per Week: 4 days   Stress: Stress Concern Present (2024)    [employer] - Occupational Stress Questionnaire     Feeling of Stress : To some extent   Housing Stability: Unknown (2024)    Housing Stability Vital Sign     Unable to Pay for Housing in the Last Year: Patient declined     Past Surgical History:   Procedure Laterality Date     SECTION      x3    COSMETIC SURGERY      Scar on right side of face     Family History   Problem Relation Name Age of Onset    Hemochromatosis Mother      Hypertension Mother      Stroke Mother      Diabetes Father      Hypertension Father      Breast cancer Paternal Aunt      Diabetes Maternal Grandmother      Hypertension Maternal Grandmother      Stroke Maternal Grandmother      Cancer Neg Hx      Colon cancer Neg Hx      Ovarian cancer Neg Hx      Eclampsia Neg Hx      Miscarriages / Stillbirths Neg Hx       labor Neg Hx         Review of patient's allergies indicates:   Allergen Reactions    Penicillins      Other reaction(s): Vomiting  Other reaction(s): Swelling  Other reaction(s): Nausea  Other reaction(s): Hives       Current Outpatient Medications:     ascorbic acid, vitamin C, (VITAMIN C) 500 MG tablet, Take 500 mg by mouth once daily., Disp: , Rfl:     aspirin (ECOTRIN) 81 MG EC tablet, Take 1 tablet (81 mg total) by mouth once daily., Disp: , Rfl: 0    blood sugar diagnostic Strp, To check BG two times daily, to use with  insurance preferred meter, Disp: 200 each, Rfl: 1    blood-glucose meter kit, To check BG two times daily, to use with insurance preferred meter, Disp: 1 each, Rfl: 0    clotrimazole-betamethasone 1-0.05% (LOTRISONE) cream, Apply topically 2 (two) times daily., Disp: 45 g, Rfl: 1    cyanocobalamin 2000 MCG tablet, Take 2,000 mcg by mouth once daily., Disp: , Rfl:     lancets Misc, To check BG two times daily, to use with insurance preferred meter, Disp: 200 each, Rfl: 1    multivitamin capsule, Take 1 capsule by mouth once daily., Disp: , Rfl:     vitamin D (VITAMIN D3) 1000 units Tab, Take 5,000 Units by mouth once daily., Disp: , Rfl:     zinc gluconate 50 mg tablet, Take 50 mg by mouth once daily., Disp: , Rfl:     ALPRAZolam (XANAX) 0.5 MG tablet, Take 1 tablet (0.5 mg total) by mouth 2 (two) times daily., Disp: 60 tablet, Rfl: 5    amLODIPine (NORVASC) 2.5 MG tablet, Take 1 tablet (2.5 mg total) by mouth once daily., Disp: 90 tablet, Rfl: 3    glimepiride (AMARYL) 2 MG tablet, Take 1 tablet (2 mg total) by mouth 2 (two) times a day., Disp: 180 tablet, Rfl: 3    losartan (COZAAR) 100 MG tablet, Take 1 tablet (100 mg total) by mouth once daily., Disp: 90 tablet, Rfl: 3    metFORMIN (GLUCOPHAGE-XR) 500 MG ER 24hr tablet, Take 1 tablet (500 mg total) by mouth 2 (two) times daily with meals., Disp: 180 tablet, Rfl: 3    Review of Systems   Constitutional:  Positive for activity change. Negative for appetite change, fatigue, fever and unexpected weight change.   HENT:  Negative for hearing loss, rhinorrhea and trouble swallowing.    Eyes:  Negative for discharge and visual disturbance.   Respiratory:  Negative for cough, chest tightness, shortness of breath and wheezing.    Cardiovascular:  Negative for chest pain, palpitations and leg swelling.   Gastrointestinal:  Negative for abdominal pain, blood in stool, constipation, diarrhea, nausea and vomiting.        -heartburn   Endocrine: Negative for polydipsia and  "polyuria.   Genitourinary:  Negative for difficulty urinating, dysuria, frequency, hematuria, menstrual problem and urgency.   Musculoskeletal:  Negative for arthralgias, back pain, joint swelling, myalgias and neck pain.   Skin:  Negative for rash.   Neurological:  Negative for dizziness, weakness, numbness and headaches.   Hematological:  Does not bruise/bleed easily.   Psychiatric/Behavioral:  Negative for confusion, dysphoric mood, sleep disturbance and suicidal ideas. The patient is nervous/anxious.    All other systems reviewed and are negative.         Objective:      Vitals:    01/29/25 1144   BP: 122/84   Pulse: 84   SpO2: 96%   Weight: 106.6 kg (235 lb)   Height: 5' 4.5" (1.638 m)             Wt Readings from Last 6 Encounters:   01/29/25 106.6 kg (235 lb)   12/04/24 108 kg (238 lb)   10/30/24 108.4 kg (239 lb)   06/19/24 106.6 kg (235 lb)   02/01/24 108 kg (238 lb)   11/01/23 109.4 kg (241 lb 3.2 oz)         Physical Exam  Vitals reviewed.   Constitutional:       General: She is not in acute distress.     Appearance: Normal appearance. She is well-developed.      Comments: Obese     HENT:      Head: Normocephalic and atraumatic.   Neck:      Thyroid: No thyromegaly.   Cardiovascular:      Rate and Rhythm: Normal rate and regular rhythm.      Heart sounds: Normal heart sounds. No murmur heard.     No friction rub.   Pulmonary:      Effort: Pulmonary effort is normal.      Breath sounds: Normal breath sounds. No wheezing or rales.   Abdominal:      General: Bowel sounds are normal. There is no distension.      Palpations: Abdomen is soft.      Tenderness: There is no abdominal tenderness.   Musculoskeletal:      Cervical back: Neck supple.      Right lower leg: No swelling. No edema.   Lymphadenopathy:      Cervical: No cervical adenopathy.   Skin:     General: Skin is warm and dry.      Findings: No rash.   Neurological:      Mental Status: She is alert and oriented to person, place, and time. "   Psychiatric:         Attention and Perception: She is attentive.         Speech: Speech normal.         Behavior: Behavior normal.         Thought Content: Thought content normal.         Judgment: Judgment normal.           Assessment:       1. Type 2 diabetes mellitus without complication, without long-term current use of insulin    2. Essential hypertension    3. MELODY (generalized anxiety disorder)    4. Long-term use of high-risk medication                 Plan:       Type 2 diabetes mellitus without complication, without long-term current use of insulin  Comments:  Improved. A1c 7.2%. To continue ADA diet,  regular BS checks, regular exercise. Will continue to monitor A1c.  Orders:  -     POCT HEMOGLOBIN A1C  -     glimepiride (AMARYL) 2 MG tablet; Take 1 tablet (2 mg total) by mouth 2 (two) times a day.  Dispense: 180 tablet; Refill: 3  -     metFORMIN (GLUCOPHAGE-XR) 500 MG ER 24hr tablet; Take 1 tablet (500 mg total) by mouth 2 (two) times daily with meals.  Dispense: 180 tablet; Refill: 3  -     TSH w/reflex to FT4; Future; Expected date: 01/29/2025  -     Urinalysis, Reflex to Urine Culture Urine, Clean Catch; Future; Expected date: 01/29/2025  -     CBC Auto Differential; Future; Expected date: 01/29/2025  -     Lipid Panel; Future; Expected date: 01/29/2025  -     Comprehensive Metabolic Panel; Future; Expected date: 01/29/2025  -     Microalbumin/Creatinine Ratio, Urine; Future; Expected date: 01/29/2025  -     Hemoglobin A1C; Future; Expected date: 01/29/2025    Essential hypertension  Comments:  Controlled. Will continue to monitor BP on current medication regimen  Orders:  -     amLODIPine (NORVASC) 2.5 MG tablet; Take 1 tablet (2.5 mg total) by mouth once daily.  Dispense: 90 tablet; Refill: 3  -     losartan (COZAAR) 100 MG tablet; Take 1 tablet (100 mg total) by mouth once daily.  Dispense: 90 tablet; Refill: 3    MELODY (generalized anxiety disorder)  Comments:  Controlled. Will continue to monitor  symptoms.  Orders:  -     ALPRAZolam (XANAX) 0.5 MG tablet; Take 1 tablet (0.5 mg total) by mouth 2 (two) times daily.  Dispense: 60 tablet; Refill: 5    Long-term use of high-risk medication  -     TSH w/reflex to FT4; Future; Expected date: 01/29/2025  -     Urinalysis, Reflex to Urine Culture Urine, Clean Catch; Future; Expected date: 01/29/2025  -     CBC Auto Differential; Future; Expected date: 01/29/2025  -     Lipid Panel; Future; Expected date: 01/29/2025  -     Comprehensive Metabolic Panel; Future; Expected date: 01/29/2025  -     Microalbumin/Creatinine Ratio, Urine; Future; Expected date: 01/29/2025  -     Hemoglobin A1C; Future; Expected date: 01/29/2025        Labs and/or tests have been ordered for the evaluation/monitoring of acute/chronic conditions, to be done just before next visit.    Follow up in about 4 months (around 5/29/2025) for DM, HTN, Anxiety.        1/29/2025 Fatuma Hollins

## 2025-01-29 ENCOUNTER — OFFICE VISIT (OUTPATIENT)
Dept: FAMILY MEDICINE | Facility: CLINIC | Age: 70
End: 2025-01-29
Payer: MEDICARE

## 2025-01-29 VITALS
DIASTOLIC BLOOD PRESSURE: 84 MMHG | BODY MASS INDEX: 39.15 KG/M2 | WEIGHT: 235 LBS | HEART RATE: 84 BPM | SYSTOLIC BLOOD PRESSURE: 122 MMHG | OXYGEN SATURATION: 96 % | HEIGHT: 65 IN

## 2025-01-29 DIAGNOSIS — I10 ESSENTIAL HYPERTENSION: ICD-10-CM

## 2025-01-29 DIAGNOSIS — Z79.899 LONG-TERM USE OF HIGH-RISK MEDICATION: ICD-10-CM

## 2025-01-29 DIAGNOSIS — F41.1 GAD (GENERALIZED ANXIETY DISORDER): ICD-10-CM

## 2025-01-29 DIAGNOSIS — E11.9 TYPE 2 DIABETES MELLITUS WITHOUT COMPLICATION, WITHOUT LONG-TERM CURRENT USE OF INSULIN: Primary | ICD-10-CM

## 2025-01-29 LAB — HBA1C MFR BLD: 7.2 %

## 2025-01-29 PROCEDURE — 3079F DIAST BP 80-89 MM HG: CPT | Mod: CPTII,S$GLB,, | Performed by: FAMILY MEDICINE

## 2025-01-29 PROCEDURE — 3288F FALL RISK ASSESSMENT DOCD: CPT | Mod: CPTII,S$GLB,, | Performed by: FAMILY MEDICINE

## 2025-01-29 PROCEDURE — 1160F RVW MEDS BY RX/DR IN RCRD: CPT | Mod: CPTII,S$GLB,, | Performed by: FAMILY MEDICINE

## 2025-01-29 PROCEDURE — 3008F BODY MASS INDEX DOCD: CPT | Mod: CPTII,S$GLB,, | Performed by: FAMILY MEDICINE

## 2025-01-29 PROCEDURE — 1159F MED LIST DOCD IN RCRD: CPT | Mod: CPTII,S$GLB,, | Performed by: FAMILY MEDICINE

## 2025-01-29 PROCEDURE — 4010F ACE/ARB THERAPY RXD/TAKEN: CPT | Mod: CPTII,S$GLB,, | Performed by: FAMILY MEDICINE

## 2025-01-29 PROCEDURE — 83036 HEMOGLOBIN GLYCOSYLATED A1C: CPT | Mod: QW,,, | Performed by: FAMILY MEDICINE

## 2025-01-29 PROCEDURE — 1101F PT FALLS ASSESS-DOCD LE1/YR: CPT | Mod: CPTII,S$GLB,, | Performed by: FAMILY MEDICINE

## 2025-01-29 PROCEDURE — 3051F HG A1C>EQUAL 7.0%<8.0%: CPT | Mod: CPTII,S$GLB,, | Performed by: FAMILY MEDICINE

## 2025-01-29 PROCEDURE — 3074F SYST BP LT 130 MM HG: CPT | Mod: CPTII,S$GLB,, | Performed by: FAMILY MEDICINE

## 2025-01-29 PROCEDURE — 99214 OFFICE O/P EST MOD 30 MIN: CPT | Mod: S$GLB,,, | Performed by: FAMILY MEDICINE

## 2025-01-29 RX ORDER — ALPRAZOLAM 0.5 MG/1
0.5 TABLET ORAL 2 TIMES DAILY
Qty: 60 TABLET | Refills: 5 | Status: SHIPPED | OUTPATIENT
Start: 2025-01-29

## 2025-01-29 RX ORDER — AMLODIPINE BESYLATE 2.5 MG/1
2.5 TABLET ORAL DAILY
Qty: 90 TABLET | Refills: 3 | Status: SHIPPED | OUTPATIENT
Start: 2025-01-29

## 2025-01-29 RX ORDER — LOSARTAN POTASSIUM 100 MG/1
100 TABLET ORAL DAILY
Qty: 90 TABLET | Refills: 3 | Status: SHIPPED | OUTPATIENT
Start: 2025-01-29

## 2025-01-29 RX ORDER — METFORMIN HYDROCHLORIDE 500 MG/1
500 TABLET, EXTENDED RELEASE ORAL 2 TIMES DAILY WITH MEALS
Qty: 180 TABLET | Refills: 3 | Status: SHIPPED | OUTPATIENT
Start: 2025-01-29

## 2025-01-29 RX ORDER — GLIMEPIRIDE 2 MG/1
2 TABLET ORAL 2 TIMES DAILY
Qty: 180 TABLET | Refills: 3 | Status: SHIPPED | OUTPATIENT
Start: 2025-01-29

## 2025-02-07 ENCOUNTER — TELEPHONE (OUTPATIENT)
Dept: FAMILY MEDICINE | Facility: CLINIC | Age: 70
End: 2025-02-07
Payer: MEDICARE

## 2025-02-07 NOTE — TELEPHONE ENCOUNTER
"Spoke with pt states Dr. Hollins was going to do research about a letter pt is needing fro accommodations from her employer. States she needs the letter to say something about needing a schedule so she can eat meals and take meds at appropriate times.   States she gets off work to late sometimes to cook herself dinner and eat at a reasonable  time. When she eats late and takes her meds late is causes constipation or "blow outs "  Advised pt I will give her a call Monday with an update of this letter.   "

## 2025-02-07 NOTE — TELEPHONE ENCOUNTER
----- Message from Pearl sent at 2/7/2025 10:51 AM CST -----  This is for Lily. The patient said she was waiting for a call from you. Its about a letter from Dr. Hollins..  Pt's # [phone] GH

## 2025-02-11 NOTE — LETTER
[address]  [place] [address]  Phone: [phone]   [phone]                        MD Fatuma Strong MD Chequita Williams, MD Matthew Bassett, PA-C Linda Melerine, NP Jodi Powell, NP Jennifer Shields, DANIS      Date: 02/11/2025        Patient: Della Juarez  YOB: 1955      To Whom It May Concern:    The purpose of this letter is to state that Ms. Della Juarez could benefit from an accommodation for her diabetes under the Americans with Disabilities Act. She has reported trouble maintaining her blood sugar throughout the day due to not being able to eat at appropriate intervals.  Apparently her current work schedule can sometimes interfere with her ability to consume a main meal at the proper time depending on her shift.  Her main meals should be consumed no longer than 4-5 hours apart.  Please assist Ms. Juarez in devising a schedule that will make it a safe environment for her to work with her diabetes.    Thank you for your time and consideration in this matter.     Sincerely,         Fatuma Hollins MD

## 2025-02-17 ENCOUNTER — PATIENT MESSAGE (OUTPATIENT)
Dept: ADMINISTRATIVE | Facility: HOSPITAL | Age: 70
End: 2025-02-17
Payer: MEDICARE

## 2025-05-14 ENCOUNTER — TELEPHONE (OUTPATIENT)
Dept: FAMILY MEDICINE | Facility: CLINIC | Age: 70
End: 2025-05-14
Payer: MEDICARE

## 2025-05-20 ENCOUNTER — TELEPHONE (OUTPATIENT)
Dept: FAMILY MEDICINE | Facility: CLINIC | Age: 70
End: 2025-05-20
Payer: MEDICARE

## 2025-05-20 NOTE — TELEPHONE ENCOUNTER
----- Message from Flora sent at 5/20/2025  3:18 PM CDT -----  Patient states she has never done a 12 hour fast usually it is 8 hours. Patient is just double checking on this since she is a diabetic. [phone]

## 2025-05-21 ENCOUNTER — TELEPHONE (OUTPATIENT)
Dept: FAMILY MEDICINE | Facility: CLINIC | Age: 70
End: 2025-05-21
Payer: MEDICARE

## 2025-05-21 NOTE — TELEPHONE ENCOUNTER
----- Message from Flora sent at 5/21/2025  8:58 AM CDT -----  Patient states that she is at [facility] and [facility] states that they have no orders available for patient. [facility] [address] in [place]. [phone]

## 2025-05-23 ENCOUNTER — PATIENT MESSAGE (OUTPATIENT)
Dept: ADMINISTRATIVE | Facility: HOSPITAL | Age: 70
End: 2025-05-23
Payer: MEDICARE

## 2025-06-06 ENCOUNTER — OFFICE VISIT (OUTPATIENT)
Dept: FAMILY MEDICINE | Facility: CLINIC | Age: 70
End: 2025-06-06
Payer: MEDICARE

## 2025-06-06 VITALS
OXYGEN SATURATION: 97 % | HEIGHT: 65 IN | WEIGHT: 227.38 LBS | HEART RATE: 88 BPM | SYSTOLIC BLOOD PRESSURE: 132 MMHG | BODY MASS INDEX: 37.88 KG/M2 | DIASTOLIC BLOOD PRESSURE: 68 MMHG

## 2025-06-06 DIAGNOSIS — E78.2 MIXED HYPERLIPIDEMIA: ICD-10-CM

## 2025-06-06 DIAGNOSIS — G47.00 TROUBLE STAYING ASLEEP: ICD-10-CM

## 2025-06-06 DIAGNOSIS — E11.9 TYPE 2 DIABETES MELLITUS WITHOUT COMPLICATION, WITHOUT LONG-TERM CURRENT USE OF INSULIN: Primary | ICD-10-CM

## 2025-06-06 DIAGNOSIS — F41.1 GAD (GENERALIZED ANXIETY DISORDER): ICD-10-CM

## 2025-06-06 DIAGNOSIS — I10 ESSENTIAL HYPERTENSION: ICD-10-CM

## 2025-07-19 ENCOUNTER — HOSPITAL ENCOUNTER (INPATIENT)
Facility: HOSPITAL | Age: 70
LOS: 2 days | Discharge: HOME | DRG: 066 | End: 2025-07-21
Attending: EMERGENCY MEDICINE | Admitting: STUDENT IN AN ORGANIZED HEALTH CARE EDUCATION/TRAINING PROGRAM
Payer: MEDICARE

## 2025-07-19 DIAGNOSIS — I63.9 CVA (CEREBRAL VASCULAR ACCIDENT): ICD-10-CM

## 2025-07-19 DIAGNOSIS — R29.818 ACUTE FOCAL NEUROLOGICAL DEFICIT: ICD-10-CM

## 2025-07-19 DIAGNOSIS — I10 HYPERTENSION: ICD-10-CM

## 2025-07-19 DIAGNOSIS — R07.9 CHEST PAIN: ICD-10-CM

## 2025-07-19 PROBLEM — E11.9 TYPE 2 DIABETES MELLITUS, WITHOUT LONG-TERM CURRENT USE OF INSULIN: Status: ACTIVE | Noted: 2025-07-19

## 2025-07-19 LAB
ABSOLUTE EOSINOPHIL (SMH): 0.06 K/UL
ABSOLUTE MONOCYTE (SMH): 1.02 K/UL (ref 0.3–1)
ABSOLUTE NEUTROPHIL COUNT (SMH): 9.8 K/UL (ref 1.8–7.7)
ALBUMIN SERPL-MCNC: 4.2 G/DL (ref 3.5–5.2)
ALP SERPL-CCNC: 67 UNIT/L (ref 55–135)
ALT SERPL-CCNC: 36 UNIT/L (ref 10–44)
ANION GAP (SMH): 10 MMOL/L (ref 8–16)
AST SERPL-CCNC: 27 UNIT/L (ref 10–40)
BASOPHILS # BLD AUTO: 0.05 K/UL
BASOPHILS NFR BLD AUTO: 0.4 %
BILIRUB SERPL-MCNC: 0.5 MG/DL (ref 0.1–1)
BUN SERPL-MCNC: 21 MG/DL (ref 8–23)
CALCIUM SERPL-MCNC: 9.6 MG/DL (ref 8.7–10.5)
CHLORIDE SERPL-SCNC: 108 MMOL/L (ref 95–110)
CHOLEST SERPL-MCNC: 226 MG/DL (ref 120–199)
CHOLEST/HDLC SERPL: 3.7 {RATIO} (ref 2–5)
CO2 SERPL-SCNC: 23 MMOL/L (ref 23–29)
CREAT SERPL-MCNC: 0.9 MG/DL (ref 0.5–1.4)
ERYTHROCYTE [DISTWIDTH] IN BLOOD BY AUTOMATED COUNT: 13.4 % (ref 11.5–14.5)
GFR SERPLBLD CREATININE-BSD FMLA CKD-EPI: >60 ML/MIN/1.73/M2
GLUCOSE SERPL-MCNC: 123 MG/DL (ref 70–110)
HCT VFR BLD AUTO: 45 % (ref 37–48.5)
HDLC SERPL-MCNC: 61 MG/DL (ref 40–75)
HDLC SERPL: 27 % (ref 20–50)
HGB BLD-MCNC: 15.3 GM/DL (ref 12–16)
IMM GRANULOCYTES # BLD AUTO: 0.05 K/UL (ref 0–0.04)
IMM GRANULOCYTES NFR BLD AUTO: 0.4 % (ref 0–0.5)
INR PPP: 1.1 (ref 0.8–1.2)
LDLC SERPL CALC-MCNC: 145.6 MG/DL (ref 63–159)
LYMPHOCYTES # BLD AUTO: 1.95 K/UL (ref 1–4.8)
MCH RBC QN AUTO: 31.2 PG (ref 27–31)
MCHC RBC AUTO-ENTMCNC: 34 G/DL (ref 32–36)
MCV RBC AUTO: 92 FL (ref 82–98)
NONHDLC SERPL-MCNC: 165 MG/DL
NUCLEATED RBC (/100WBC) (SMH): 0 /100 WBC
PLATELET # BLD AUTO: 259 K/UL (ref 150–450)
PMV BLD AUTO: 10.5 FL (ref 9.2–12.9)
POCT GLUCOSE: 205 MG/DL (ref 70–110)
POTASSIUM SERPL-SCNC: 4.2 MMOL/L (ref 3.5–5.1)
PROT SERPL-MCNC: 6.8 GM/DL (ref 6–8.4)
PROTHROMBIN TIME: 11.7 SECONDS (ref 9–12.5)
RBC # BLD AUTO: 4.91 M/UL (ref 4–5.4)
RELATIVE EOSINOPHIL (SMH): 0.5 % (ref 0–8)
RELATIVE LYMPHOCYTE (SMH): 15.1 % (ref 18–48)
RELATIVE MONOCYTE (SMH): 7.9 % (ref 4–15)
RELATIVE NEUTROPHIL (SMH): 75.7 % (ref 38–73)
SODIUM SERPL-SCNC: 141 MMOL/L (ref 136–145)
TRIGL SERPL-MCNC: 97 MG/DL (ref 30–150)
TSH SERPL-ACNC: 3.23 UIU/ML (ref 0.34–5.6)
WBC # BLD AUTO: 12.88 K/UL (ref 3.9–12.7)

## 2025-07-19 PROCEDURE — 93005 ELECTROCARDIOGRAM TRACING: CPT | Performed by: INTERNAL MEDICINE

## 2025-07-19 PROCEDURE — 99285 EMERGENCY DEPT VISIT HI MDM: CPT | Mod: 25

## 2025-07-19 PROCEDURE — 93010 ELECTROCARDIOGRAM REPORT: CPT | Mod: ,,, | Performed by: INTERNAL MEDICINE

## 2025-07-19 PROCEDURE — 99447 NTRPROF PH1/NTRNET/EHR 11-20: CPT | Mod: ,,, | Performed by: PSYCHIATRY & NEUROLOGY

## 2025-07-19 PROCEDURE — 80061 LIPID PANEL: CPT | Performed by: EMERGENCY MEDICINE

## 2025-07-19 PROCEDURE — 84443 ASSAY THYROID STIM HORMONE: CPT | Performed by: EMERGENCY MEDICINE

## 2025-07-19 PROCEDURE — 85610 PROTHROMBIN TIME: CPT | Performed by: EMERGENCY MEDICINE

## 2025-07-19 PROCEDURE — 80053 COMPREHEN METABOLIC PANEL: CPT | Performed by: EMERGENCY MEDICINE

## 2025-07-19 PROCEDURE — 11000001 HC ACUTE MED/SURG PRIVATE ROOM

## 2025-07-19 PROCEDURE — 25000003 PHARM REV CODE 250: Performed by: EMERGENCY MEDICINE

## 2025-07-19 PROCEDURE — 85025 COMPLETE CBC W/AUTO DIFF WBC: CPT | Performed by: EMERGENCY MEDICINE

## 2025-07-19 RX ORDER — SODIUM,POTASSIUM PHOSPHATES 280-250MG
2 POWDER IN PACKET (EA) ORAL
Status: DISCONTINUED | OUTPATIENT
Start: 2025-07-19 | End: 2025-07-21 | Stop reason: HOSPADM

## 2025-07-19 RX ORDER — ATORVASTATIN CALCIUM 40 MG/1
40 TABLET, FILM COATED ORAL NIGHTLY
Status: DISCONTINUED | OUTPATIENT
Start: 2025-07-19 | End: 2025-07-19

## 2025-07-19 RX ORDER — ATORVASTATIN CALCIUM 40 MG/1
40 TABLET, FILM COATED ORAL NIGHTLY
Status: DISCONTINUED | OUTPATIENT
Start: 2025-07-19 | End: 2025-07-21 | Stop reason: HOSPADM

## 2025-07-19 RX ORDER — SODIUM CHLORIDE 0.9 % (FLUSH) 0.9 %
10 SYRINGE (ML) INJECTION
Status: DISCONTINUED | OUTPATIENT
Start: 2025-07-19 | End: 2025-07-21 | Stop reason: HOSPADM

## 2025-07-19 RX ORDER — GLUCAGON 1 MG
1 KIT INJECTION
Status: DISCONTINUED | OUTPATIENT
Start: 2025-07-19 | End: 2025-07-21 | Stop reason: HOSPADM

## 2025-07-19 RX ORDER — LOSARTAN POTASSIUM 50 MG/1
100 TABLET ORAL DAILY
Status: DISCONTINUED | OUTPATIENT
Start: 2025-07-20 | End: 2025-07-21 | Stop reason: HOSPADM

## 2025-07-19 RX ORDER — LANOLIN ALCOHOL/MO/W.PET/CERES
800 CREAM (GRAM) TOPICAL
Status: DISCONTINUED | OUTPATIENT
Start: 2025-07-19 | End: 2025-07-21 | Stop reason: HOSPADM

## 2025-07-19 RX ORDER — LABETALOL HYDROCHLORIDE 5 MG/ML
10 INJECTION, SOLUTION INTRAVENOUS EVERY 6 HOURS PRN
Status: DISCONTINUED | OUTPATIENT
Start: 2025-07-19 | End: 2025-07-21 | Stop reason: HOSPADM

## 2025-07-19 RX ORDER — ACETAMINOPHEN 325 MG/1
650 TABLET ORAL EVERY 4 HOURS PRN
Status: DISCONTINUED | OUTPATIENT
Start: 2025-07-19 | End: 2025-07-21 | Stop reason: HOSPADM

## 2025-07-19 RX ORDER — METFORMIN HYDROCHLORIDE 500 MG/1
500 TABLET, EXTENDED RELEASE ORAL 2 TIMES DAILY WITH MEALS
Status: DISCONTINUED | OUTPATIENT
Start: 2025-07-20 | End: 2025-07-21 | Stop reason: HOSPADM

## 2025-07-19 RX ORDER — IBUPROFEN 200 MG
1 TABLET ORAL DAILY PRN
Status: DISCONTINUED | OUTPATIENT
Start: 2025-07-19 | End: 2025-07-21 | Stop reason: HOSPADM

## 2025-07-19 RX ORDER — ONDANSETRON HYDROCHLORIDE 2 MG/ML
4 INJECTION, SOLUTION INTRAVENOUS EVERY 8 HOURS PRN
Status: DISCONTINUED | OUTPATIENT
Start: 2025-07-19 | End: 2025-07-21 | Stop reason: HOSPADM

## 2025-07-19 RX ORDER — ASPIRIN 325 MG
325 TABLET ORAL
Status: COMPLETED | OUTPATIENT
Start: 2025-07-19 | End: 2025-07-19

## 2025-07-19 RX ORDER — PRAVASTATIN SODIUM 40 MG/1
80 TABLET ORAL NIGHTLY
Status: DISCONTINUED | OUTPATIENT
Start: 2025-07-19 | End: 2025-07-19

## 2025-07-19 RX ORDER — ASPIRIN 81 MG/1
81 TABLET ORAL DAILY
Status: DISCONTINUED | OUTPATIENT
Start: 2025-07-20 | End: 2025-07-21 | Stop reason: HOSPADM

## 2025-07-19 RX ORDER — IBUPROFEN 200 MG
16 TABLET ORAL
Status: DISCONTINUED | OUTPATIENT
Start: 2025-07-19 | End: 2025-07-21 | Stop reason: HOSPADM

## 2025-07-19 RX ORDER — AMOXICILLIN 250 MG
1 CAPSULE ORAL 2 TIMES DAILY PRN
Status: DISCONTINUED | OUTPATIENT
Start: 2025-07-19 | End: 2025-07-21 | Stop reason: HOSPADM

## 2025-07-19 RX ORDER — NALOXONE HCL 0.4 MG/ML
0.02 VIAL (ML) INJECTION
Status: DISCONTINUED | OUTPATIENT
Start: 2025-07-19 | End: 2025-07-21 | Stop reason: HOSPADM

## 2025-07-19 RX ORDER — CLOPIDOGREL BISULFATE 75 MG/1
75 TABLET ORAL DAILY
Status: DISCONTINUED | OUTPATIENT
Start: 2025-07-20 | End: 2025-07-21 | Stop reason: HOSPADM

## 2025-07-19 RX ORDER — AMLODIPINE BESYLATE 2.5 MG/1
2.5 TABLET ORAL DAILY
Status: DISCONTINUED | OUTPATIENT
Start: 2025-07-19 | End: 2025-07-20

## 2025-07-19 RX ORDER — IBUPROFEN 200 MG
24 TABLET ORAL
Status: DISCONTINUED | OUTPATIENT
Start: 2025-07-19 | End: 2025-07-21 | Stop reason: HOSPADM

## 2025-07-19 RX ORDER — TALC
9 POWDER (GRAM) TOPICAL NIGHTLY PRN
Status: DISCONTINUED | OUTPATIENT
Start: 2025-07-19 | End: 2025-07-21 | Stop reason: HOSPADM

## 2025-07-19 RX ORDER — INSULIN ASPART 100 [IU]/ML
0-5 INJECTION, SOLUTION INTRAVENOUS; SUBCUTANEOUS
Status: DISCONTINUED | OUTPATIENT
Start: 2025-07-19 | End: 2025-07-21 | Stop reason: HOSPADM

## 2025-07-19 RX ORDER — ENOXAPARIN SODIUM 100 MG/ML
40 INJECTION SUBCUTANEOUS EVERY 24 HOURS
Status: DISCONTINUED | OUTPATIENT
Start: 2025-07-19 | End: 2025-07-21 | Stop reason: HOSPADM

## 2025-07-19 RX ADMIN — ASPIRIN 325 MG ORAL TABLET 325 MG: 325 PILL ORAL at 06:07

## 2025-07-19 NOTE — LETTER
July 21, 2025         [address]  [facility] [address]  Phone: [phone]  Fax: [phone]       Patient: Della Juarez   YOB: 1955  Date of Visit: 7/19/2025    To Whom It May Concern:    Anna Juarez  was at [facility] on 7/19/2025-7/21/2025. The patient may return to work on 8/04/2025 pending restrictions from PT/OT/Neurology/and PCP. If you have any questions or concerns, or if I can be of further assistance, please do not hesitate to contact me.    Sincerely,    Daylin Hairston LPN

## 2025-07-19 NOTE — LETTER
July 21, 2025         [address] [facility] [address]  Phone: [phone]  Fax: [phone]       Patient: Della Juarez   YOB: 1955  Date of Visit: 07/21/2025    To Whom It May Concern:    Anna Juarez  was at [facility] on 07/21/2025. The patient may return to work on 8/04/2025 via restrictions from PCP or Neurology. If you have any questions or concerns, or if I can be of further assistance, please do not hesitate to contact me.    Sincerely,    Daylin Hairston LPN

## 2025-07-19 NOTE — ED PROVIDER NOTES
Encounter Date: 2025       History     Chief Complaint   Patient presents with    Hypertension     ONSET WED    Tingling     L HAND AND L LEG ONSET WED     69-year-old female history of current smoker, hypertension, diabetes presents to the ER with several days of left lower extremity numbness and weakness, left hand tingling and hypertension.  Does not check her blood pressure regularly.  Checked it last Wednesday because she was not thinking clearly and it was elevated.  Blood pressure has been between 170 systolic and 210.  Patient notes that she is under lot of stress.  No facial numbness or facial droop or slurred speech.  No chest pain or shortness of breath.  States her left leg feels weak and she describes some numbness and tingling below-the-knee.  Compliant with Norvasc and losartan.    The history is provided by the patient.     Review of patient's allergies indicates:   Allergen Reactions    Penicillins      Other reaction(s): Vomiting  Other reaction(s): Swelling  Other reaction(s): Nausea  Other reaction(s): Hives     Past Medical History:   Diagnosis Date    Diabetes mellitus     Hypertension     Mental disorder     Anxiety     Past Surgical History:   Procedure Laterality Date     SECTION      x3    COSMETIC SURGERY      Scar on right side of face     Family History   Problem Relation Name Age of Onset    Hemochromatosis Mother      Hypertension Mother      Stroke Mother      Diabetes Father      Hypertension Father      Breast cancer Paternal Aunt      Diabetes Maternal Grandmother      Hypertension Maternal Grandmother      Stroke Maternal Grandmother      Cancer Neg Hx      Colon cancer Neg Hx      Ovarian cancer Neg Hx      Eclampsia Neg Hx      Miscarriages / Stillbirths Neg Hx       labor Neg Hx       Social History[1]  Review of Systems   Constitutional:  Negative for fever.   HENT:  Negative for sore throat.    Respiratory:  Negative for shortness of breath.     Cardiovascular:  Negative for chest pain.   Gastrointestinal:  Negative for nausea.   Genitourinary:  Negative for dysuria.   Musculoskeletal:  Negative for back pain.   Skin:  Negative for rash.   Neurological:  Positive for weakness and numbness.   Hematological:  Does not bruise/bleed easily.       Physical Exam     Initial Vitals   BP Pulse Resp Temp SpO2   07/19/25 1715 07/19/25 1715 07/19/25 1715 07/19/25 1716 07/19/25 1715   (!) 190/119 94 19 98.8 °F (37.1 °C) 97 %      MAP       --                Physical Exam    Nursing note and vitals reviewed.  Constitutional: She appears well-developed and well-nourished. She is not diaphoretic. No distress.   HENT:   Head: Normocephalic and atraumatic.   Eyes: EOM are normal.   Neck: Neck supple.   Normal range of motion.  Cardiovascular:  Normal rate, regular rhythm and normal heart sounds.     Exam reveals no gallop and no friction rub.       No murmur heard.  Pulmonary/Chest: Breath sounds normal. No respiratory distress. She has no wheezes. She has no rhonchi. She has no rales.   Abdominal: She exhibits no distension. There is no abdominal tenderness. There is no rebound.   Musculoskeletal:         General: Normal range of motion.      Cervical back: Normal range of motion and neck supple.     Neurological: She is alert and oriented to person, place, and time.   Decreased sensation to light touch and pinprick in the left leg below-the-knee.  Right leg is unaffected.  She has some weakness with the hip flexion compared to the right.  Plantar flexion dorsiflexion is normal.   Skin: Skin is warm and dry.   Psychiatric: She has a normal mood and affect. Her behavior is normal. Judgment and thought content normal.         ED Course   Critical Care    Date/Time: 7/19/2025 8:13 PM    Performed by: Vignesh Engel MD  Authorized by: Vignesh Engel MD  Direct patient critical care time: 20 minutes  Additional history critical care time: 10 minutes  Ordering /  reviewing critical care time: 7 minutes  Documentation critical care time: 8 minutes  Consulting other physicians critical care time: 5 minutes  Consult with family critical care time: 5 minutes  Total critical care time (exclusive of procedural time) : 55 minutes  Critical care was necessary to treat or prevent imminent or life-threatening deterioration of the following conditions: cva.  Critical care was time spent personally by me on the following activities: development of treatment plan with patient or surrogate, discussions with consultants, evaluation of patient's response to treatment, examination of patient, obtaining history from patient or surrogate, ordering and performing treatments and interventions, ordering and review of laboratory studies, ordering and review of radiographic studies, pulse oximetry and re-evaluation of patient's condition.        Labs Reviewed   COMPREHENSIVE METABOLIC PANEL - Abnormal       Result Value    Sodium 141      Potassium 4.2      Chloride 108      CO2 23      Glucose 123 (*)     BUN 21      Creatinine 0.9      Calcium 9.6      Protein Total 6.8      Albumin 4.2      Bilirubin Total 0.5      ALP 67      AST 27      ALT 36      Anion Gap 10      eGFR >60     LIPID PANEL - Abnormal    Cholesterol Total 226 (*)     Triglyceride 97      HDL Cholesterol 61      LDL Cholesterol 145.6      HDL/Cholesterol Ratio 27.0      Cholesterol/HDL Ratio 3.7      Non HDL Cholesterol 165     CBC WITH DIFFERENTIAL - Abnormal    WBC 12.88 (*)     RBC 4.91      Hgb 15.3      Hct 45.0      MCV 92      MCH 31.2 (*)     MCHC 34.0      RDW 13.4      Platelet Count 259      MPV 10.5      Nucleated RBC 0      Neut % 75.7 (*)     Lymph % 15.1 (*)     Mono % 7.9      Eos % 0.5      Basophil % 0.4      Imm Grans % 0.4      Neut # 9.8 (*)     Lymph # 1.95      Mono # 1.02 (*)     Eos # 0.06      Baso # 0.05      Imm Grans # 0.05 (*)    PROTIME-INR - Normal    PT 11.7      INR 1.1     TSH - Normal    TSH  3.228     CBC W/ AUTO DIFFERENTIAL    Narrative:     The following orders were created for panel order CBC W/ AUTO DIFFERENTIAL.  Procedure                               Abnormality         Status                     ---------                               -----------         ------                     CBC with Differential[[id number]]       Abnormal            Final result                 Please view results for these tests on the individual orders.          Imaging Results              X-Ray Chest AP Portable (Final result)  Result time 07/19/25 18:45:45      Final result by Wesley Aguirre MD (07/19/25 18:45:45)                   Impression:      No acute findings      Electronically signed by: Wesley Aguirre  Date:    07/19/2025  Time:    18:45               Narrative:    EXAMINATION:  XR CHEST AP PORTABLE    CLINICAL HISTORY:  Essential (primary) hypertension    TECHNIQUE:  Single frontal view of the chest was performed.    COMPARISON:  11/29/2023    FINDINGS:  Lungs are clear. No focal consolidation. No pleural effusion. No pneumothorax. Normal heart size.                                       CT Head Without Contrast (Final result)  Result time 07/19/25 18:42:04      Final result by Wesley Aguirre MD (07/19/25 18:42:04)                   Impression:      Hypodensity in the right basal ganglia concerning for acute or subacute infarct.  Notified ordering physician at time of dictation.  Receipt confirmation 18:34      Electronically signed by: Wesley Aguirre  Date:    07/19/2025  Time:    18:42               Narrative:    EXAMINATION:  CT HEAD WITHOUT CONTRAST    CLINICAL HISTORY:  Neuro deficit, acute, stroke suspected;    TECHNIQUE:  Low dose axial images were obtained through the head.  Coronal and sagittal reformations were also performed. Contrast was not administered.    COMPARISON:  None.    FINDINGS:  Intracranial compartment:    Ventricles and sulci are normal in size for age  without evidence of hydrocephalus. No extra-axial blood or fluid collections.    No acute intracranial hemorrhage.  Hypodensity in the right basal ganglia concerning for acute or subacute infarct.  Background of mild chronic microvascular ischemia.    Skull/extracranial contents (limited evaluation): No fracture. Mastoid air cells and paranasal sinuses are essentially clear.                                       Medications   aspirin tablet 325 mg (325 mg Oral Given 7/19/25 1846)     Medical Decision Making  69-year-old female history of hypertension, active smoker presents to the ER with left lower extremity numbness and weakness.  CT demonstrates the expected abnormal finding, infarct in the basal ganglia.  She will be admitted to Hospital Medicine.    Amount and/or Complexity of Data Reviewed  Labs: ordered.  Radiology: ordered. Decision-making details documented in ED Course.  ECG/medicine tests: ordered and independent interpretation performed. Decision-making details documented in ED Course.    Risk  OTC drugs.  Decision regarding hospitalization.               ED Course as of 07/19/25 2014   Sat Jul 19, 2025   1728 BP(!): 190/119 [EF]   1728 Temp: 98.8 °F (37.1 °C) [EF]   1728 Temp Source: Oral [EF]   1728 Pulse: 94 [EF]   1728 Resp: 19 [EF]   1728 SpO2: 97 % [EF]   1731 Sinus rhythm 89 beats per minute normal axis no ST elevation or depression or T-wave inversion independently interpreted [EF]   1906 X-Ray Chest AP Portable [EF]   1906 CT Head Without Contrast [EF]   1906 CT scan is concerning for an infarct in the basal ganglia. [EF]   1933 Dr mera will admit [EF]      ED Course User Index  [EF] Vignesh Engel MD                               Clinical Impression:  Final diagnoses:  [R29.818] Acute focal neurological deficit  [I10] Hypertension          ED Disposition Condition    Admit                       [1]   Social History  Tobacco Use    Smoking status: Some Days     Current packs/day: 0.00      Average packs/day: 0.5 packs/day for 11.0 years (5.5 ttl pk-yrs)     Types: Cigarettes     Start date:      Last attempt to quit: 2016     Years since quittin.5     Passive exposure: Past    Smokeless tobacco: Never   Vaping Use    Vaping status: Every Day   Substance Use Topics    Alcohol use: No    Drug use: No        Vignesh Engel MD  25

## 2025-07-19 NOTE — PHARMACY MED REC
"Admission Medication History     The home medication history was taken by Gurjit Roman.    You may go to "Admission" then "Reconcile Home Medications" tabs to review and/or act upon these items.     The home medication list has been updated by the Pharmacy department.   Please read ALL comments highlighted in yellow.   Please address this information as you see fit.    Feel free to contact us if you have any questions or require assistance.      The medications listed below were removed from the home medication list. Please reorder if appropriate:  Patient reports no longer taking the following medication(s):  Vitamin c  b12  Zinc 50    Medications listed below were obtained from: Patient/family and Analytic software- [employer]  Medications Ordered Prior to Encounter[1]    Potential issues to be addressed PRIOR TO DISCHARGE  none    Gurjit Roman  [id number]                  .               [1]   No current facility-administered medications on file prior to encounter.     Current Outpatient Medications on File Prior to Encounter   Medication Sig Dispense Refill    ALPRAZolam (XANAX) 0.5 MG tablet Take 1 tablet (0.5 mg total) by mouth 2 (two) times daily. 60 tablet 5    amLODIPine (NORVASC) 2.5 MG tablet Take 1 tablet (2.5 mg total) by mouth once daily. 90 tablet 3    aspirin (ECOTRIN) 81 MG EC tablet Take 1 tablet (81 mg total) by mouth once daily.  0    losartan (COZAAR) 100 MG tablet Take 1 tablet (100 mg total) by mouth once daily. 90 tablet 3    metFORMIN (GLUCOPHAGE-XR) 500 MG ER 24hr tablet Take 1 tablet (500 mg total) by mouth 2 (two) times daily with meals. 180 tablet 3    multivitamin capsule Take 1 capsule by mouth once daily.      vitamin D (VITAMIN D3) 1000 units Tab Take 5,000 Units by mouth once daily.      blood sugar diagnostic Strp To check BG two times daily, to use with insurance preferred meter 200 each 1    blood-glucose meter kit To check BG two times daily, to use with insurance preferred meter 1 " each 0    clotrimazole-betamethasone 1-0.05% (LOTRISONE) cream Apply topically 2 (two) times daily. 45 g 1    glimepiride (AMARYL) 2 MG tablet Take 1 tablet (2 mg total) by mouth 2 (two) times a day. 180 tablet 3    lancets Misc To check BG two times daily, to use with insurance preferred meter 200 each 1    [DISCONTINUED] ascorbic acid, vitamin C, (VITAMIN C) 500 MG tablet Take 500 mg by mouth once daily.      [DISCONTINUED] cyanocobalamin 2000 MCG tablet Take 2,000 mcg by mouth once daily.      [DISCONTINUED] zinc gluconate 50 mg tablet Take 50 mg by mouth once daily.

## 2025-07-20 ENCOUNTER — CLINICAL SUPPORT (OUTPATIENT)
Dept: CARDIOLOGY | Facility: HOSPITAL | Age: 70
DRG: 066 | End: 2025-07-20
Attending: STUDENT IN AN ORGANIZED HEALTH CARE EDUCATION/TRAINING PROGRAM
Payer: MEDICARE

## 2025-07-20 LAB
ABSOLUTE EOSINOPHIL (SMH): 0.21 K/UL
ABSOLUTE MONOCYTE (SMH): 1.05 K/UL (ref 0.3–1)
ABSOLUTE NEUTROPHIL COUNT (SMH): 6.3 K/UL (ref 1.8–7.7)
ANION GAP (SMH): 8 MMOL/L (ref 8–16)
AORTIC ROOT ANNULUS: 3.4 CM
AORTIC VALVE CUSP SEPERATION: 1.7 CM
APICAL FOUR CHAMBER EJECTION FRACTION: 46 %
APICAL TWO CHAMBER EJECTION FRACTION: 55 %
AV INDEX (PROSTH): 0.61
AV MEAN GRADIENT: 5 MMHG
AV PEAK GRADIENT: 10 MMHG
AV VALVE AREA BY VELOCITY RATIO: 1.8 CM²
AV VALVE AREA: 1.9 CM²
AV VELOCITY RATIO: 0.56
BASOPHILS # BLD AUTO: 0.06 K/UL
BASOPHILS NFR BLD AUTO: 0.6 %
BSA FOR ECHO PROCEDURE: 2.16 M2
BUN SERPL-MCNC: 19 MG/DL (ref 8–23)
CALCIUM SERPL-MCNC: 9.1 MG/DL (ref 8.7–10.5)
CHLORIDE SERPL-SCNC: 108 MMOL/L (ref 95–110)
CO2 SERPL-SCNC: 23 MMOL/L (ref 23–29)
CREAT SERPL-MCNC: 0.8 MG/DL (ref 0.5–1.4)
CV ECHO LV RWT: 0.3 CM
DOP CALC AO PEAK VEL: 1.6 M/S
DOP CALC AO VTI: 30.6 CM
DOP CALC LVOT AREA: 3.1 CM2
DOP CALC LVOT DIAMETER: 2 CM
DOP CALC LVOT PEAK VEL: 0.9 M/S
DOP CALC LVOT STROKE VOLUME: 58.7 CM3
DOP CALC MV VTI: 29.4 CM
DOP CALCLVOT PEAK VEL VTI: 18.7 CM
E WAVE DECELERATION TIME: 320 MSEC
E/A RATIO: 0.78
E/E' RATIO: 18 M/S
ECHO LV POSTERIOR WALL: 0.7 CM (ref 0.6–1.1)
ERYTHROCYTE [DISTWIDTH] IN BLOOD BY AUTOMATED COUNT: 13.5 % (ref 11.5–14.5)
FRACTIONAL SHORTENING: 29.8 % (ref 28–44)
GFR SERPLBLD CREATININE-BSD FMLA CKD-EPI: >60 ML/MIN/1.73/M2
GLUCOSE SERPL-MCNC: 148 MG/DL (ref 70–110)
HCT VFR BLD AUTO: 44.7 % (ref 37–48.5)
HGB BLD-MCNC: 14.8 GM/DL (ref 12–16)
IMM GRANULOCYTES # BLD AUTO: 0.05 K/UL (ref 0–0.04)
IMM GRANULOCYTES NFR BLD AUTO: 0.5 % (ref 0–0.5)
INTERVENTRICULAR SEPTUM: 1.3 CM (ref 0.6–1.1)
IVC DIAMETER: 1.22 CM
LEFT ATRIUM AREA SYSTOLIC (APICAL 2 CHAMBER): 30.8 CM2
LEFT ATRIUM AREA SYSTOLIC (APICAL 4 CHAMBER): 20.7 CM2
LEFT ATRIUM VOLUME INDEX MOD: 43 ML/M2
LEFT ATRIUM VOLUME MOD: 89 ML
LEFT INTERNAL DIMENSION IN SYSTOLE: 3.3 CM (ref 2.1–4)
LEFT VENTRICLE DIASTOLIC VOLUME INDEX: 48.31 ML/M2
LEFT VENTRICLE DIASTOLIC VOLUME: 100 ML
LEFT VENTRICLE END DIASTOLIC VOLUME APICAL 2 CHAMBER: 53.4 ML
LEFT VENTRICLE END DIASTOLIC VOLUME APICAL 4 CHAMBER: 99.3 ML
LEFT VENTRICLE END SYSTOLIC VOLUME APICAL 2 CHAMBER: 113 ML
LEFT VENTRICLE END SYSTOLIC VOLUME APICAL 4 CHAMBER: 60.2 ML
LEFT VENTRICLE MASS INDEX: 79.4 G/M2
LEFT VENTRICLE SYSTOLIC VOLUME INDEX: 20.8 ML/M2
LEFT VENTRICLE SYSTOLIC VOLUME: 43 ML
LEFT VENTRICULAR INTERNAL DIMENSION IN DIASTOLE: 4.7 CM (ref 3.5–6)
LEFT VENTRICULAR MASS: 164.5 G
LV LATERAL E/E' RATIO: 15.3 M/S
LV SEPTAL E/E' RATIO: 23 M/S
LVED V (TEICH): 99.83 ML
LVES V (TEICH): 42.53 ML
LVOT MG: 2 MMHG
LVOT MV: 0.63 CM/S
LYMPHOCYTES # BLD AUTO: 2.04 K/UL (ref 1–4.8)
MAGNESIUM SERPL-MCNC: 1.7 MG/DL (ref 1.6–2.6)
MCH RBC QN AUTO: 30.8 PG (ref 27–31)
MCHC RBC AUTO-ENTMCNC: 33.1 G/DL (ref 32–36)
MCV RBC AUTO: 93 FL (ref 82–98)
MV MEAN GRADIENT: 3 MMHG
MV PEAK A VEL: 1.18 M/S
MV PEAK E VEL: 0.92 M/S
MV PEAK GRADIENT: 7 MMHG
MV VALVE AREA BY CONTINUITY EQUATION: 2 CM2
NUCLEATED RBC (/100WBC) (SMH): 0 /100 WBC
OHS CV CPX PATIENT HEIGHT IN: 64.5
OHS LV EJECTION FRACTION SIMPSONS BIPLANE MOD: 48 %
PHOSPHATE SERPL-MCNC: 4 MG/DL (ref 2.7–4.5)
PLATELET # BLD AUTO: 241 K/UL (ref 150–450)
PMV BLD AUTO: 10.9 FL (ref 9.2–12.9)
POCT GLUCOSE: 124 MG/DL (ref 70–110)
POCT GLUCOSE: 143 MG/DL (ref 70–110)
POCT GLUCOSE: 143 MG/DL (ref 70–110)
POCT GLUCOSE: 152 MG/DL (ref 70–110)
POCT GLUCOSE: 156 MG/DL (ref 70–110)
POTASSIUM SERPL-SCNC: 3.8 MMOL/L (ref 3.5–5.1)
PV MV: 0.63 M/S
PV PEAK GRADIENT: 4 MMHG
PV PEAK VELOCITY: 0.95 M/S
RBC # BLD AUTO: 4.8 M/UL (ref 4–5.4)
RELATIVE EOSINOPHIL (SMH): 2.2 % (ref 0–8)
RELATIVE LYMPHOCYTE (SMH): 21.1 % (ref 18–48)
RELATIVE MONOCYTE (SMH): 10.9 % (ref 4–15)
RELATIVE NEUTROPHIL (SMH): 64.7 % (ref 38–73)
RIGHT ATRIUM END SYSTOLIC VOLUME APICAL 4 CHAMBER INDEX BSA: 18.31 ML/M2
RIGHT ATRIUM VOLUME AREA LENGTH APICAL 4 CHAMBER: 37.9 ML
RV TISSUE DOPPLER FREE WALL SYSTOLIC VELOCITY 1 (APICAL 4 CHAMBER VIEW): 15 CM/S
SODIUM SERPL-SCNC: 139 MMOL/L (ref 136–145)
TDI LATERAL: 0.06 M/S
TDI SEPTAL: 0.04 M/S
TDI: 0.05 M/S
TRICUSPID ANNULAR PLANE SYSTOLIC EXCURSION: 1.7 CM
WBC # BLD AUTO: 9.67 K/UL (ref 3.9–12.7)
Z-SCORE OF LEFT VENTRICULAR DIMENSION IN END DIASTOLE: -2.92
Z-SCORE OF LEFT VENTRICULAR DIMENSION IN END SYSTOLE: -1.22

## 2025-07-20 PROCEDURE — 97161 PT EVAL LOW COMPLEX 20 MIN: CPT

## 2025-07-20 PROCEDURE — 21400001 HC TELEMETRY ROOM

## 2025-07-20 PROCEDURE — 36415 COLL VENOUS BLD VENIPUNCTURE: CPT | Performed by: STUDENT IN AN ORGANIZED HEALTH CARE EDUCATION/TRAINING PROGRAM

## 2025-07-20 PROCEDURE — 25000003 PHARM REV CODE 250: Performed by: STUDENT IN AN ORGANIZED HEALTH CARE EDUCATION/TRAINING PROGRAM

## 2025-07-20 PROCEDURE — 85025 COMPLETE CBC W/AUTO DIFF WBC: CPT | Performed by: STUDENT IN AN ORGANIZED HEALTH CARE EDUCATION/TRAINING PROGRAM

## 2025-07-20 PROCEDURE — 83735 ASSAY OF MAGNESIUM: CPT | Performed by: STUDENT IN AN ORGANIZED HEALTH CARE EDUCATION/TRAINING PROGRAM

## 2025-07-20 PROCEDURE — 84100 ASSAY OF PHOSPHORUS: CPT | Performed by: STUDENT IN AN ORGANIZED HEALTH CARE EDUCATION/TRAINING PROGRAM

## 2025-07-20 PROCEDURE — 82962 GLUCOSE BLOOD TEST: CPT

## 2025-07-20 PROCEDURE — 94761 N-INVAS EAR/PLS OXIMETRY MLT: CPT

## 2025-07-20 PROCEDURE — 99900035 HC TECH TIME PER 15 MIN (STAT)

## 2025-07-20 PROCEDURE — 93306 TTE W/DOPPLER COMPLETE: CPT

## 2025-07-20 PROCEDURE — 99900031 HC PATIENT EDUCATION (STAT)

## 2025-07-20 PROCEDURE — G0427 INPT/ED TELECONSULT70: HCPCS | Mod: 95,,, | Performed by: PSYCHIATRY & NEUROLOGY

## 2025-07-20 PROCEDURE — 97116 GAIT TRAINING THERAPY: CPT

## 2025-07-20 PROCEDURE — 93306 TTE W/DOPPLER COMPLETE: CPT | Mod: 26,,, | Performed by: INTERNAL MEDICINE

## 2025-07-20 PROCEDURE — 25000003 PHARM REV CODE 250

## 2025-07-20 PROCEDURE — 80048 BASIC METABOLIC PNL TOTAL CA: CPT | Performed by: STUDENT IN AN ORGANIZED HEALTH CARE EDUCATION/TRAINING PROGRAM

## 2025-07-20 PROCEDURE — 94799 UNLISTED PULMONARY SVC/PX: CPT

## 2025-07-20 RX ORDER — AMLODIPINE BESYLATE 2.5 MG/1
2.5 TABLET ORAL DAILY
Status: DISCONTINUED | OUTPATIENT
Start: 2025-07-20 | End: 2025-07-21

## 2025-07-20 RX ADMIN — CLOPIDOGREL 75 MG: 75 TABLET ORAL at 08:07

## 2025-07-20 RX ADMIN — METFORMIN HYDROCHLORIDE 500 MG: 500 TABLET, EXTENDED RELEASE ORAL at 05:07

## 2025-07-20 RX ADMIN — METFORMIN HYDROCHLORIDE 500 MG: 500 TABLET, EXTENDED RELEASE ORAL at 08:07

## 2025-07-20 RX ADMIN — Medication 800 MG: at 05:07

## 2025-07-20 RX ADMIN — POTASSIUM BICARBONATE 50 MEQ: 391 TABLET, EFFERVESCENT ORAL at 08:07

## 2025-07-20 RX ADMIN — ATORVASTATIN CALCIUM 40 MG: 40 TABLET, FILM COATED ORAL at 08:07

## 2025-07-20 RX ADMIN — METFORMIN HYDROCHLORIDE 500 MG: 500 TABLET, EXTENDED RELEASE ORAL at 12:07

## 2025-07-20 RX ADMIN — AMLODIPINE BESYLATE 2.5 MG: 2.5 TABLET ORAL at 04:07

## 2025-07-20 RX ADMIN — Medication 800 MG: at 08:07

## 2025-07-20 RX ADMIN — ASPIRIN 81 MG: 81 TABLET ORAL at 08:07

## 2025-07-20 RX ADMIN — LOSARTAN POTASSIUM 100 MG: 50 TABLET, FILM COATED ORAL at 08:07

## 2025-07-20 NOTE — H&P
[facility] - Emergency Dept  Hospital Medicine  History & Physical    Patient Name: Della Juarez  MRN: [id number]  Patient Class: IP- Inpatient  Admission Date: 2025  Attending Physician: Vignesh Engel MD   Primary Care Provider: Fatuma Hollins MD         Patient information was obtained from patient and ER records.     Subjective:     Principal Problem:CVA (cerebral vascular accident)    Chief Complaint:   Chief Complaint   Patient presents with    Hypertension     ONSET WED    Tingling     L HAND AND L LEG ONSET WED        HPI: 69 year old female with comorbid conditions of hypertension, diabetes, current smoker presents due to several days of left lower extremity numbness and weakness, left hand tingling and elevated blood pressure.  Symptoms started on Wednesday and have persisted.  Patient does not check BP regularly but on Wednesday she checked because she was not thinking clearly (was elevated to 170-210s).   Reports being under a lot of stress recently.  Denies headache, chills, fevers, recent travel, sick contact, chest pain, SOB, nausea/vomiting, diarrhea.  Denies facial numbness/drooping, slurred speech.  Reports compliance with antihypertensives.    Reports mother had 3 strokes and she  from the last one.    In ED blood pressure is in 180-200s systolic.  CT head revealed hypodensity in right basal gangla concerning for acute vs subacute stroke. Patient was given 325mg aspirin in ED.    Past Medical History:   Diagnosis Date    Diabetes mellitus     Hypertension     Mental disorder     Anxiety       Past Surgical History:   Procedure Laterality Date     SECTION      x3    COSMETIC SURGERY      Scar on right side of face       Review of patient's allergies indicates:   Allergen Reactions    Penicillins      Other reaction(s): Vomiting  Other reaction(s): Swelling  Other reaction(s): Nausea  Other reaction(s): Hives       No current facility-administered medications on  file prior to encounter.     Current Outpatient Medications on File Prior to Encounter   Medication Sig    ALPRAZolam (XANAX) 0.5 MG tablet Take 1 tablet (0.5 mg total) by mouth 2 (two) times daily.    amLODIPine (NORVASC) 2.5 MG tablet Take 1 tablet (2.5 mg total) by mouth once daily.    aspirin (ECOTRIN) 81 MG EC tablet Take 1 tablet (81 mg total) by mouth once daily.    losartan (COZAAR) 100 MG tablet Take 1 tablet (100 mg total) by mouth once daily.    metFORMIN (GLUCOPHAGE-XR) 500 MG ER 24hr tablet Take 1 tablet (500 mg total) by mouth 2 (two) times daily with meals.    multivitamin capsule Take 1 capsule by mouth once daily.    vitamin D (VITAMIN D3) 1000 units Tab Take 5,000 Units by mouth once daily.    blood sugar diagnostic Strp To check BG two times daily, to use with insurance preferred meter    blood-glucose meter kit To check BG two times daily, to use with insurance preferred meter    clotrimazole-betamethasone 1-0.05% (LOTRISONE) cream Apply topically 2 (two) times daily.    glimepiride (AMARYL) 2 MG tablet Take 1 tablet (2 mg total) by mouth 2 (two) times a day.    lancets Misc To check BG two times daily, to use with insurance preferred meter    [DISCONTINUED] ascorbic acid, vitamin C, (VITAMIN C) 500 MG tablet Take 500 mg by mouth once daily.    [DISCONTINUED] cyanocobalamin 2000 MCG tablet Take 2,000 mcg by mouth once daily.    [DISCONTINUED] zinc gluconate 50 mg tablet Take 50 mg by mouth once daily.     Family History       Problem Relation (Age of Onset)    Breast cancer Paternal Aunt    Diabetes Father, Maternal Grandmother    Hemochromatosis Mother    Hypertension Mother, Father, Maternal Grandmother    Stroke Mother, Maternal Grandmother          Tobacco Use    Smoking status: Some Days     Current packs/day: 0.00     Average packs/day: 0.5 packs/day for 11.0 years (5.5 ttl pk-yrs)     Types: Cigarettes     Start date:      Last attempt to quit:      Years since quittin.5      Passive exposure: Past    Smokeless tobacco: Never   Vaping Use    Vaping status: Every Day   Substance and Sexual Activity    Alcohol use: No    Drug use: No    Sexual activity: Not Currently     Review of Systems   Constitutional:  Negative for chills and fever.   HENT:  Negative for hearing loss, sinus pain and sore throat.    Eyes:  Negative for visual disturbance.   Respiratory:  Negative for shortness of breath and wheezing.    Cardiovascular:  Negative for chest pain, palpitations and leg swelling.   Gastrointestinal:  Negative for abdominal pain, diarrhea, nausea and vomiting.   Endocrine: Negative for polyuria.   Genitourinary:  Negative for dysuria and hematuria.   Musculoskeletal:  Negative for back pain and myalgias.   Skin:  Negative for pallor and rash.   Neurological:  Positive for weakness and numbness. Negative for dizziness, seizures, syncope, facial asymmetry, light-headedness and headaches.   Psychiatric/Behavioral:  Negative for confusion.      Objective:     Vital Signs (Most Recent):  Temp: 98.8 °F (37.1 °C) (07/19/25 1716)  Pulse: 82 (07/19/25 2000)  Resp: (!) 24 (07/19/25 2000)  BP: (!) 203/101 (07/19/25 2000)  SpO2: 97 % (07/19/25 2000) Vital Signs (24h Range):  Temp:  [98.8 °F (37.1 °C)] 98.8 °F (37.1 °C)  Pulse:  [78-94] 82  Resp:  [19-28] 24  SpO2:  [96 %-98 %] 97 %  BP: (179-215)/() 203/101     Weight: 90.7 kg (200 lb)  Body mass index is 33.8 kg/m².     Physical Exam  Constitutional:       General: She is not in acute distress.     Appearance: Normal appearance.   HENT:      Head: Normocephalic and atraumatic.      Nose: No congestion or rhinorrhea.      Mouth/Throat:      Pharynx: No oropharyngeal exudate or posterior oropharyngeal erythema.   Eyes:      General: No scleral icterus.     Extraocular Movements: Extraocular movements intact.      Pupils: Pupils are equal, round, and reactive to light.   Cardiovascular:      Rate and Rhythm: Normal rate and regular rhythm.       "Pulses: Normal pulses.      Heart sounds: Normal heart sounds. No murmur heard.  Pulmonary:      Effort: Pulmonary effort is normal. No respiratory distress.      Breath sounds: Normal breath sounds. No wheezing or rales.   Abdominal:      General: There is no distension.      Palpations: Abdomen is soft.      Tenderness: There is no abdominal tenderness.   Musculoskeletal:      Right lower leg: No edema.      Left lower leg: No edema.   Skin:     General: Skin is warm and dry.      Coloration: Skin is not jaundiced.      Findings: No bruising.   Neurological:      Mental Status: She is alert and oriented to person, place, and time.      Sensory: Sensory deficit present.      Motor: Weakness present.      Comments: Patient has mild weakness in left upper extremity and subjective numbness and tingling in fingertips.  Subjective numbness of left foot compared to right, reports subjective heaviness and numbness in left leg.    Cranial nerves intact  No drift in any extremity   Psychiatric:         Mood and Affect: Mood normal.              CRANIAL NERVES     CN III, IV, VI   Pupils are equal, round, and reactive to light.       Significant Labs: All pertinent labs within the past 24 hours have been reviewed.  A1C:   Recent Labs   Lab 05/21/25  0818   HGBA1C 7.4*     ABGs: No results for input(s): "PH", "PCO2", "HCO3", "POCSATURATED", "BE", "TOTALHB", "COHB", "METHB", "O2HB", "POCFIO2", "PO2" in the last 48 hours.  Bilirubin:   Recent Labs   Lab 07/19/25  1747   BILITOT 0.5     Blood Culture: No results for input(s): "LABBLOO" in the last 48 hours.  BMP:   Recent Labs   Lab 07/19/25  1747   *      K 4.2      CO2 23   BUN 21   CREATININE 0.9   CALCIUM 9.6     CBC:   Recent Labs   Lab 07/19/25  1747   WBC 12.88*   HGB 15.3   HCT 45.0        CMP:   Recent Labs   Lab 07/19/25  1747      K 4.2      CO2 23   *   BUN 21   CREATININE 0.9   CALCIUM 9.6   PROT 6.8   ALBUMIN 4.2 " "  BILITOT 0.5   ALKPHOS 67   AST 27   ALT 36   ANIONGAP 10     Cardiac Markers: No results for input(s): "CKMB", "MYOGLOBIN", "BNP", "TROPISTAT" in the last 48 hours.  Coagulation:   Recent Labs   Lab 07/19/25  1747   INR 1.1     Lactic Acid: No results for input(s): "LACTATE" in the last 48 hours.  Lipase: No results for input(s): "LIPASE" in the last 48 hours.  Lipid Panel:   Recent Labs   Lab 07/19/25  1747   CHOL 226*   HDL 61   LDLCALC 145.6   TRIG 97   CHOLHDL 27.0     Magnesium: No results for input(s): "MG" in the last 48 hours.  Troponin: No results for input(s): "TROPONINI", "TROPONINIHS" in the last 48 hours.  TSH:   Recent Labs   Lab 07/19/25 1747   TSH 3.228     Urine Studies: No results for input(s): "COLORU", "APPEARANCEUA", "PHUR", "SPECGRAV", "PROTEINUA", "GLUCUA", "KETONESU", "BILIRUBINUA", "OCCULTUA", "NITRITE", "UROBILINOGEN", "LEUKOCYTESUR", "RBCUA", "WBCUA", "BACTERIA", "SQUAMEPITHEL", "HYALINECASTS" in the last 48 hours.    Invalid input(s): "WRIGHTSUR"    Significant Imaging: I have reviewed all pertinent imaging results/findings within the past 24 hours.  Assessment/Plan:     Assessment & Plan  CVA (cerebral vascular accident)  Patient found to have subacute vs acute stroke of right basal ganglia.     Antithrombotics for secondary stroke prevention: Antiplatelets: Aspirin: 81 mg daily  Clopidogrel: 75 mg daily    Statins for secondary stroke prevention and hyperlipidemia, if present:   Statins: Atorvastatin- 40 mg daily    Aggressive risk factor modification: HTN, Smoking, DM     Rehab efforts: The patient has been evaluated by a stroke team provider and the therapy needs have been fully considered based off the presenting complaints and exam findings. The following therapy evaluations are needed: PT evaluate and treat    Diagnostics ordered/pending: MRA head to assess vasculature, MRA neck/arch to assess vasculature, MRI head without contrast to assess brain parenchyma, TTE to assess " cardiac function/status     VTE prophylaxis: Enoxaparin 40 mg SQ every 24 hours    BP parameters: Infarct: No intervention, SBP <220    - Follow vascular neurology consult recommendations  - Permissive hypertension - PRN IV labetalol for SBP > 220  - Obtain MRI head, MRA head/neck  - Obtain ECHO  - Continue DAPT  - High intensity statin   - PT evaluation  - Smoking cessation  Hypertension  Patient's blood pressure range in the last 24 hours was: BP  Min: 179/90  Max: 215/100.The patient's inpatient anti-hypertensive regimen is listed below:  Current Antihypertensives  amLODIPine tablet 2.5 mg, Daily, Oral  losartan tablet 100 mg, Daily, Oral  labetaloL injection 10 mg, Every 6 hours PRN, Intravenous    Plan  - BP is uncontrolled, will adjust as follows: allow permissive hypertension up to systolic of 220  - PRN IV labetalol for SBP > 220    Tobacco use  Nicotine patch PRN  Advised patient on importance of smoking cessation    Type 2 diabetes mellitus, without long-term current use of insulin  Hold oral antihyperglycemics  Insulin low dose sliding scale  POCT  Carb consistent diet    VTE Risk Mitigation (From admission, onward)           Ordered     enoxaparin injection 40 mg  Daily         07/19/25 2110     IP VTE HIGH RISK PATIENT  Once         07/19/25 2110     Place sequential compression device  Until discontinued         07/19/25 2110                                                Chilo Alexander MD  Department of Hospital Medicine  Formerly [facility] - Emergency Dept

## 2025-07-20 NOTE — RESPIRATORY THERAPY
07/20/25 0740   Patient Assessment/Suction   Level of Consciousness (AVPU) alert   Respiratory Effort Normal;Unlabored   Expansion/Accessory Muscles/Retractions no use of accessory muscles;no retractions;expansion symmetric   Rhythm/Pattern, Respiratory unlabored;pattern regular;depth regular;no shortness of breath reported   Cough Frequency no cough   PRE-TX-O2   Device (Oxygen Therapy) room air   SpO2 97 %   Pulse Oximetry Type Intermittent   $ Pulse Oximetry - Multiple Charge Pulse Oximetry - Multiple   Pulse 70   Resp 16   Education   $ Education 15 min;Oxygen

## 2025-07-20 NOTE — ASSESSMENT & PLAN NOTE
Patient's blood pressure range in the last 24 hours was: BP  Min: 166/119  Max: 215/100.The patient's inpatient anti-hypertensive regimen is listed below:  Current Antihypertensives  amLODIPine tablet 2.5 mg, Daily, Oral  losartan tablet 100 mg, Daily, Oral  labetaloL injection 10 mg, Every 6 hours PRN, Intravenous    Plan  - BP is uncontrolled, will adjust as follows: allow permissive hypertension up to systolic of 220  - PRN IV labetalol for SBP > 220

## 2025-07-20 NOTE — RESPIRATORY THERAPY
07/20/25 0745   Patient Assessment/Suction   Respiratory Effort Normal;Unlabored   Expansion/Accessory Muscles/Retractions no use of accessory muscles;no retractions;expansion symmetric   Rhythm/Pattern, Respiratory unlabored;pattern regular;depth regular;no shortness of breath reported   Cough Frequency no cough   PRE-TX-O2   Device (Oxygen Therapy) room air   SpO2 97 %   Pulse Oximetry Type Intermittent   $ Pulse Oximetry - Multiple Charge Pulse Oximetry - Multiple   Pulse 71   Resp 17   Tobacco Cessation Intervention   Do you use any type of tobacco product? Yes   Are you interested in quitting use of tobacco products? Not interested   Are you interested in Nicotine Replacement for symptom relief? No   Respiratory Evaluation   $ Care Plan Tech Time 15 min   $ Respiratory Re-evaluation Complete   Evaluation For New Orders   Admitting Diagnosis CVA   Cardiac Diagnosis NA   Pulmonary Diagnosis NA   Current Surgeries NONE   Home Oxygen   Has Home Oxygen? No   Home Aerosol, MDI, DPI, and Other Treatments/Therapies   Home Respiratory Therapy Per Patient/Review of Chart No   Oxygen Care Plan   Oxygen Care Plan Per Protocol   SPO2 Goal (%) 92% non-cardiac   Bronchodilator Care Plan   Rationale No Rationale found   Atelectasis Care Plan   Rationale No Rational Found   Airway Clearance Care Plan   Rationale No rationale found

## 2025-07-20 NOTE — ASSESSMENT & PLAN NOTE
Patient found to have subacute vs acute stroke of right basal ganglia.     Antithrombotics for secondary stroke prevention: Antiplatelets: Aspirin: 81 mg daily  Clopidogrel: 75 mg daily    Statins for secondary stroke prevention and hyperlipidemia, if present:   Statins: Atorvastatin- 40 mg daily    Aggressive risk factor modification: HTN, Smoking, DM     Rehab efforts: The patient has been evaluated by a stroke team provider and the therapy needs have been fully considered based off the presenting complaints and exam findings. The following therapy evaluations are needed: PT evaluate and treat    Diagnostics ordered/pending: MRA head to assess vasculature, MRA neck/arch to assess vasculature, MRI head without contrast to assess brain parenchyma, TTE to assess cardiac function/status     VTE prophylaxis: Enoxaparin 40 mg SQ every 24 hours    BP parameters: Infarct: No intervention, SBP <220    - Follow vascular neurology consult recommendations  - Permissive hypertension - PRN IV labetalol for SBP > 220  - Obtain MRI head, MRA head/neck  - Obtain ECHO  - Continue DAPT  - High intensity statin   - PT evaluation  - Smoking cessation

## 2025-07-20 NOTE — TELEMEDICINE CONSULT
"[facility]   General Neurology  Consult Note      Consult Information  Inpatient Consult to Neurology Services (General Neurology)  Consult performed by: Jefferson Coelho MD  Consult ordered by: Meena Delarosa NP          Consulting Provider:    Current Providers  No providers found    Patient Location:  [place] [address] [address]  Unit    Spoke hospital nurse at bedside with patient assisting consultant.  Patient information was obtained from patient and relative(s).       Neurology Documentation       Blood pressure (!) 197/96, pulse 74, temperature 98.1 °F (36.7 °C), temperature source Oral, resp. rate 16, height 5' 4.5" (1.638 m), weight 102.2 kg (225 lb 5 oz), SpO2 98%.    Medical Decision Making  HPI:69 year old female with comorbid conditions of hypertension, diabetes, current smoker presents due to several days of left lower extremity numbness and weakness, left hand tingling and elevated blood pressure.  Symptoms started on Wednesday and have persisted.  Patient does not check BP regularly but on Wednesday she checked because she was not thinking clearly (was elevated to 170-210s).   Reports being under a lot of stress recently.  Denies headache, chills, fevers, recent travel, sick contact, chest pain, SOB, nausea/vomiting, diarrhea.  Denies facial numbness/drooping, slurred speech.  Reports compliance with antihypertensives.    Reports mother had 3 strokes and she  from the last one.    In ED blood pressure is in 180-200s systolic.  CT head revealed hypodensity in right basal gangla concerning for acute vs subacute stroke. Patient was given 325mg aspirin in ED.       Patient was evaluated and closely monitored for stroke like symptoms. Patient's CT head was negative but follow up MRI showed acute ischemic stroke. Patient was started on ASA, Plavix, statin therapy, and Lovenox. On day one of admission patient said that her symptoms were not resolved but slightly improved.    Patient continues to " have L sided weakness. MRI explained- etiology of stroke is small vessel due to uncontrolled risk factors such cigarette smoking, hypertension, hyperlipidemia and diabetes.  Agree with dual antiplatelet therapy and statin for 21 days then monotherapy with Plavix that she take daily aspirin in the past.  Patient will need A1c ordered and followed up.         Images personally reviewed and interpreted:  Study: MRI Brain and MRA Brain & Neck  Study Interpretation: Acute ischemic infarction involving the anterior/rightward aspect of the brainstem at the pontomedullary junction.     MRA- neg for LVO    Additional studies reviewed:   Study: Cardiac_Neuro: 2D echo  Study Interpretation: neg for acute process    Laboratory studies reviewed:  BMP:   Lab Results   Component Value Date     07/20/2025    K 3.8 07/20/2025     07/20/2025    CO2 23 07/20/2025    BUN 19 07/20/2025    CREATININE 0.8 07/20/2025    CALCIUM 9.1 07/20/2025     CBC:   Lab Results   Component Value Date    WBC 9.67 07/20/2025    RBC 4.80 07/20/2025    HGB 14.8 07/20/2025    HCT 44.7 07/20/2025     07/20/2025    MCV 93 07/20/2025    MCH 30.8 07/20/2025    MCHC 33.1 07/20/2025     Lipid Panel:   Lab Results   Component Value Date    CHOL 226 (H) 07/19/2025    LDLCALC 145.6 07/19/2025    HDL 61 07/19/2025    TRIG 97 07/19/2025     Coagulation:   Lab Results   Component Value Date    INR 1.1 07/19/2025    APTT 23.3 04/20/2015     Hgb A1C:   Lab Results   Component Value Date    HGBA1C 7.4 (H) 05/21/2025    HGBA1C 6.8 (H) 10/07/2020     TSH:   Lab Results   Component Value Date    TSH 3.228 07/19/2025       Documentation personally reviewed:  Notes: Notes: ED notes and H&P     Assessment and plan:  Patient is a 69-year-old female with uncontrolled hypertension, diabetes and hyperlipidemia and current cigarette smoking who presents to tele Neurology Clinic to a brainstem stroke causing left-sided weakness.  Prolonged discussion regarding  vascular risk factor optimization.    Patient continues to have L sided weakness. MRI explained- etiology of stroke is small vessel due to uncontrolled risk factors such cigarette smoking, hypertension, hyperlipidemia and diabetes.  Agree with dual antiplatelet therapy and statin for 21 days then monotherapy with Plavix that she take daily aspirin in the past.  Patient will need A1c ordered and followed up.    Post charge discharge plan:  Clinic follow up: Vascular Neurology    Visit Type: in-person only  Timeframe: 4 weeks        Additional Physical Exam, History, & ROS  Review of Systems   Neurological:  Positive for tingling, sensory change, focal weakness and weakness. Negative for speech change, seizures, loss of consciousness and headaches.     Physical Exam  Neurological:      Mental Status: She is alert and oriented to person, place, and time. Mental status is at baseline.      Cranial Nerves: No dysarthria or facial asymmetry.      Motor: Weakness present. No seizure activity.      Comments: L sided drift in upper. Lower is heavier subjectively       Past Medical History:   Diagnosis Date    Diabetes mellitus     Hypertension     Mental disorder     Anxiety     Past Surgical History:   Procedure Laterality Date     SECTION      x3    COSMETIC SURGERY      Scar on right side of face     Family History   Problem Relation Name Age of Onset    Hemochromatosis Mother      Hypertension Mother      Stroke Mother      Diabetes Father      Hypertension Father      Breast cancer Paternal Aunt      Diabetes Maternal Grandmother      Hypertension Maternal Grandmother      Stroke Maternal Grandmother      Cancer Neg Hx      Colon cancer Neg Hx      Ovarian cancer Neg Hx      Eclampsia Neg Hx      Miscarriages / Stillbirths Neg Hx       labor Neg Hx         Diagnoses  No problems updated.    Jefferson Coelho MD    Neurology consultation requested by spoke provider. Audiovisual encounter with the  patient performed using a secure connection.  Results and impressions from the visit are documented on this note and were communicated to the consulting provider/team via direct communication. The note has been shared for addition to the patients electronic medical record.

## 2025-07-20 NOTE — SUBJECTIVE & OBJECTIVE
"Interval History: Patient seen and examined today. No acute events overnight. Patient had no complaints this morning but a lot of questions and concerns regarding her current treatment. Patient is uncomfortable with taking atorvastatin due to unnamed side effects she has been informed of. Patient also stated that she gets an upset stomach with Aspirin and can only take Ecotrin, and she also initially stated that she did not want to be given Lovenox. Patient is requesting she receive the "newer" statin drugs with less side effects, she was unable to provide the names but says her son will have them in his phone. I explained at length she had an acute ischemic stroke and why her treatment was prescribed. Patient verbalized understanding, but still requests we switch her statin soon because of the "dangers" associated with stopping therapy and restarting new medication. Explained to patient this was not a concern but once we had her preferred drug we would review and consider alternative to atorvastatin.     Review of Systems   Constitutional:  Negative for chills and fever.   Eyes:  Negative for visual disturbance.   Respiratory:  Negative for chest tightness and shortness of breath.    Cardiovascular:  Negative for leg swelling.   Gastrointestinal:  Negative for diarrhea, nausea and vomiting.   Neurological:  Negative for dizziness, speech difficulty, light-headedness and headaches.     Objective:     Vital Signs (Most Recent):  Temp: 97.7 °F (36.5 °C) (07/20/25 0719)  Pulse: 70 (07/20/25 0740)  Resp: 16 (07/20/25 0740)  BP: (!) 172/81 (07/20/25 0719)  SpO2: 97 % (07/20/25 0740) Vital Signs (24h Range):  Temp:  [97.7 °F (36.5 °C)-98.8 °F (37.1 °C)] 97.7 °F (36.5 °C)  Pulse:  [68-94] 70  Resp:  [16-28] 16  SpO2:  [95 %-98 %] 97 %  BP: (166-215)/() 172/81     Weight: 102.2 kg (225 lb 5 oz)  Body mass index is 38.08 kg/m².    Intake/Output Summary (Last 24 hours) at 7/20/2025 1102  Last data filed at 7/20/2025 " 0944  Gross per 24 hour   Intake 300 ml   Output --   Net 300 ml         Physical Exam  Cardiovascular:      Rate and Rhythm: Normal rate and regular rhythm.      Pulses: Normal pulses.      Heart sounds: Normal heart sounds.   Pulmonary:      Effort: Pulmonary effort is normal.      Breath sounds: Normal breath sounds.   Abdominal:      General: Bowel sounds are normal.      Palpations: Abdomen is soft.   Musculoskeletal:         General: Normal range of motion.   Skin:     General: Skin is warm and dry.   Neurological:      General: No focal deficit present.      Mental Status: She is alert and oriented to person, place, and time.   Psychiatric:         Attention and Perception: Attention normal.         Mood and Affect: Mood is anxious.         Speech: Speech normal.               Significant Labs: All pertinent labs within the past 24 hours have been reviewed.  Recent Lab Results  (Last 5 results in the past 24 hours)        07/20/25  0720   07/20/25  0628   07/20/25  0139   07/19/25  2211   07/19/25  1747        Albumin         4.2       ALP         67       ALT         36       Anion Gap   8       10       AST         27       Baso #   0.06       0.05       Basophil %   0.6       0.4       BILIRUBIN TOTAL         0.5  Comment: For infants and newborns, interpretation of results should be based   on gestational age, weight and in agreement with clinical   observations.    Premature Infant recommended reference ranges:   0-24 hours:  <8.0 mg/dL   24-48 hours: <12.0 mg/dL   3-5 days:    <15.0 mg/dL   6-29 days:   <15.0 mg/dL       BUN   19       21       Calcium   9.1       9.6       Chloride   108       108       Cholesterol Total         226  Comment: The National Cholesterol Education Program (NCEP) has set the  following guidelines (reference ranges) for Cholesterol:  Optimal.....................<200 mg/dL  Borderline High.............200-239 mg/dL  High........................> or = 240 mg/dL       CO2    23       23       Creatinine   0.8       0.9       eGFR   >60       >60       Eos #   0.21       0.06       Eos %   2.2       0.5       Glucose   148       123       HDL         61  Comment: The National Cholesterol Education Program (NCEP) has set the  following guidelines (reference values) for HDL Cholesterol:  Low...............<40 mg/dL  Optimal...........>60 mg/dL       HDL/Cholesterol Ratio         27.0       Hematocrit   44.7       45.0       Hemoglobin   14.8       15.3       Immature Grans (Abs)   0.05  Comment: Mild elevation in immature granulocytes is non specific and can be seen in a variety of conditions including stress response, acute inflammation, trauma and pregnancy. Correlation with other laboratory and clinical findings is essential.       0.05  Comment: Mild elevation in immature granulocytes is non specific and can be seen in a variety of conditions including stress response, acute inflammation, trauma and pregnancy. Correlation with other laboratory and clinical findings is essential.       Immature Granulocytes   0.5       0.4       INR         1.1  Comment: Coumadin Therapy:    2.0 - 3.0 for INR for all indicators except mechanical heart valves    and antiphospholipid syndromes which should use 2.5 - 3.5.       LDL Cholesterol         145.6  Comment: The National Cholesterol Education Program (NCEP) has set the  following guidelines (reference values) for LDL Cholesterol:  Optimal.......................<130 mg/dL  Borderline High...............130-159 mg/dL  High..........................160-189 mg/dL  Very High.....................>190 mg/dL         Lymph #   2.04       1.95       LYMPH %   21.1       15.1       Magnesium    1.7             MCH   30.8       31.2       MCHC   33.1       34.0       MCV   93       92       Mono #   1.05       1.02       Mono %   10.9       7.9       MPV   10.9       10.5       Neut #   6.3       9.8       Neut %   64.7       75.7       Non-HDL Cholesterol          165  Comment: Risk category and Non-HDL cholesterol goals:  Coronary heart disease (CHD)or equivalent (10-year risk of CHD >20%):  Non-HDL cholesterol goal     <130 mg/dL  Two or more CHD risk factors and 10-year risk of CHD <= 20%:  Non-HDL cholesterol goal     <160 mg/dL  0 to 1 CHD risk factor:  Non-HDL cholesterol goal     <190 mg/dL       nRBC   0       0       Phosphorus Level   4.0             Platelet Count   241       259       POCT Glucose 143     143   205         Potassium   3.8       4.2       PROTEIN TOTAL         6.8       PT         11.7       RBC   4.80       4.91       RDW   13.5       13.4       Sodium   139       141       Total Cholesterol/HDL Ratio         3.7       Triglycerides         97  Comment: The National Cholesterol Education Program (NCEP) has set the  following guidelines (reference values) for triglycerides:  Normal......................<150 mg/dL  Borderline High.............150-199 mg/dL  High........................200-499 mg/dL         TSH         3.228       WBC   9.67       12.88                              Significant Imaging: I have reviewed all pertinent imaging results/findings within the past 24 hours.

## 2025-07-20 NOTE — HOSPITAL COURSE
Patient was evaluated and closely monitored for stroke like symptoms. Patient's CT head was negative but follow up MRI showed acute ischemic stroke. Patient was started on ASA, Plavix, statin therapy, and Lovenox. On day one of admission patient said that her symptoms were not resolved but slightly improved. Neurology is following. Patient was seen by Neurology, recommendations were made for patient to continue dual antiplatelet therapy for 21 days, then transition to monotherapy with plavix. Patient was also instructed to continue to take atorvastatin 40 mg daily. Patient will be discharged with outpatient rehab ordered. Patient agreed she is not going to continue smoking. Patient was assessed on day of discharge, all questions were answered to patient and son's satisfaction. Patient is medically cleared for discharge with outpatient therapy and neuro follow up in 4 weeks.

## 2025-07-20 NOTE — SUBJECTIVE & OBJECTIVE
HPI:69 year old female with comorbid conditions of hypertension, diabetes, current smoker presents due to several days of left lower extremity numbness and weakness, left hand tingling and elevated blood pressure.  Symptoms started on Wednesday and have persisted.  Patient does not check BP regularly but on Wednesday she checked because she was not thinking clearly (was elevated to 170-210s).   Reports being under a lot of stress recently.  Denies headache, chills, fevers, recent travel, sick contact, chest pain, SOB, nausea/vomiting, diarrhea.  Denies facial numbness/drooping, slurred speech.  Reports compliance with antihypertensives.    Reports mother had 3 strokes and she  from the last one.    In ED blood pressure is in 180-200s systolic.  CT head revealed hypodensity in right basal gangla concerning for acute vs subacute stroke. Patient was given 325mg aspirin in ED.       Patient was evaluated and closely monitored for stroke like symptoms. Patient's CT head was negative but follow up MRI showed acute ischemic stroke. Patient was started on ASA, Plavix, statin therapy, and Lovenox. On day one of admission patient said that her symptoms were not resolved but slightly improved.    Patient continues to have L sided weakness. MRI explained- etiology of stroke is small vessel due to uncontrolled risk factors such cigarette smoking, hypertension, hyperlipidemia and diabetes.  Agree with dual antiplatelet therapy and statin for 21 days then monotherapy with Plavix that she take daily aspirin in the past.  Patient will need A1c ordered and followed up.         Images personally reviewed and interpreted:  Study: MRI Brain and MRA Brain & Neck  Study Interpretation: Acute ischemic infarction involving the anterior/rightward aspect of the brainstem at the pontomedullary junction.     MRA- neg for LVO    Additional studies reviewed:   Study: Cardiac_Neuro: 2D echo  Study Interpretation: neg for acute  process    Laboratory studies reviewed:  BMP:   Lab Results   Component Value Date     07/20/2025    K 3.8 07/20/2025     07/20/2025    CO2 23 07/20/2025    BUN 19 07/20/2025    CREATININE 0.8 07/20/2025    CALCIUM 9.1 07/20/2025     CBC:   Lab Results   Component Value Date    WBC 9.67 07/20/2025    RBC 4.80 07/20/2025    HGB 14.8 07/20/2025    HCT 44.7 07/20/2025     07/20/2025    MCV 93 07/20/2025    MCH 30.8 07/20/2025    MCHC 33.1 07/20/2025     Lipid Panel:   Lab Results   Component Value Date    CHOL 226 (H) 07/19/2025    LDLCALC 145.6 07/19/2025    HDL 61 07/19/2025    TRIG 97 07/19/2025     Coagulation:   Lab Results   Component Value Date    INR 1.1 07/19/2025    APTT 23.3 04/20/2015     Hgb A1C:   Lab Results   Component Value Date    HGBA1C 7.4 (H) 05/21/2025    HGBA1C 6.8 (H) 10/07/2020     TSH:   Lab Results   Component Value Date    TSH 3.228 07/19/2025       Documentation personally reviewed:  Notes: Notes: ED notes and H&P     Assessment and plan:  Patient is a 69-year-old female with uncontrolled hypertension, diabetes and hyperlipidemia and current cigarette smoking who presents to tele Neurology Clinic to a brainstem stroke causing left-sided weakness.  Prolonged discussion regarding vascular risk factor optimization.    Patient continues to have L sided weakness. MRI explained- etiology of stroke is small vessel due to uncontrolled risk factors such cigarette smoking, hypertension, hyperlipidemia and diabetes.  Agree with dual antiplatelet therapy and statin for 21 days then monotherapy with Plavix that she take daily aspirin in the past.  Patient will need A1c ordered and followed up.    Post charge discharge plan:  Clinic follow up: Vascular Neurology    Visit Type: in-person only  Timeframe: 4 weeks

## 2025-07-20 NOTE — PROGRESS NOTES
[facility] Medicine  Progress Note    Patient Name: Della Juarez  MRN: [id number]  Patient Class: IP- Inpatient   Admission Date: 2025  Length of Stay: 1 days  Attending Physician: Sanaz Langston MD  Primary Care Provider: Fatuma Hollins MD        Subjective     Principal Problem:CVA (cerebral vascular accident)        HPI:  69 year old female with comorbid conditions of hypertension, diabetes, current smoker presents due to several days of left lower extremity numbness and weakness, left hand tingling and elevated blood pressure.  Symptoms started on Wednesday and have persisted.  Patient does not check BP regularly but on Wednesday she checked because she was not thinking clearly (was elevated to 170-210s).   Reports being under a lot of stress recently.  Denies headache, chills, fevers, recent travel, sick contact, chest pain, SOB, nausea/vomiting, diarrhea.  Denies facial numbness/drooping, slurred speech.  Reports compliance with antihypertensives.    Reports mother had 3 strokes and she  from the last one.    In ED blood pressure is in 180-200s systolic.  CT head revealed hypodensity in right basal gangla concerning for acute vs subacute stroke. Patient was given 325mg aspirin in ED.    Overview/Hospital Course:  Patient was evaluated and closely monitored for stroke like symptoms. Patient's CT head was negative but follow up MRI showed acute ischemic stroke. Patient was started on ASA, Plavix, statin therapy, and Lovenox. On day one of admission patient said that her symptoms were not resolved but slightly improved. Neurology is following.     Interval History: Patient seen and examined today. No acute events overnight. Patient had no complaints this morning but a lot of questions and concerns regarding her current treatment. Patient is uncomfortable with taking atorvastatin due to unnamed side effects she has been informed of. Patient also stated that she gets an upset stomach  "with Aspirin and can only take Ecotrin, and she also initially stated that she did not want to be given Lovenox. Patient is requesting she receive the "newer" statin drugs with less side effects, she was unable to provide the names but says her son will have them in his phone. I explained at length she had an acute ischemic stroke and why her treatment was prescribed. Patient verbalized understanding, but still requests we switch her statin soon because of the "dangers" associated with stopping therapy and restarting new medication. Explained to patient this was not a concern but once we had her preferred drug we would review and consider alternative to atorvastatin.     Review of Systems   Constitutional:  Negative for chills and fever.   Eyes:  Negative for visual disturbance.   Respiratory:  Negative for chest tightness and shortness of breath.    Cardiovascular:  Negative for leg swelling.   Gastrointestinal:  Negative for diarrhea, nausea and vomiting.   Neurological:  Negative for dizziness, speech difficulty, light-headedness and headaches.     Objective:     Vital Signs (Most Recent):  Temp: 97.7 °F (36.5 °C) (07/20/25 0719)  Pulse: 70 (07/20/25 0740)  Resp: 16 (07/20/25 0740)  BP: (!) 172/81 (07/20/25 0719)  SpO2: 97 % (07/20/25 0740) Vital Signs (24h Range):  Temp:  [97.7 °F (36.5 °C)-98.8 °F (37.1 °C)] 97.7 °F (36.5 °C)  Pulse:  [68-94] 70  Resp:  [16-28] 16  SpO2:  [95 %-98 %] 97 %  BP: (166-215)/() 172/81     Weight: 102.2 kg (225 lb 5 oz)  Body mass index is 38.08 kg/m².    Intake/Output Summary (Last 24 hours) at 7/20/2025 1102  Last data filed at 7/20/2025 0944  Gross per 24 hour   Intake 300 ml   Output --   Net 300 ml         Physical Exam  Cardiovascular:      Rate and Rhythm: Normal rate and regular rhythm.      Pulses: Normal pulses.      Heart sounds: Normal heart sounds.   Pulmonary:      Effort: Pulmonary effort is normal.      Breath sounds: Normal breath sounds.   Abdominal:      " General: Bowel sounds are normal.      Palpations: Abdomen is soft.   Musculoskeletal:         General: Normal range of motion.   Skin:     General: Skin is warm and dry.   Neurological:      General: No focal deficit present.      Mental Status: She is alert and oriented to person, place, and time.   Psychiatric:         Attention and Perception: Attention normal.         Mood and Affect: Mood is anxious.         Speech: Speech normal.               Significant Labs: All pertinent labs within the past 24 hours have been reviewed.  Recent Lab Results  (Last 5 results in the past 24 hours)        07/20/25  0720   07/20/25  0628   07/20/25  0139   07/19/25  2211   07/19/25  1747        Albumin         4.2       ALP         67       ALT         36       Anion Gap   8       10       AST         27       Baso #   0.06       0.05       Basophil %   0.6       0.4       BILIRUBIN TOTAL         0.5  Comment: For infants and newborns, interpretation of results should be based   on gestational age, weight and in agreement with clinical   observations.    Premature Infant recommended reference ranges:   0-24 hours:  <8.0 mg/dL   24-48 hours: <12.0 mg/dL   3-5 days:    <15.0 mg/dL   6-29 days:   <15.0 mg/dL       BUN   19       21       Calcium   9.1       9.6       Chloride   108       108       Cholesterol Total         226  Comment: The National Cholesterol Education Program (NCEP) has set the  following guidelines (reference ranges) for Cholesterol:  Optimal.....................<200 mg/dL  Borderline High.............200-239 mg/dL  High........................> or = 240 mg/dL       CO2   23       23       Creatinine   0.8       0.9       eGFR   >60       >60       Eos #   0.21       0.06       Eos %   2.2       0.5       Glucose   148       123       HDL         61  Comment: The National Cholesterol Education Program (NCEP) has set the  following guidelines (reference values) for HDL Cholesterol:  Low...............<40  mg/dL  Optimal...........>60 mg/dL       HDL/Cholesterol Ratio         27.0       Hematocrit   44.7       45.0       Hemoglobin   14.8       15.3       Immature Grans (Abs)   0.05  Comment: Mild elevation in immature granulocytes is non specific and can be seen in a variety of conditions including stress response, acute inflammation, trauma and pregnancy. Correlation with other laboratory and clinical findings is essential.       0.05  Comment: Mild elevation in immature granulocytes is non specific and can be seen in a variety of conditions including stress response, acute inflammation, trauma and pregnancy. Correlation with other laboratory and clinical findings is essential.       Immature Granulocytes   0.5       0.4       INR         1.1  Comment: Coumadin Therapy:    2.0 - 3.0 for INR for all indicators except mechanical heart valves    and antiphospholipid syndromes which should use 2.5 - 3.5.       LDL Cholesterol         145.6  Comment: The National Cholesterol Education Program (NCEP) has set the  following guidelines (reference values) for LDL Cholesterol:  Optimal.......................<130 mg/dL  Borderline High...............130-159 mg/dL  High..........................160-189 mg/dL  Very High.....................>190 mg/dL         Lymph #   2.04       1.95       LYMPH %   21.1       15.1       Magnesium    1.7             MCH   30.8       31.2       MCHC   33.1       34.0       MCV   93       92       Mono #   1.05       1.02       Mono %   10.9       7.9       MPV   10.9       10.5       Neut #   6.3       9.8       Neut %   64.7       75.7       Non-HDL Cholesterol         165  Comment: Risk category and Non-HDL cholesterol goals:  Coronary heart disease (CHD)or equivalent (10-year risk of CHD >20%):  Non-HDL cholesterol goal     <130 mg/dL  Two or more CHD risk factors and 10-year risk of CHD <= 20%:  Non-HDL cholesterol goal     <160 mg/dL  0 to 1 CHD risk factor:  Non-HDL cholesterol goal      <190 mg/dL       nRBC   0       0       Phosphorus Level   4.0             Platelet Count   241       259       POCT Glucose 143     143   205         Potassium   3.8       4.2       PROTEIN TOTAL         6.8       PT         11.7       RBC   4.80       4.91       RDW   13.5       13.4       Sodium   139       141       Total Cholesterol/HDL Ratio         3.7       Triglycerides         97  Comment: The National Cholesterol Education Program (NCEP) has set the  following guidelines (reference values) for triglycerides:  Normal......................<150 mg/dL  Borderline High.............150-199 mg/dL  High........................200-499 mg/dL         TSH         3.228       WBC   9.67       12.88                              Significant Imaging: I have reviewed all pertinent imaging results/findings within the past 24 hours.      Assessment & Plan  CVA (cerebral vascular accident)  Patient found to have subacute vs acute stroke of right basal ganglia.     Antithrombotics for secondary stroke prevention: Antiplatelets: Aspirin: 81 mg daily  Clopidogrel: 75 mg daily    Statins for secondary stroke prevention and hyperlipidemia, if present:   Statins: Atorvastatin- 40 mg daily    Aggressive risk factor modification: HTN, Smoking, DM     Rehab efforts: The patient has been evaluated by a stroke team provider and the therapy needs have been fully considered based off the presenting complaints and exam findings. The following therapy evaluations are needed: PT evaluate and treat    Diagnostics ordered/pending: MRA head to assess vasculature, MRA neck/arch to assess vasculature, MRI head without contrast to assess brain parenchyma, TTE to assess cardiac function/status     VTE prophylaxis: Enoxaparin 40 mg SQ every 24 hours    BP parameters: Infarct: No intervention, SBP <220    - Follow vascular neurology consult recommendations  - Permissive hypertension - PRN IV labetalol for SBP > 220  - MRI showed Acute ischemic  infarction involving the anterior/rightward aspect of the brainstem at the pontomedullary junction.    - Obtain ECHO  - Continue DAPT  - High intensity statin   - PT evaluation  - Smoking cessation  Hypertension  Patient's blood pressure range in the last 24 hours was: BP  Min: 166/119  Max: 215/100.The patient's inpatient anti-hypertensive regimen is listed below:  Current Antihypertensives  amLODIPine tablet 2.5 mg, Daily, Oral  losartan tablet 100 mg, Daily, Oral  labetaloL injection 10 mg, Every 6 hours PRN, Intravenous    Plan  - BP is uncontrolled, will adjust as follows: allow permissive hypertension up to systolic of 220  - PRN IV labetalol for SBP > 220    Tobacco use  Nicotine patch PRN  Advised patient on importance of smoking cessation    Type 2 diabetes mellitus, without long-term current use of insulin  Hold oral antihyperglycemics  Insulin low dose sliding scale  POCT  Carb consistent diet    VTE Risk Mitigation (From admission, onward)           Ordered     enoxaparin injection 40 mg  Daily         07/19/25 2110     IP VTE HIGH RISK PATIENT  Once         07/19/25 2110     Place sequential compression device  Until discontinued         07/19/25 2110                    Discharge Planning   SHAE:      Code Status: Full Code   Medical Readiness for Discharge Date:                            NANCY TYLER NP  Department of Hospital Medicine   [facility]

## 2025-07-20 NOTE — PT/OT/SLP EVAL
"Physical Therapy Evaluation    Patient Name:  Della Juarez   MRN:  [id number]    Recommendations:     Discharge Recommendations: Low Intensity Therapy (needs OP PT, HH will NOT be sufficient)   Discharge Equipment Recommendations: walker, rolling   Barriers to discharge: None    Assessment:     Della Juarez is a 69 y.o. female admitted with a medical diagnosis of CVA (cerebral vascular accident).  She presents with the following impairments/functional limitations: weakness, impaired endurance, impaired sensation, impaired self care skills, impaired functional mobility, gait instability, impaired balance, decreased coordination, decreased upper extremity function, decreased lower extremity function, decreased safety awareness, abnormal tone, impaired coordination, impaired fine motor, other (comment) (mild. L drop foot, will need AFO to avoid falls)     Bed mobs with indep;  transfers with RW CGA;  amb x 100 feet with RW, mild L drop foot and "high stepping " LLE to clear foot;  noted numbness from below L knee to toes and L fingertips L hand.  Rec OP PT;  HH will not be enough for recovery from CVA.    Rehab Prognosis: Good; patient would benefit from acute skilled PT services to address these deficits and reach maximum level of function.    Recent Surgery: * No surgery found *      Plan:     During this hospitalization, patient to be seen daily to address the identified rehab impairments via gait training, therapeutic activities, therapeutic exercises, neuromuscular re-education and progress toward the following goals:    Plan of Care Expires:  08/20/25    Subjective     Chief Complaint: patient concerned about if she needs to have rehab ;  edu she is doing well enough to go home but will need OP PT for rehab after dc.  HH will not be enough.  Patient and son both report she has been under a lot of stress lately and its one reason for the high BP and this resultant stroke.  Rec son handle stressful person and let " patient have a break.  Does not need to deal with people causing high stress at this time.  He says he will definitely handle this situation.  Patient/Family Comments/goals: return to PLOF  Pain/Comfort:  Pain Rating 1: 0/10    Patients cultural, spiritual, [employer] conflicts given the current situation: no    Living Environment:  Lives alone, SSH, threshold step  Prior to admission, patients level of function was indep; worked PT prior.  Equipment used at home: none.  DME owned (not currently used): none.  Upon discharge, patient will have assistance from family.    Objective:     Communicated with patient/ nurse/ SW prior to session.  Patient found sitting edge of bed with bed alarm, peripheral IV, telemetry  upon PT entry to room.    General Precautions: Standard, fall  Orthopedic Precautions:N/A   Braces: N/A  Respiratory Status: Room air    Exams:  Cognitive Exam:  Patient is oriented to Person, Place, Time, and Situation  RLE ROM: WFL  RLE Strength: WFL  LLE ROM: WFL  LLE Strength: 3+  B hips are weak at 3+  Functional Mobility:  Bed Mobility:     Rolling Left:  independence  Scooting: independence  Supine to Sit: independence  Sit to Supine: independence  Transfers:     Sit to Stand:  contact guard assistance with rolling walker  Gait: x 100 feet RW with RW CGA, LLE with decr coord, mild drop foot, high stepping to clear L foot, unsteady  Balance: sit st good; dyn fair+;  stand st RW fair+, stand dyn RW fair      AM-PAC 6 CLICK MOBILITY  Total Score:20       Treatment & Education:  Edu on safety, fall prec and pacing;  edu on use call light for help;  edu on need to do therapy for best gains in first 6 months;  edu she may need an AFO L foot    Patient left sitting edge of bed with all lines intact, call button in reach, nurse notified, and son present.    GOALS:   Multidisciplinary Problems       Physical Therapy Goals          Problem: Physical Therapy    Goal Priority Disciplines Outcome Interventions    Physical Therapy Goal     PT, PT/OT     Description: Goals to be met by: 25     Patient will increase functional independence with mobility by performin. Supine to sit with Divide  2. Sit to stand transfer with Modified Divide  3. Gait  x 150 feet with Contact Guard Assistance using Rolling Walker.                          DME Justifications:  No DME recommended requiring DME justifications    History:     Past Medical History:   Diagnosis Date    Diabetes mellitus     Hypertension     Mental disorder     Anxiety       Past Surgical History:   Procedure Laterality Date     SECTION      x3    COSMETIC SURGERY      Scar on right side of face       Time Tracking:     PT Received On: 25  PT Start Time: 1200     PT Stop Time: 1230  PT Total Time (min): 30 min     Billable Minutes: Evaluation 15 and Gait Training 15      2025

## 2025-07-20 NOTE — ASSESSMENT & PLAN NOTE
Patient found to have subacute vs acute stroke of right basal ganglia.     Antithrombotics for secondary stroke prevention: Antiplatelets: Aspirin: 81 mg daily  Clopidogrel: 75 mg daily    Statins for secondary stroke prevention and hyperlipidemia, if present:   Statins: Atorvastatin- 40 mg daily    Aggressive risk factor modification: HTN, Smoking, DM     Rehab efforts: The patient has been evaluated by a stroke team provider and the therapy needs have been fully considered based off the presenting complaints and exam findings. The following therapy evaluations are needed: PT evaluate and treat    Diagnostics ordered/pending: MRA head to assess vasculature, MRA neck/arch to assess vasculature, MRI head without contrast to assess brain parenchyma, TTE to assess cardiac function/status     VTE prophylaxis: Enoxaparin 40 mg SQ every 24 hours    BP parameters: Infarct: No intervention, SBP <220    - Follow vascular neurology consult recommendations  - Permissive hypertension - PRN IV labetalol for SBP > 220  - MRI showed Acute ischemic infarction involving the anterior/rightward aspect of the brainstem at the pontomedullary junction.    - Obtain ECHO  - Continue DAPT  - High intensity statin   - PT evaluation  - Smoking cessation

## 2025-07-20 NOTE — HPI
69 year old female with comorbid conditions of hypertension, diabetes, current smoker presents due to several days of left lower extremity numbness and weakness, left hand tingling and elevated blood pressure.  Symptoms started on Wednesday and have persisted.  Patient does not check BP regularly but on Wednesday she checked because she was not thinking clearly (was elevated to 170-210s).   Reports being under a lot of stress recently.  Denies headache, chills, fevers, recent travel, sick contact, chest pain, SOB, nausea/vomiting, diarrhea.  Denies facial numbness/drooping, slurred speech.  Reports compliance with antihypertensives.    Reports mother had 3 strokes and she  from the last one.    In ED blood pressure is in 180-200s systolic.  CT head revealed hypodensity in right basal gangla concerning for acute vs subacute stroke. Patient was given 325mg aspirin in ED.

## 2025-07-20 NOTE — PLAN OF CARE
[facility]  Initial Discharge Assessment       Primary Care Provider: Fatuma Hollins MD    Admission Diagnosis: Acute focal neurological deficit [R29.818]    Admission Date: 7/19/2025  Expected Discharge Date:     Pt is a 69-year-old female who arrived from home with CVA (cerebral vascular accident)  problem. Information verified as correct on facesheet. The assessment was completed at the patient's bedside.  Pt lives with alone. Pt does not have a Living Will or Advance Directive. The Pt is oriented to person, places, and times.  Pt denies Coumadin, dialysis, DME, HH, and has not been readmitted into the hospital in the last thirty days.  Pt is capable of performing ADLs without assistance. Pt drives to and from medical appointments. Pt reports she takes medication as prescribed; [facility]  Pt verbalized plan to discharge home via family transport. Pt has no other needs to be addressed at this time. CM reviewed the chart and will continue to monitor.       Transition of Care Barriers: None    Payor: [employer] MANAGED MEDICARE / Plan: [employer] MEDICARE PLAN PPO / Product Type: Medicare Advantage /     Extended Emergency Contact Information  Primary Emergency Contact: Ivan Cummins   [place]  Mobile Phone: [phone]  Relation: Son  Preferred language: English   needed? No  Secondary Emergency Contact: Jason Oliveros   [place]  Mobile Phone: [phone]  Relation: Son  Preferred language: English   needed? No    Discharge Plan A: Home  Discharge Plan B: Home      [id number] [place] - [address]  [address]  [place] [address]  Phone: [phone] Fax: [phone]    [facility] - [place] - [address]  [address] [facility] [address]  Phone: [phone] Fax: [phone]      Initial Assessment (most recent)       Adult Discharge Assessment - 07/20/25 1313          Discharge Assessment     Assessment Type Discharge Planning Assessment     Confirmed/corrected address, phone number and insurance Yes     Confirmed Demographics Correct on Facesheet     Source of Information patient;health record     People in Home alone     Facility Arrived From: Home     Do you expect to return to your current living situation? Yes     Do you have help at home or someone to help you manage your care at home? Yes     Who are your caregiver(s) and their phone number(s)? Ivan Cummins (Son)  [phone] (Mobile)     Prior to hospitilization cognitive status: Alert/Oriented     Current cognitive status: Alert/Oriented     Walking or Climbing Stairs Difficulty no     Dressing/Bathing Difficulty no     Equipment Currently Used at Home none     Readmission within 30 days? No     Patient currently being followed by outpatient case management? No     Do you currently have service(s) that help you manage your care at home? No     Do you take prescription medications? Yes     Do you have prescription coverage? Yes     Do you have any problems affording any of your prescribed medications? No     Is the patient taking medications as prescribed? yes     Who is going to help you get home at discharge? sons     How do you get to doctors appointments? car, drives self     Are you on dialysis? No     Do you take coumadin? No     Discharge Plan A Home     Discharge Plan B Home     DME Needed Upon Discharge  none     Discharge Plan discussed with: Patient     Transition of Care Barriers None

## 2025-07-20 NOTE — SUBJECTIVE & OBJECTIVE
Past Medical History:   Diagnosis Date    Diabetes mellitus     Hypertension     Mental disorder     Anxiety       Past Surgical History:   Procedure Laterality Date     SECTION      x3    COSMETIC SURGERY      Scar on right side of face       Review of patient's allergies indicates:   Allergen Reactions    Penicillins      Other reaction(s): Vomiting  Other reaction(s): Swelling  Other reaction(s): Nausea  Other reaction(s): Hives       No current facility-administered medications on file prior to encounter.     Current Outpatient Medications on File Prior to Encounter   Medication Sig    ALPRAZolam (XANAX) 0.5 MG tablet Take 1 tablet (0.5 mg total) by mouth 2 (two) times daily.    amLODIPine (NORVASC) 2.5 MG tablet Take 1 tablet (2.5 mg total) by mouth once daily.    aspirin (ECOTRIN) 81 MG EC tablet Take 1 tablet (81 mg total) by mouth once daily.    losartan (COZAAR) 100 MG tablet Take 1 tablet (100 mg total) by mouth once daily.    metFORMIN (GLUCOPHAGE-XR) 500 MG ER 24hr tablet Take 1 tablet (500 mg total) by mouth 2 (two) times daily with meals.    multivitamin capsule Take 1 capsule by mouth once daily.    vitamin D (VITAMIN D3) 1000 units Tab Take 5,000 Units by mouth once daily.    blood sugar diagnostic Strp To check BG two times daily, to use with insurance preferred meter    blood-glucose meter kit To check BG two times daily, to use with insurance preferred meter    clotrimazole-betamethasone 1-0.05% (LOTRISONE) cream Apply topically 2 (two) times daily.    glimepiride (AMARYL) 2 MG tablet Take 1 tablet (2 mg total) by mouth 2 (two) times a day.    lancets Misc To check BG two times daily, to use with insurance preferred meter    [DISCONTINUED] ascorbic acid, vitamin C, (VITAMIN C) 500 MG tablet Take 500 mg by mouth once daily.    [DISCONTINUED] cyanocobalamin 2000 MCG tablet Take 2,000 mcg by mouth once daily.    [DISCONTINUED] zinc gluconate 50 mg tablet Take 50 mg by mouth once daily.      Family History       Problem Relation (Age of Onset)    Breast cancer Paternal Aunt    Diabetes Father, Maternal Grandmother    Hemochromatosis Mother    Hypertension Mother, Father, Maternal Grandmother    Stroke Mother, Maternal Grandmother          Tobacco Use    Smoking status: Some Days     Current packs/day: 0.00     Average packs/day: 0.5 packs/day for 11.0 years (5.5 ttl pk-yrs)     Types: Cigarettes     Start date:      Last attempt to quit: 2016     Years since quittin.5     Passive exposure: Past    Smokeless tobacco: Never   Vaping Use    Vaping status: Every Day   Substance and Sexual Activity    Alcohol use: No    Drug use: No    Sexual activity: Not Currently     Review of Systems   Constitutional:  Negative for chills and fever.   HENT:  Negative for hearing loss, sinus pain and sore throat.    Eyes:  Negative for visual disturbance.   Respiratory:  Negative for shortness of breath and wheezing.    Cardiovascular:  Negative for chest pain, palpitations and leg swelling.   Gastrointestinal:  Negative for abdominal pain, diarrhea, nausea and vomiting.   Endocrine: Negative for polyuria.   Genitourinary:  Negative for dysuria and hematuria.   Musculoskeletal:  Negative for back pain and myalgias.   Skin:  Negative for pallor and rash.   Neurological:  Positive for weakness and numbness. Negative for dizziness, seizures, syncope, facial asymmetry, light-headedness and headaches.   Psychiatric/Behavioral:  Negative for confusion.      Objective:     Vital Signs (Most Recent):  Temp: 98.8 °F (37.1 °C) (25 1716)  Pulse: 82 (25)  Resp: (!) 24 (25)  BP: (!) 203/101 (25)  SpO2: 97 % (25) Vital Signs (24h Range):  Temp:  [98.8 °F (37.1 °C)] 98.8 °F (37.1 °C)  Pulse:  [78-94] 82  Resp:  [19-28] 24  SpO2:  [96 %-98 %] 97 %  BP: (179-215)/() 203/101     Weight: 90.7 kg (200 lb)  Body mass index is 33.8 kg/m².     Physical Exam  Constitutional:      "  General: She is not in acute distress.     Appearance: Normal appearance.   HENT:      Head: Normocephalic and atraumatic.      Nose: No congestion or rhinorrhea.      Mouth/Throat:      Pharynx: No oropharyngeal exudate or posterior oropharyngeal erythema.   Eyes:      General: No scleral icterus.     Extraocular Movements: Extraocular movements intact.      Pupils: Pupils are equal, round, and reactive to light.   Cardiovascular:      Rate and Rhythm: Normal rate and regular rhythm.      Pulses: Normal pulses.      Heart sounds: Normal heart sounds. No murmur heard.  Pulmonary:      Effort: Pulmonary effort is normal. No respiratory distress.      Breath sounds: Normal breath sounds. No wheezing or rales.   Abdominal:      General: There is no distension.      Palpations: Abdomen is soft.      Tenderness: There is no abdominal tenderness.   Musculoskeletal:      Right lower leg: No edema.      Left lower leg: No edema.   Skin:     General: Skin is warm and dry.      Coloration: Skin is not jaundiced.      Findings: No bruising.   Neurological:      Mental Status: She is alert and oriented to person, place, and time.      Sensory: Sensory deficit present.      Motor: Weakness present.      Comments: Patient has mild weakness in left upper extremity and subjective numbness and tingling in fingertips.  Subjective numbness of left foot compared to right, reports subjective heaviness and numbness in left leg.    Cranial nerves intact  No drift in any extremity   Psychiatric:         Mood and Affect: Mood normal.              CRANIAL NERVES     CN III, IV, VI   Pupils are equal, round, and reactive to light.       Significant Labs: All pertinent labs within the past 24 hours have been reviewed.  A1C:   Recent Labs   Lab 05/21/25  0818   HGBA1C 7.4*     ABGs: No results for input(s): "PH", "PCO2", "HCO3", "POCSATURATED", "BE", "TOTALHB", "COHB", "METHB", "O2HB", "POCFIO2", "PO2" in the last 48 hours.  Bilirubin: " "  Recent Labs   Lab 07/19/25  1747   BILITOT 0.5     Blood Culture: No results for input(s): "LABBLOO" in the last 48 hours.  BMP:   Recent Labs   Lab 07/19/25 1747   *      K 4.2      CO2 23   BUN 21   CREATININE 0.9   CALCIUM 9.6     CBC:   Recent Labs   Lab 07/19/25 1747   WBC 12.88*   HGB 15.3   HCT 45.0        CMP:   Recent Labs   Lab 07/19/25 1747      K 4.2      CO2 23   *   BUN 21   CREATININE 0.9   CALCIUM 9.6   PROT 6.8   ALBUMIN 4.2   BILITOT 0.5   ALKPHOS 67   AST 27   ALT 36   ANIONGAP 10     Cardiac Markers: No results for input(s): "CKMB", "MYOGLOBIN", "BNP", "TROPISTAT" in the last 48 hours.  Coagulation:   Recent Labs   Lab 07/19/25 1747   INR 1.1     Lactic Acid: No results for input(s): "LACTATE" in the last 48 hours.  Lipase: No results for input(s): "LIPASE" in the last 48 hours.  Lipid Panel:   Recent Labs   Lab 07/19/25 1747   CHOL 226*   HDL 61   LDLCALC 145.6   TRIG 97   CHOLHDL 27.0     Magnesium: No results for input(s): "MG" in the last 48 hours.  Troponin: No results for input(s): "TROPONINI", "TROPONINIHS" in the last 48 hours.  TSH:   Recent Labs   Lab 07/19/25 1747   TSH 3.228     Urine Studies: No results for input(s): "COLORU", "APPEARANCEUA", "PHUR", "SPECGRAV", "PROTEINUA", "GLUCUA", "KETONESU", "BILIRUBINUA", "OCCULTUA", "NITRITE", "UROBILINOGEN", "LEUKOCYTESUR", "RBCUA", "WBCUA", "BACTERIA", "SQUAMEPITHEL", "HYALINECASTS" in the last 48 hours.    Invalid input(s): "WRIGHTSUR"    Significant Imaging: I have reviewed all pertinent imaging results/findings within the past 24 hours.  "

## 2025-07-20 NOTE — ASSESSMENT & PLAN NOTE
Patient's blood pressure range in the last 24 hours was: BP  Min: 179/90  Max: 215/100.The patient's inpatient anti-hypertensive regimen is listed below:  Current Antihypertensives  amLODIPine tablet 2.5 mg, Daily, Oral  losartan tablet 100 mg, Daily, Oral  labetaloL injection 10 mg, Every 6 hours PRN, Intravenous    Plan  - BP is uncontrolled, will adjust as follows: allow permissive hypertension up to systolic of 220  - PRN IV labetalol for SBP > 220

## 2025-07-20 NOTE — TELEMEDICINE CONSULT
[facility]  Vascular Neurology  Comprehensive Stroke Center  TeleVascular Neurology Interprofessional Consult Note           Consult Information  Consults    Consulting Provider:    Patient Location:  [facility] EMERGENCY DEPARTMENT     Summary of patient's symptoms:  Left lower extremity weakness x 3 days.     Images personally reviewed and interpreted:  Study: Head CT  Study Interpretation: R caudate subacute to chronic lesion; R internal capsule subacute lesion.     Assessment and plan:  Unable to age lesions on CT scan, likely represent acute and chronic strokes, both can result in current sx.    No acute interventions.  DAPT and statin.  May begin tx of BP if needed.  MRI and MRA brain tomorrow to confirm etiology of sx.  Rehab efforts.     I spent approximately 15 minutes on this encounter. More than half of that time was spent communicating with the consulting provider and coordinating patient care.           Je Raya MD  Vascular and Interventional Neurology  , Department of Neurology  Section Head, Vascular Neurology  Departments of Neurology, Neurosurgery and Radiology  [facility] [place], [place]        This encounter was conducted as an interprofessional communication between providers at the [facility] and vascular neurologist. The interaction was completed over the phone or via secure messaging (electronic medical record - [employer] Secure Chat).     Once this note was completed, a written copy was sent back to the provider via fax or electronic medical record.

## 2025-07-21 ENCOUNTER — TELEPHONE (OUTPATIENT)
Dept: FAMILY MEDICINE | Facility: CLINIC | Age: 70
End: 2025-07-21
Payer: MEDICARE

## 2025-07-21 VITALS
BODY MASS INDEX: 37.54 KG/M2 | TEMPERATURE: 98 F | WEIGHT: 225.31 LBS | HEART RATE: 74 BPM | SYSTOLIC BLOOD PRESSURE: 163 MMHG | OXYGEN SATURATION: 97 % | RESPIRATION RATE: 18 BRPM | DIASTOLIC BLOOD PRESSURE: 84 MMHG | HEIGHT: 65 IN

## 2025-07-21 LAB
ABSOLUTE EOSINOPHIL (SMH): 0.24 K/UL
ABSOLUTE MONOCYTE (SMH): 0.94 K/UL (ref 0.3–1)
ABSOLUTE NEUTROPHIL COUNT (SMH): 6.7 K/UL (ref 1.8–7.7)
ANION GAP (SMH): 7 MMOL/L (ref 8–16)
BASOPHILS # BLD AUTO: 0.07 K/UL
BASOPHILS NFR BLD AUTO: 0.7 %
BUN SERPL-MCNC: 21 MG/DL (ref 8–23)
CALCIUM SERPL-MCNC: 9.2 MG/DL (ref 8.7–10.5)
CHLORIDE SERPL-SCNC: 106 MMOL/L (ref 95–110)
CO2 SERPL-SCNC: 26 MMOL/L (ref 23–29)
CREAT SERPL-MCNC: 1 MG/DL (ref 0.5–1.4)
EAG (SMH): 157 MG/DL (ref 68–131)
ERYTHROCYTE [DISTWIDTH] IN BLOOD BY AUTOMATED COUNT: 13.5 % (ref 11.5–14.5)
GFR SERPLBLD CREATININE-BSD FMLA CKD-EPI: >60 ML/MIN/1.73/M2
GLUCOSE SERPL-MCNC: 152 MG/DL (ref 70–110)
HBA1C MFR BLD: 7.1 % (ref 4.5–6.2)
HCT VFR BLD AUTO: 46.7 % (ref 37–48.5)
HGB BLD-MCNC: 15.4 GM/DL (ref 12–16)
IMM GRANULOCYTES # BLD AUTO: 0.05 K/UL (ref 0–0.04)
IMM GRANULOCYTES NFR BLD AUTO: 0.5 % (ref 0–0.5)
LYMPHOCYTES # BLD AUTO: 1.71 K/UL (ref 1–4.8)
MAGNESIUM SERPL-MCNC: 2 MG/DL (ref 1.6–2.6)
MCH RBC QN AUTO: 30.9 PG (ref 27–31)
MCHC RBC AUTO-ENTMCNC: 33 G/DL (ref 32–36)
MCV RBC AUTO: 94 FL (ref 82–98)
NUCLEATED RBC (/100WBC) (SMH): 0 /100 WBC
OHS QRS DURATION: 78 MS
OHS QTC CALCULATION: 447 MS
PHOSPHATE SERPL-MCNC: 3.2 MG/DL (ref 2.7–4.5)
PLATELET # BLD AUTO: 246 K/UL (ref 150–450)
PMV BLD AUTO: 10.8 FL (ref 9.2–12.9)
POCT GLUCOSE: 160 MG/DL (ref 70–110)
POTASSIUM SERPL-SCNC: 4.1 MMOL/L (ref 3.5–5.1)
RBC # BLD AUTO: 4.98 M/UL (ref 4–5.4)
RELATIVE EOSINOPHIL (SMH): 2.5 % (ref 0–8)
RELATIVE LYMPHOCYTE (SMH): 17.6 % (ref 18–48)
RELATIVE MONOCYTE (SMH): 9.7 % (ref 4–15)
RELATIVE NEUTROPHIL (SMH): 69 % (ref 38–73)
SODIUM SERPL-SCNC: 139 MMOL/L (ref 136–145)
WBC # BLD AUTO: 9.7 K/UL (ref 3.9–12.7)

## 2025-07-21 PROCEDURE — 83735 ASSAY OF MAGNESIUM: CPT | Performed by: STUDENT IN AN ORGANIZED HEALTH CARE EDUCATION/TRAINING PROGRAM

## 2025-07-21 PROCEDURE — 97530 THERAPEUTIC ACTIVITIES: CPT

## 2025-07-21 PROCEDURE — 97116 GAIT TRAINING THERAPY: CPT | Mod: CQ

## 2025-07-21 PROCEDURE — 84100 ASSAY OF PHOSPHORUS: CPT | Performed by: STUDENT IN AN ORGANIZED HEALTH CARE EDUCATION/TRAINING PROGRAM

## 2025-07-21 PROCEDURE — 97166 OT EVAL MOD COMPLEX 45 MIN: CPT

## 2025-07-21 PROCEDURE — 83036 HEMOGLOBIN GLYCOSYLATED A1C: CPT

## 2025-07-21 PROCEDURE — 25000003 PHARM REV CODE 250: Performed by: STUDENT IN AN ORGANIZED HEALTH CARE EDUCATION/TRAINING PROGRAM

## 2025-07-21 PROCEDURE — 25000003 PHARM REV CODE 250: Performed by: NURSE PRACTITIONER

## 2025-07-21 PROCEDURE — 85025 COMPLETE CBC W/AUTO DIFF WBC: CPT | Performed by: STUDENT IN AN ORGANIZED HEALTH CARE EDUCATION/TRAINING PROGRAM

## 2025-07-21 PROCEDURE — 80048 BASIC METABOLIC PNL TOTAL CA: CPT | Performed by: STUDENT IN AN ORGANIZED HEALTH CARE EDUCATION/TRAINING PROGRAM

## 2025-07-21 PROCEDURE — 36415 COLL VENOUS BLD VENIPUNCTURE: CPT | Performed by: STUDENT IN AN ORGANIZED HEALTH CARE EDUCATION/TRAINING PROGRAM

## 2025-07-21 PROCEDURE — 94761 N-INVAS EAR/PLS OXIMETRY MLT: CPT

## 2025-07-21 PROCEDURE — 97535 SELF CARE MNGMENT TRAINING: CPT

## 2025-07-21 RX ORDER — AMLODIPINE BESYLATE 5 MG/1
5 TABLET ORAL DAILY
Status: DISCONTINUED | OUTPATIENT
Start: 2025-07-21 | End: 2025-07-21 | Stop reason: HOSPADM

## 2025-07-21 RX ORDER — CLOPIDOGREL BISULFATE 75 MG/1
75 TABLET ORAL DAILY
Qty: 30 TABLET | Refills: 11 | Status: SHIPPED | OUTPATIENT
Start: 2025-07-22 | End: 2026-07-22

## 2025-07-21 RX ORDER — ATORVASTATIN CALCIUM 40 MG/1
40 TABLET, FILM COATED ORAL NIGHTLY
Qty: 90 TABLET | Refills: 3 | Status: SHIPPED | OUTPATIENT
Start: 2025-07-21 | End: 2026-07-21

## 2025-07-21 RX ORDER — IBUPROFEN 200 MG
1 TABLET ORAL DAILY
Qty: 28 PATCH | Refills: 11 | Status: SHIPPED | OUTPATIENT
Start: 2025-07-21

## 2025-07-21 RX ADMIN — METFORMIN HYDROCHLORIDE 500 MG: 500 TABLET, EXTENDED RELEASE ORAL at 08:07

## 2025-07-21 RX ADMIN — CLOPIDOGREL 75 MG: 75 TABLET ORAL at 08:07

## 2025-07-21 RX ADMIN — ASPIRIN 81 MG: 81 TABLET ORAL at 08:07

## 2025-07-21 RX ADMIN — LOSARTAN POTASSIUM 100 MG: 50 TABLET, FILM COATED ORAL at 08:07

## 2025-07-21 RX ADMIN — AMLODIPINE BESYLATE 5 MG: 5 TABLET ORAL at 04:07

## 2025-07-21 NOTE — DISCHARGE SUMMARY
[facility] Medicine  Discharge Summary      Patient Name: Della Juarez  MRN: [id number]  [facility]: [id number]  Patient Class: IP- Inpatient  Admission Date: 2025  Hospital Length of Stay: 2 days  Discharge Date and Time: 2025 1:05 PM  Attending Physician: Sanaz Langston MD   Discharging Provider: NANCY TYLER NP  Primary Care Provider: Fatuma Hollins MD    Primary Care Team: Networked reference to record PCT     HPI:   69 year old female with comorbid conditions of hypertension, diabetes, current smoker presents due to several days of left lower extremity numbness and weakness, left hand tingling and elevated blood pressure.  Symptoms started on Wednesday and have persisted.  Patient does not check BP regularly but on Wednesday she checked because she was not thinking clearly (was elevated to 170-210s).   Reports being under a lot of stress recently.  Denies headache, chills, fevers, recent travel, sick contact, chest pain, SOB, nausea/vomiting, diarrhea.  Denies facial numbness/drooping, slurred speech.  Reports compliance with antihypertensives.    Reports mother had 3 strokes and she  from the last one.    In ED blood pressure is in 180-200s systolic.  CT head revealed hypodensity in right basal gangla concerning for acute vs subacute stroke. Patient was given 325mg aspirin in ED.    * No surgery found *      Hospital Course:   Patient was evaluated and closely monitored for stroke like symptoms. Patient's CT head was negative but follow up MRI showed acute ischemic stroke. Patient was started on ASA, Plavix, statin therapy, and Lovenox. On day one of admission patient said that her symptoms were not resolved but slightly improved. Neurology is following. Patient was seen by Neurology, recommendations were made for patient to continue dual antiplatelet therapy for 21 days, then transition to monotherapy with plavix. Patient was also instructed to continue to take atorvastatin  40 mg daily. Patient will be discharged with outpatient rehab ordered. Patient agreed she is not going to continue smoking. Patient was assessed on day of discharge, all questions were answered to patient and son's satisfaction. Patient is medically cleared for discharge with outpatient therapy and neuro follow up in 4 weeks.      Goals of Care Treatment Preferences:  Code Status: Full Code         Consults:   Consults (From admission, onward)          Status Ordering Provider     Inpatient Consult to Neurology Services (General Neurology)  Once        Provider:  Jefferson Coelho MD    Completed DASHA LOZADA     Inpatient consult to Vascular Neurology  Once        Provider:  Je Raya MD    Acknowledged RODOLFO DECKER            Assessment & Plan  CVA (cerebral vascular accident)  Patient found to have subacute vs acute stroke of right basal ganglia.     Antithrombotics for secondary stroke prevention: Antiplatelets: Aspirin: 81 mg daily  Clopidogrel: 75 mg daily    Statins for secondary stroke prevention and hyperlipidemia, if present:   Statins: Atorvastatin- 40 mg daily    Aggressive risk factor modification: HTN, Smoking, DM     Rehab efforts: The patient has been evaluated by a stroke team provider and the therapy needs have been fully considered based off the presenting complaints and exam findings. The following therapy evaluations are needed: PT evaluate and treat    Diagnostics ordered/pending: MRA head to assess vasculature, MRA neck/arch to assess vasculature, MRI head without contrast to assess brain parenchyma, TTE to assess cardiac function/status     VTE prophylaxis: Enoxaparin 40 mg SQ every 24 hours    BP parameters: Infarct: No intervention, SBP <220    Patient was seen by Neurology, recommendations were made for patient to continue dual antiplatelet therapy for 21 days, then transition to monotherapy with plavix. Patient was also instructed to continue to take atorvastatin 40 mg  "daily.  Hypertension  Patient's blood pressure range in the last 24 hours was: BP  Min: 137/83  Max: 178/95.The patient's inpatient anti-hypertensive regimen is listed below:  Current Antihypertensives  losartan tablet 100 mg, Daily, Oral  labetaloL injection 10 mg, Every 6 hours PRN, Intravenous  amLODIPine tablet 5 mg, Daily, Oral      Tobacco use  Smoking cessation referral and nicotine patch ordered      Type 2 diabetes mellitus, without long-term current use of insulin  Carb consistent diet    Final Active Diagnoses:    Diagnosis Date Noted POA    PRINCIPAL PROBLEM:  CVA (cerebral vascular accident) [I63.9] 07/19/2025 Yes    Type 2 diabetes mellitus, without long-term current use of insulin [E11.9] 07/19/2025 Yes    Tobacco use [Z72.0] 04/21/2015 Yes    Hypertension [I10] 04/21/2015 Yes      Problems Resolved During this Admission:       Discharged Condition: good    Disposition: Home or Self Care    Follow Up:   Follow-up Information       Fatuma Hollins MD. Schedule an appointment as soon as possible for a visit in 1 week(s).    Specialty: Family Medicine  Contact information:  [address] [address]  [phone]                           Patient Instructions:      ANKLE FOOT ORTHOSIS FOR HOME USE     Order Specific Question Answer Comments   Type of AFO to be filled? Solid ankle AFO Left foot   Height: 5' 4.5" (1.638 m)    Weight: 102.2 kg (225 lb 5 oz)    Does patient have medical equipment at home? none      WALKER FOR HOME USE     Order Specific Question Answer Comments   Type of Walker: Adult (5'4"-6'6")    With wheels? No    Height: 5' 4.5" (1.638 m)    Weight: 102.2 kg (225 lb 5 oz)    Length of need (1-99 months): 99    Does patient have medical equipment at home? none    Please check all that apply: Patient's condition impairs ambulation.      Ambulatory referral/consult to Smoking Cessation Program   Standing Status: Future   Referral Priority: Routine Referral Type: " Consultation   Referral Reason: Specialty Services Required   Requested Specialty: CTTS   Number of Visits Requested: 1     Ambulatory referral/consult to Neurology   Standing Status: Future   Referral Priority: Routine Referral Type: Consultation   Referral Reason: Specialty Services Required   Requested Specialty: Neurology   Number of Visits Requested: 1     Diet diabetic     Notify your health care provider if you experience any of the following:  temperature >100.4     Notify your health care provider if you experience any of the following:  persistent nausea and vomiting or diarrhea     Notify your health care provider if you experience any of the following:  severe uncontrolled pain     Notify your health care provider if you experience any of the following:  difficulty breathing or increased cough     Notify your health care provider if you experience any of the following:  severe persistent headache     Notify your health care provider if you experience any of the following:  persistent dizziness, light-headedness, or visual disturbances     Notify your health care provider if you experience any of the following:  increased confusion or weakness     Activity as tolerated       Significant Diagnostic Studies: Labs: All labs within the past 24 hours have been reviewed    Pending Diagnostic Studies:       None           Medications:  Reconciled Home Medications:      Medication List        START taking these medications      atorvastatin 40 MG tablet  Commonly known as: LIPITOR  Take 1 tablet (40 mg total) by mouth every evening.     clopidogreL 75 mg tablet  Commonly known as: PLAVIX  Take 1 tablet (75 mg total) by mouth once daily.  Start taking on: July 22, 2025     nicotine 21 mg/24 hr  Commonly known as: NICODERM CQ  Place 1 patch onto the skin once daily.            CONTINUE taking these medications      ALPRAZolam 0.5 MG tablet  Commonly known as: XANAX  Take 1 tablet (0.5 mg total) by mouth 2 (two)  times daily.     amLODIPine 2.5 MG tablet  Commonly known as: NORVASC  Take 1 tablet (2.5 mg total) by mouth once daily.     aspirin 81 MG EC tablet  Commonly known as: ECOTRIN  Take 1 tablet (81 mg total) by mouth once daily.     blood sugar diagnostic Strp  To check BG two times daily, to use with insurance preferred meter     blood-glucose meter kit  To check BG two times daily, to use with insurance preferred meter     clotrimazole-betamethasone 1-0.05% cream  Commonly known as: LOTRISONE  Apply topically 2 (two) times daily.     glimepiride 2 MG tablet  Commonly known as: AMARYL  Take 1 tablet (2 mg total) by mouth 2 (two) times a day.     lancets Misc  To check BG two times daily, to use with insurance preferred meter     losartan 100 MG tablet  Commonly known as: COZAAR  Take 1 tablet (100 mg total) by mouth once daily.     metFORMIN 500 MG ER 24hr tablet  Commonly known as: GLUCOPHAGE-XR  Take 1 tablet (500 mg total) by mouth 2 (two) times daily with meals.     multivitamin capsule  Take 1 capsule by mouth once daily.     vitamin D 1000 units Tab  Commonly known as: VITAMIN D3  Take 5,000 Units by mouth once daily.              Indwelling Lines/Drains at time of discharge:   Lines/Drains/Airways       None                       Time spent on the discharge of patient: 41 minutes         NANCY TYLER NP  Department of Hospital Medicine  [facility]

## 2025-07-21 NOTE — TELEPHONE ENCOUNTER
----- Message from  Chela sent at 7/21/2025  4:13 PM CDT -----  Regarding: DC appt call her back please  I spoke with pt an encouraged her to see the NP for a follow- up then se her PCP following for a routine appt.    Thanks,   Chela Saba RN

## 2025-07-21 NOTE — PT/OT/SLP EVAL
Occupational Therapy   Evaluation, tx    Name: Della Juarez  MRN: [id number]  Admitting Diagnosis: CVA (cerebral vascular accident)  Recent Surgery: * No surgery found *      Recommendations:     Discharge Recommendations: Low Intensity Therapy (OPOT neuro rehab LUE)  Discharge Equipment Recommendations:  bath bench  Barriers to discharge:  Decreased caregiver support    Assessment:     Della Juarez is a 69 y.o. female with a medical diagnosis of CVA (cerebral vascular accident).  She presents with L hemiparesis , hemisensory impairments. Performance deficits affecting function: weakness, impaired sensation, impaired self care skills, impaired functional mobility, gait instability, impaired balance, decreased coordination, decreased upper extremity function, decreased lower extremity function, decreased safety awareness, decreased ROM, impaired coordination, impaired fine motor.      Rehab Prognosis: Good; patient would benefit from acute skilled OT services to address these deficits and reach maximum level of function.       Plan:     Patient to be seen   to address the above listed problems via neuromuscular re-education, therapeutic exercises, therapeutic activities, self-care/home management  Plan of Care Expires:    Plan of Care Reviewed with: patient    Subjective     Chief Complaint: L arm and L leg heaviness, diminished sensation.   Patient/Family Comments/goals: I don;t know how I will take care of myself.    Occupational Profile:  Living Environment: pt lives alone SSH w/ THE' t/s combo  Previous level of function: Indep ADLS, IADLS, drives, works  Roles and Routines: active worker,   Equipment Used at Home: none  Assistance upon Discharge: son and daughter coming in from OOT to help pt while she is recovering    Pain/Comfort:  Pain Rating 1: 0/10    Patients cultural, spiritual, Confucianist conflicts given the current situation: no    Objective:     Communicated with: nurse prior to session.   Patient found up in chair with   upon OT entry to room.    General Precautions: Standard, fall, diabetic  Orthopedic Precautions: N/A  Braces: N/A  Respiratory Status: Room air    Occupational Performance:    Functional Mobility/Transfers:  Patient completed Sit <> Stand Transfer with stand by assistance and vc for proper HP  with  rolling walker   Functional Mobility: ambulated 12ft SBA w/ RW; L toe drag; vc  for foot clearance for safety;  L dorsiflexion weakness    Activities of Daily Living:  Feeding:  modified independence    Grooming: stand by assistance to wash hands standing at sink inside RW  Upper Body Dressing: stand by assistance to don bra and pullover w/ vc for amanda tech to thread LUE into sleeve first  Lower Body Dressing: minimum assistance to thread LLE into pants, pullover L hip  and fasteners  Toileting: minimum assistance for L side clothes management    Cognitive/Visual Perceptual:  Cognitive/Psychosocial Skills:     -       Oriented to: Person, Place, Time, and Situation   -       Follows Commands/attention:Follows multistep  commands  -       Communication: clear/fluent  -       Memory: No Deficits noted  -       Safety awareness/insight to disability: intact   -       Mood/Affect/Coping skills/emotional control: Cooperative and Flat affect  Visual/Perceptual:      -Intact glasses    Physical Exam:  Balance:    -       sitting; good dynamic good-  standing; fair plus  dynamic: fair  Postural examination/scapula alignment:    -       No postural abnormalities identified  Sensation:    -       Impaired  reports numbness LLE and mild in L hand/fingers; intact 3/3 light touch l fingers  Motor Planning:    -       intact  Dominant hand:    -       right  Upper Extremity Range of Motion:     -       Right Upper Extremity: WNL  -       Left Upper Extremity: WFL except shld 90  Upper Extremity Strength:    -       Right Upper Extremity: WNL  -       Left Upper Extremity: Deficits: shld 3-/5, el;bow  3+/5, wrist 3+/5   Strength:    -       Right Upper Extremity: WNL  -       Left Upper Extremity: Deficits: fair plus  Fine Motor Coordination:    -       Impaired  Left hand thumb/finger opposition skills fair and Left hand, manipulation of objects poor  Neurological:    -       low tone LLE -weak dorsiflexion  Gross motor coordination; LUE drift, heaviness    AMPAC 6 Click ADL:  AMPAC Total Score: 20    Treatment & Education:  Pt seen for safe self care activities and fx mobility retraining as stated above. Domenic tech  All questions/concerns addressed within scope.  Call staff for BTB/OOB assist. Call bell use explained. Pt acknowledged.  Purpose of OT and POC  Impt of OOB activity and sitting UIC explained to pt to prevent negative effects of prolonged bed rest. Pt verbalized understanding and agreeable to sit UIC.   Dc planning: TTB for safe bathing, purchase place options discussed.   LUE AROM ex gravity eliminated -table slide, educated on being cautious around stoves, udo not cut w/ sharp knives, test temperature of water w/ RUE first due to hyposensitivity On LUE      Patient left up in chair with all lines intact and call button in reach    GOALS:   Multidisciplinary Problems       Occupational Therapy Goals       Not on file                    DME Justifications:  No DME recommended requiring DME justifications    History:     Past Medical History:   Diagnosis Date    Diabetes mellitus     Hypertension     Mental disorder     Anxiety         Past Surgical History:   Procedure Laterality Date     SECTION      x3    COSMETIC SURGERY      Scar on right side of face       Time Tracking:     OT Date of Treatment: 25  OT Start Time:   OT Stop Time:   OT Total Time (min): 32 min    Billable Minutes:Evaluation 9  Self Care/Home Management 15  Therapeutic Activity 8  Total Time 32    2025

## 2025-07-21 NOTE — CARE UPDATE
07/20/25 2143   Patient Assessment/Suction   Level of Consciousness (AVPU) alert   Respiratory Effort Normal;Unlabored   Expansion/Accessory Muscles/Retractions no use of accessory muscles;no retractions;expansion symmetric   Rhythm/Pattern, Respiratory unlabored;pattern regular;depth regular   PRE-TX-O2   Device (Oxygen Therapy) room air   SpO2 97 %   Pulse Oximetry Type Intermittent   $ Pulse Oximetry - Multiple Charge Pulse Oximetry - Multiple   Pulse 80   Resp 16   Education   $ Education Oxygen;15 min

## 2025-07-21 NOTE — TELEPHONE ENCOUNTER
Next available appt with care team July 30th. Please assist with scheduling a sooner hospital follow up appt. Within 3 days of discharge

## 2025-07-21 NOTE — PLAN OF CARE
07/21/25 1624   Final Note   Assessment Type Final Discharge Note   Anticipated Discharge Disposition Home   What phone number can be called within the next 1-3 days to see how you are doing after discharge?   ([phone])     Pt clear for DC from case management standpoint. Discharging to home, son to transport pt when he gets off work. RW has been delivered. Pt has been contacted for PCP DC F/U, she is did not make appt. I spoke with her and educated her on why she heeds to make this F/u and sent in-basket back to call her back.. Pt has meds delivered, no additional CM needs. Referrals sent to [facility] and Outpt Pt/OT

## 2025-07-21 NOTE — ASSESSMENT & PLAN NOTE
Patient's blood pressure range in the last 24 hours was: BP  Min: 137/83  Max: 178/95.The patient's inpatient anti-hypertensive regimen is listed below:  Current Antihypertensives  losartan tablet 100 mg, Daily, Oral  labetaloL injection 10 mg, Every 6 hours PRN, Intravenous  amLODIPine tablet 5 mg, Daily, Oral

## 2025-07-21 NOTE — PLAN OF CARE
07/21/25 1610   Post-Acute Status   Post-Acute Authorization HME   HME Status (!) Pending Delivery   Hospital Resources/Appts/Education Provided Post-Acute resouces added to AVS     Rolling walker order sent to [facility],  spoke with [facility], they accept pt insurance and they can deliver after 5p.  Inbasket sent for PCP appt, they called pt to schedule and she wasn't sure about seeing NP advised pt to see NP for F/U.  Referral entered for Vascular neurology and outpt Pt/OT

## 2025-07-21 NOTE — TELEPHONE ENCOUNTER
----- Message from  Chela sent at 7/21/2025  2:40 PM CDT -----  Regarding: DC will need Hosp F/U  Date: 07/21/2025      Patient: Della Juarez  YOB: 1955    RE: [facility] Admission     Admit Diagnosis: CVA      Della Juarez is being discharged from the hospital today.     [ ] The patient does not currently have a primary care provider.     [ ] The patient has a diagnosis of congestive heart failure.     Pt has an [facility] PCP( Dr. Fatuma Hollins)      Transportation Needs:  @[facility]@        A follow-up appointment has been recommended within 3 days of discharge to support post-discharge management and reduce risk of admission.       Sincerely,   Chela Galarza

## 2025-07-21 NOTE — RESPIRATORY THERAPY
07/21/25 0756   Patient Assessment/Suction   Level of Consciousness (AVPU) alert   Respiratory Effort Unlabored   PRE-TX-O2   Device (Oxygen Therapy) room air   SpO2 98 %   Pulse Oximetry Type Intermittent   $ Pulse Oximetry - Multiple Charge Pulse Oximetry - Multiple   Pulse 72   Resp 18

## 2025-07-21 NOTE — PLAN OF CARE
[facility] faxed patient information to [facility] regarding orders for rolling walker.

## 2025-07-21 NOTE — PLAN OF CARE
Per KRISTIE York, patient information was sent to [facility] regarding rolling walker and expected delivery is around 5pm.

## 2025-07-21 NOTE — PT/OT/SLP PROGRESS
Physical Therapy Treatment    Patient Name:  Della Juarez   MRN:  [id number]    Recommendations:     Discharge Recommendations: Low Intensity Therapy (needs OP PT, HH will NOT be sufficient)  Discharge Equipment Recommendations: walker, rolling  Barriers to discharge: None    Assessment:     Della Juarez is a 69 y.o. female admitted with a medical diagnosis of CVA (cerebral vascular accident).  She presents with the following impairments/functional limitations: impaired endurance, weakness, impaired self care skills, impaired sensation, impaired functional mobility, gait instability, decreased lower extremity function, decreased upper extremity function, decreased safety awareness, impaired cardiopulmonary response to activity, impaired fine motor, impaired coordination . Pt presents up in chair. She reports difficulty sleeping in her bed and she feels that his is inhibiting her recovery. Pt continues to present with impaired sensation to the L LE. Gait trial completed requiring rw and CGA. Noted decreased L foot clearance secondary to foot drop requiring increased step hip flexion to adequately clear her foot. Pt would benefit from AFO to safely ambulate at this time.     Rehab Prognosis: Fair; patient would benefit from acute skilled PT services to address these deficits and reach maximum level of function.    Recent Surgery: * No surgery found *      Plan:     During this hospitalization, patient to be seen daily to address the identified rehab impairments via gait training, therapeutic activities, therapeutic exercises, neuromuscular re-education and progress toward the following goals:    Plan of Care Expires:  08/20/25    Subjective     Chief Complaint: Not sleeping well  Patient/Family Comments/goals: Return to PLOF.   Pain/Comfort:  Pain Rating 1: 0/10      Objective:     Communicated with RN prior to session.  Patient found up in chair with telemetry, peripheral IV upon PT entry to room.     General  Precautions: Standard, fall  Orthopedic Precautions: N/A  Braces: N/A  Respiratory Status: Room air     Functional Mobility:  Transfers:     Sit to Stand:  contact guard assistance with rolling walker  Gait: 210 feet CGA with rw      AM-PAC 6 CLICK MOBILITY          Treatment & Education:  Pt was educated on the following: call light use, importance of OOB activity and functional mobility to negate the negative effects of prolonged bed rest during this hospitalization, safe transfers/ambulation and discharge planning recommendations/options.     Patient left up in chair with all lines intact, call button in reach, and CM present..    GOALS:   Multidisciplinary Problems       Physical Therapy Goals          Problem: Physical Therapy    Goal Priority Disciplines Outcome Interventions   Physical Therapy Goal     PT, PT/OT     Description: Goals to be met by: 25     Patient will increase functional independence with mobility by performin. Supine to sit with [place]  2. Sit to stand transfer with Modified [place]  3. Gait  x 150 feet with Contact Guard Assistance using Rolling Walker.                          DME Justifications:   Della's mobility limitation cannot be sufficiently resolved by the use of a cane. Her functional mobility deficit can be sufficiently resolved with the use of a Rolling Walker. Patient's mobility limitation significantly impairs their ability to participate in one of more activities of daily living.  The use of a RW will significantly improve the patient's ability to participate in MRADLS and the patient will use it on regular basis in the home.    Time Tracking:     PT Received On: 25  PT Start Time: 1103     PT Stop Time: 1131  PT Total Time (min): 28 min     Billable Minutes: Gait Training 28    Treatment Type: Treatment  PT/PTA: PTA     Number of PTA visits since last PT visit: 2025

## 2025-07-21 NOTE — ASSESSMENT & PLAN NOTE
Patient found to have subacute vs acute stroke of right basal ganglia.     Antithrombotics for secondary stroke prevention: Antiplatelets: Aspirin: 81 mg daily  Clopidogrel: 75 mg daily    Statins for secondary stroke prevention and hyperlipidemia, if present:   Statins: Atorvastatin- 40 mg daily    Aggressive risk factor modification: HTN, Smoking, DM     Rehab efforts: The patient has been evaluated by a stroke team provider and the therapy needs have been fully considered based off the presenting complaints and exam findings. The following therapy evaluations are needed: PT evaluate and treat    Diagnostics ordered/pending: MRA head to assess vasculature, MRA neck/arch to assess vasculature, MRI head without contrast to assess brain parenchyma, TTE to assess cardiac function/status     VTE prophylaxis: Enoxaparin 40 mg SQ every 24 hours    BP parameters: Infarct: No intervention, SBP <220    Patient was seen by Neurology, recommendations were made for patient to continue dual antiplatelet therapy for 21 days, then transition to monotherapy with plavix. Patient was also instructed to continue to take atorvastatin 40 mg daily.

## 2025-07-22 ENCOUNTER — PATIENT OUTREACH (OUTPATIENT)
Dept: FAMILY MEDICINE | Facility: CLINIC | Age: 70
End: 2025-07-22
Payer: MEDICARE

## 2025-07-22 NOTE — PROGRESS NOTES
Discharge Information     Discharge Date:   7/21/25    Primary Discharge Diagnosis:  CVA      Discharge Summary:  Reviewed      Medication & Order Review     Were medication changes made or new medications added?   Yes    If so, has the patient filled the prescriptions?  Yes     Was Home Health ordered? no    If so, has Home Health contacted patient and/or initiated services?  No    Name of Home Health Agency? N/A    Durable Medical Equipment ordered?  yes    If so, has the DME provider contacted patient and delivered equipment?  N/A    Follow Up               Any problems since discharge? No    How is the patient feeling since returning home?      Have you set up recommended follow up appointments?    Yvette Castro     Schedule Hospital Follow-up appointment within 7-14 days   Notes:  Spoke with pt states she is doing well. Denies any needs at this time. States she has medications. No HH ordered. Appt scheduled with Yvette tomorrow due to Dr. Hollins being out of the office.             Lily Zelaya

## 2025-07-22 NOTE — TELEPHONE ENCOUNTER
Spoke with pt states she is doing well. Denies any needs at this time. States she has medications. No HH ordered. Appt scheduled with Yvette tomorrow due to Dr. Hollins being out of the office.

## 2025-07-22 NOTE — NURSING
Pt wheeled out via wheelchair with family member. IV and tele monitor removed from previous nurse. Discharge instructions and medications given and explained by previous nursed. No acute distress noted

## 2025-07-23 ENCOUNTER — OFFICE VISIT (OUTPATIENT)
Dept: FAMILY MEDICINE | Facility: CLINIC | Age: 70
End: 2025-07-23
Payer: MEDICARE

## 2025-07-23 VITALS
HEIGHT: 65 IN | HEART RATE: 94 BPM | BODY MASS INDEX: 38.08 KG/M2 | DIASTOLIC BLOOD PRESSURE: 88 MMHG | OXYGEN SATURATION: 99 % | SYSTOLIC BLOOD PRESSURE: 176 MMHG

## 2025-07-23 DIAGNOSIS — R53.1 LEFT-SIDED WEAKNESS: ICD-10-CM

## 2025-07-23 DIAGNOSIS — E11.59 TYPE 2 DIABETES MELLITUS WITH OTHER CIRCULATORY COMPLICATION, WITHOUT LONG-TERM CURRENT USE OF INSULIN: ICD-10-CM

## 2025-07-23 DIAGNOSIS — I10 ESSENTIAL HYPERTENSION: ICD-10-CM

## 2025-07-23 DIAGNOSIS — Z09 HOSPITAL DISCHARGE FOLLOW-UP: Primary | ICD-10-CM

## 2025-07-23 DIAGNOSIS — R06.2 WHEEZING: ICD-10-CM

## 2025-07-23 DIAGNOSIS — I63.9 ISCHEMIC STROKE: ICD-10-CM

## 2025-07-23 DIAGNOSIS — Z87.891 PERSONAL HISTORY OF NICOTINE DEPENDENCE: ICD-10-CM

## 2025-07-23 PROCEDURE — 3077F SYST BP >= 140 MM HG: CPT | Mod: CPTII,S$GLB,,

## 2025-07-23 PROCEDURE — 99496 TRANSJ CARE MGMT HIGH F2F 7D: CPT | Mod: S$GLB,,,

## 2025-07-23 PROCEDURE — 1101F PT FALLS ASSESS-DOCD LE1/YR: CPT | Mod: CPTII,S$GLB,,

## 2025-07-23 PROCEDURE — 4010F ACE/ARB THERAPY RXD/TAKEN: CPT | Mod: CPTII,S$GLB,,

## 2025-07-23 PROCEDURE — 1126F AMNT PAIN NOTED NONE PRSNT: CPT | Mod: CPTII,S$GLB,,

## 2025-07-23 PROCEDURE — 3066F NEPHROPATHY DOC TX: CPT | Mod: CPTII,S$GLB,,

## 2025-07-23 PROCEDURE — 3051F HG A1C>EQUAL 7.0%<8.0%: CPT | Mod: CPTII,S$GLB,,

## 2025-07-23 PROCEDURE — 3061F NEG MICROALBUMINURIA REV: CPT | Mod: CPTII,S$GLB,,

## 2025-07-23 PROCEDURE — 3288F FALL RISK ASSESSMENT DOCD: CPT | Mod: CPTII,S$GLB,,

## 2025-07-23 PROCEDURE — 3079F DIAST BP 80-89 MM HG: CPT | Mod: CPTII,S$GLB,,

## 2025-07-23 RX ORDER — ALBUTEROL SULFATE 90 UG/1
1 INHALANT RESPIRATORY (INHALATION) EVERY 6 HOURS PRN
Qty: 18 G | Refills: 0 | Status: SHIPPED | OUTPATIENT
Start: 2025-07-23

## 2025-07-23 RX ORDER — ATORVASTATIN CALCIUM 40 MG/1
40 TABLET, FILM COATED ORAL NIGHTLY
Qty: 90 TABLET | Refills: 3 | Status: CANCELLED | OUTPATIENT
Start: 2025-07-23 | End: 2026-07-23

## 2025-07-23 RX ORDER — CLOPIDOGREL BISULFATE 75 MG/1
75 TABLET ORAL DAILY
Qty: 30 TABLET | Refills: 11 | Status: CANCELLED | OUTPATIENT
Start: 2025-07-23 | End: 2026-07-23

## 2025-07-23 RX ORDER — AMLODIPINE BESYLATE 5 MG/1
5 TABLET ORAL DAILY
Qty: 90 TABLET | Refills: 3 | Status: SHIPPED | OUTPATIENT
Start: 2025-07-23 | End: 2026-07-23

## 2025-07-23 NOTE — PATIENT INSTRUCTIONS
"[phone] [place] [facility] [place] ([facility]) to inquire about resources for "Seniors," such as rides to appointments, etc.      MUCINEX OR MUCINEX DM (STORE BRAND IS FINE), EITHER 1200MG ONCE A DAY OR 600MG TWICE A DAY FOR 5-7 DAYS    The Scoop on Statins: Things You Should Know.       Heart disease is the number one cause of death in the [place]. Around 1 in 20 adults aged 20 and older have coronary artery disease [1] and every 33 seconds a person dies from cardiovascular disease in the [place] [2]. The good news is that you can reduce your risk.    What are statins and how do they work?    Statins are a medication that is effective at lowering cholesterol and your risk of heart attack and stroke.     Statins lower low-density lipoprotein (LDL) cholesterol known as the bad cholesterol. Over time, just like the pipes in your house, your arteries or pipes can become filled with waxy cholesterol plaques that can build up and block blood flow to your heart. This can lead to heart attacks and strokes. Statins can draw the cholesterol out of plaques and slow down the production of cholesterol in the liver.       Figure 1: Reproduced from: What Is Atherosclerosis? National Heart, Lung, and Blood [place]. Available at http://www.nhlbi.nih.gov/health/health-topics/topics/atherosclerosis/.    What are the benefits and potential risks of statins?    Statins can decrease the risk of major heart events by 31% and have a 21% decrease in death from heart disease [4]. In addition to lowering bad cholesterol, status increase your HDL or good cholesterol. They also are known to have anti-inflammatory properties, decrease the risk of blood clots, and improve the lining of blood vessels [5].     Statins are safe and effective for most people, but there are some rare side effects that impact a small number of patients. Most patients do not experience any side effects and up to 90% of patients do not " experience muscle aches. Statins may also mildly increase glucose levels [6]. If a patient experiences side effects your doctor might try a different statin, lower dose, or decrease the frequency.     There are some common misconceptions about the potential side effects and risks of taking statins which have been debunked in scientific studies. Common myths include statins having a negative effect on liver health and cognitive health. Scientific studies monitoring thousands of patients have shown that statins do not cause dementia. Use of statins can improve your long-term cognitive and overall health by reducing your risk of stroke and heart disease.     Who are prescribed statins?    Statins are used to treat patients with high cholesterol and many doctors prescribe statins for people at risk of heart disease and stroke. Statins are also recommended for patients with diabetes, those with elevated coronary calcium scores and patients with a history of stroke, heart attack, and other cardiovascular events.     Talk to your doctor about your risk and whether statins might be right for you.    References    1.   Diann CW, Kash AW, Rocael ZI, Eliseo CAM, Israel P, Srinivas CL, Gabbie CM, Tyshawn AZ, Judi AK, Audrey AE, [place]-Felix Y, Elconor MSV, Celena KR, Ayan-Nliam C, Juli S, Torieo G, Ander DG, Hiremath S, River JE, Mariama R, Osmani DS, Mckinley D, Annei DA, Harjit J, Ma J, Magnani JW, Julius ED, Jaja ME, Cash SD, Marky NI, Eri R, Love M, Ángel GA, Kieran NS, [facility] MP, Harleen EL, John SS, Theresa MCBRIDE, Topher NY, Boston ND, [place] SS, Cristel K, Crescencio SS; American Heart Association [place] on Epidemiology and Prevention Statistics Committee and Stroke Statistics Subcommittee. Heart Disease and Stroke Statistics-2023 Update: A Report From the American Heart Association. Circulation. 2023 Feb 21;147(8):o81-a355. doi: 10.1161/CIR.7564474812309649. Epub 2023 Jan 25. Erratum in: Circulation. 2023 Feb  21;147(8):e622. Erratum in: Circulation. 2023 Jul 25;148(4):e4. PMID: 79415857.  2. National Center for Health Statistics. Multiple Cause of Death [phone] on [facility] WONDER Database. Accessed October 28, 2023.  3. Riana Marsh Macreadie I. Exploitation of Aspergillus terreus for the Production of Natural Statins. J Fungi ([facility]). 2016 Apr 30;2(2):13. doi: 10.[facility]0/sku2939050. PMID: 60813553; PMCID: FEK0521449.  4. Lino PIPPA, Hiram J, Diana S. Effect of statins on risk of coronary disease: a meta-analysis of randomized controlled trials. MARIA ESTHER. 1999 Dec 22-29;282(24):2340-6. doi: 10.1001/maria esther.282.24.2340. PMID: 74709663.  5. Keya P[place]-Jamison I, Casal-Guadarrama M, [place] AL. Statins: pros and cons. Med Clin ([place]). 2018 May 23;150(10):398-402. doi: 10.1016/j.medcli.2017.11.030. Epub 2017 Dec 29. PMID: 06672958; PMCID: [id number].  6. Ata S, Giovanny A, Giovanny S. Statin Therapy: Review of Safety and Potential Side Effects. Acta Cardiol Sin. 2016 Nov;32(6):631-639. doi: 10.6515/beh49640272g. PMID: 12194602; PMCID: FNV2996443.

## 2025-07-24 ENCOUNTER — TELEPHONE (OUTPATIENT)
Dept: NEUROLOGY | Facility: CLINIC | Age: 70
End: 2025-07-24
Payer: MEDICARE

## 2025-07-24 ENCOUNTER — PATIENT MESSAGE (OUTPATIENT)
Dept: ADMINISTRATIVE | Facility: CLINIC | Age: 70
End: 2025-07-24
Payer: MEDICARE

## 2025-07-24 ENCOUNTER — CLINICAL SUPPORT (OUTPATIENT)
Dept: REHABILITATION | Facility: HOSPITAL | Age: 70
End: 2025-07-24
Payer: MEDICARE

## 2025-07-24 ENCOUNTER — TELEPHONE (OUTPATIENT)
Dept: FAMILY MEDICINE | Facility: CLINIC | Age: 70
End: 2025-07-24
Payer: MEDICARE

## 2025-07-24 ENCOUNTER — PATIENT OUTREACH (OUTPATIENT)
Dept: ADMINISTRATIVE | Facility: CLINIC | Age: 70
End: 2025-07-24
Payer: MEDICARE

## 2025-07-24 DIAGNOSIS — M21.372 LEFT FOOT DROP: ICD-10-CM

## 2025-07-24 DIAGNOSIS — R20.8 IMPAIRED SENSATION TO LIGHT TOUCH: ICD-10-CM

## 2025-07-24 DIAGNOSIS — I63.9 CEREBROVASCULAR ACCIDENT (CVA), UNSPECIFIED MECHANISM: Primary | ICD-10-CM

## 2025-07-24 DIAGNOSIS — Z74.09 IMPAIRED FUNCTIONAL MOBILITY, BALANCE, GAIT, AND ENDURANCE: ICD-10-CM

## 2025-07-24 DIAGNOSIS — R53.1 LEFT-SIDED WEAKNESS: Primary | ICD-10-CM

## 2025-07-24 DIAGNOSIS — R53.1 LEFT-SIDED WEAKNESS: ICD-10-CM

## 2025-07-24 PROCEDURE — 97162 PT EVAL MOD COMPLEX 30 MIN: CPT | Mod: PO

## 2025-07-24 PROCEDURE — 97161 PT EVAL LOW COMPLEX 20 MIN: CPT | Mod: PO

## 2025-07-24 NOTE — PROGRESS NOTES
C3 nurse attempted to contact Della Juarez for a TCC post hospital discharge follow up call. No answer. Left voicemail with callback information. The patient has completed a scheduled HOSFU appointment with Yvette Castro on 07/23/2025 @ 1600.

## 2025-07-24 NOTE — TELEPHONE ENCOUNTER
Yvette Castro APRN-CNP Johnson, Maya, LPN  I told her there are neurologists in [place]. I do not know if any of them specialize in vascular neurology.          Previous Messages       ----- Message -----  From: Lily Zelaya LPN  Sent: 7/24/2025   2:27 PM CDT  To: PASCUAL Bolivar  Subject: FW: Physician Referral                            ----- Message -----  From: Mary Pack  Sent: 7/24/2025   9:58 AM CDT  To: Gloria Crawford Staff  Subject: Physician Referral                              Pt was referred to Dr Cabrales in neurology but she only shows a schedule in [place]. Pt states Yvette Gloria told of [facility] in [place] and that I need to get that information from her b/c it makes her very upset that she would need to call her ins company and find a provider close to her address that accepts her insurance. Can you please refer pt to vascular neurologist at an [facility] the [facility] that was spoken about during office visit.    Mary Mark

## 2025-07-24 NOTE — PROGRESS NOTES
Outpatient Rehab    Occupational Therapy Evaluation    Patient Name: Della Juarez  MRN: [id number]  YOB: 1955  Encounter Date: 7/24/2025    Therapy Diagnosis:   Encounter Diagnosis   Name Primary?    Left-sided weakness Yes     Physician: Marie Epps NP    Physician Orders: Eval and Treat  Medical Diagnosis: CVA (cerebral vascular accident)  Surgical Diagnosis: Not applicable for this Episode   Surgical Date: Not applicable for this Episode  Days Since Last Surgery: Not applicable for this Episode    Visit # / Visits Authorized: 1 / 1  Insurance Authorization Period: 7/21/2025 to 12/31/2025  Date of Evaluation: 7/24/2025  Plan of Care Certification: 7/24/2025 to 9/18/2025     Time In: 1600   Time Out: 1645  Total Time (in minutes): 45   Total Billable Time (in minutes): 45    Intake Outcome Measure for FOTO Survey    Therapist reviewed FOTO scores for Della Juarez on 7/24/2025.   FOTO report - see Media section or FOTO account episode details.     Intake Score (%): Not applicable for this Episode    Precautions:  Additional Precaution and Protocol Details: Standard, Fall, CVA    Subjective   History of Present Illness  Della is a 69 y.o. female who reports to occupational therapy with a chief concern of Not having full function of L-side.     The patient reports a medical diagnosis of Diagnosis  I63.9 (ICD-10-CM) - CVA (cerebral vascular accident).            Diagnostic tests related to this condition: MRI studies and CT scan.          Dominant Hand: Right  History of Present Condition/Illness: Della Juarez is a 69 year old female  who presented to [facility] after several days of left lower extremity numbness and weakness, tingling in left hand and elevated blood pressure. In ED -200 systolic. Patient was given 325 aspirin in ED. Patient was started on ASA, Plavix, statin therapy, and Lovenox. On day one of admission patient said that her symptoms were not resolved but slightly improved.  Neurology is following. Patient was seen by Neurology, recommendations were made for patient to continue dual antiplatelet therapy for 21 days, then transition to monotherapy with plavix. Patient was also instructed to continue to take atorvastatin 40 mg daily. Patient will be discharged with outpatient rehab ordered. Patient agreed she is not going to continue smoking. Pt to follow up with neuro in 4 weeks. She reports that she started feeling off Wednesday with elevated BP and then went to the hospital Saturday. She reports since her stroke she is modified independent but everything is harder to do and takes her longer. She was given a rolling walker at the hospital but asked about using a rollator.     Activities of Daily Living  Social history was obtained from Patient.    General Prior Level of Function Comments: I  General Current Level of Function Comments: Mod I; has learned strategies for donning without A  Patient Responsibilities: Personal ADL, Meal prep, Health management, Home management, Driving, Community mobility, Financial management    Previously independent with activities of daily living? Yes     Currently independent with activities of daily living? No  Activities currently needing assistance include Bathing, Dressing - lower body, Functional mobility, Dressing - upper body, and Grooming.        Previously independent with instrumental activities of daily living? Yes     Currently independent with instrumental activities of daily living? No  Activities currently needing assistance include: Grocery/shopping, Driving, Community mobility, Home establishment and management, Meal prep, and Health management.            Pain     Patient reports a current pain level of 0/10. Pain at best is reported as 0/10. Pain at worst is reported as 0/10.   Clinical Progression (since onset): Stable         Review of Systems  Patient reports: Sleep Disturbance, Decreased Range of Motion, Fatigue, Numbness,  Tingling, and Weakness        Treatment History  Treatments  Discharged From Past 30 Days: Inpatient acute facility  Previously Received Treatments: Yes  Previous Treatments: Occupational therapy, Physical therapy  Currently Receiving Treatments: No    Living Arrangements  Living Situation  Housing: Home independently  Living Arrangements: Alone  Support Systems: Family members, Friends/neighbors    Home Setup  Type of Structure: House  Home Access: Other (Comment)  Other Home Access Comment: 2 in threshold to enter home  Number of Levels in Home: One level  Patient is able to live on main floor of home: Yes  Primary Bedroom: 1st floor  Primary Bathroom: 1st floor  Location of Additional Bathrooms: 1st floor  Bathroom Toilet: Standard  Bathroom Shower/Tub: Tub/shower unit  Bathroom Equipment: Handheld shower hose, Shower chair with back  Additional Bathroom Accessibility Comments: has shower chair but has not had a chance to use it since she has just returned hm 4 days prior to eval  Kitchen: 1st floor  Laundry: 1st floor    Equipment/Treatments  Mobility Equipment: Wheeled walker  Patient Expressive Communication Modes: Verbal    Patient has a 2 in threshold to enter home * see information input on 2025 for 2025      Employment  Does the patient's condition impact their ability to work?: Yes  Employment Status: Employed full-time  Not working at this time but would like to return to work, 2 part time jobs.      Past Medical History/Physical Systems Review:   Della Juarez  has a past medical history of Diabetes mellitus, Hypertension, and Mental disorder.    Della Juarez  has a past surgical history that includes  section and Cosmetic surgery.    Della has a current medication list which includes the following prescription(s): albuterol, alprazolam, amlodipine, aspirin, atorvastatin, blood sugar diagnostic, blood-glucose meter, clopidogrel, clotrimazole-betamethasone 1-0.05%, glimepiride, lancets,  losartan, metformin, multivitamin, nicotine, and vitamin d.    Review of patient's allergies indicates:   Allergen Reactions    Penicillins      Other reaction(s): Vomiting  Other reaction(s): Swelling  Other reaction(s): Nausea  Other reaction(s): Hives        Objective   Communication  The patient's current expressive communication modes are Verbal.      Posture        Shoulders are Rounded.         Right  Strength  Right Hand Dynamometer Position: 2  Elbow Position Forearm Position Trial 1 (lbs) Trial 2  (lbs) Trial 3  (lbs) Average  (lbs) Pain   Flexed Neutral 59 45 61 55         Left  Strength  Left Hand Dynamometer Position: 2  Elbow Position Forearm Position Trial 1 (lbs) Trial 2 (lbs) Trial 3 (lbs) Average (lbs) Pain   Flexed Neutral 25 23 30 26       Left Pinch Strength   Trial 1 (lbs)   Lateral (Key Pinch) 11   Three Point (Three Jaw Lion)  Unable to perform   Two Point (Tip to Tip)  Unable to perform     Wrist/Hand Special Tests  Hand Coordination Special Tests  Right 9-Hole Peg Test (sec): 22  Left 9-Hole Peg Test (sec): 1.25  Left In Hand Manipulation: Impaired     Coordination  Coordination Tests  Impaired: Left Rapid Alternating Movements (Pronation/Supination), Left Rapid Alternating Movements (Digit Opposition), and Left In Hand Manipulation  Right 9-Hole Peg Test (sec): 22  Left 9-Hole Peg Test (sec): 1.25     Time Entry(in minutes):  OT Evaluation (Moderate) Time Entry: 45    Assessment & Plan   Assessment  Della presents with a condition of Moderate complexity.   Presentation of Symptoms: Stable  Will Comorbidities Impact Care: No       ADL Limitations : Bathing/showering, Dressing, Functional mobility, Personal hygiene and grooming  IADL Limitations: Community mobility, Driving, Grocery/shopping, Home establishment and management, Meal preparation and cleanup  Rest and Sleep Limitations: Disrupted sleep pattern  Work Limitations: Employment interests and pursuits, Job performance,  Transportation barrier  Leisure Limitations: Leisure exploration, Leisure participation  Functional Limitations: Activity tolerance, Ambulating on uneven surfaces, Carrying objects, Community integration, Completing self-care activities, Completing work/school activities, Decreased ambulation distance/endurance, Driving, Fine motor coordination, Functional mobility, Gait limitations, Getting off the floor, Increased risk of fall, Maintaining balance, Squatting, Standing tolerance, Transfers, Reaching, Range of motion, Manipulating objects, Gross motor coordination         Personal Factors Affecting Prognosis: Emotional, Fear/anxiety, Physical limitations    Occupational profile: The patient presented to occupational therapy accompanied by her daughter and stepfather, reporting LUE weakness following a CVA. She serves as her own historian and states that she was independent at her PLOF Living alone, she is responsible for managing all personal ADLs and IADLs. She describes experiencing tingling and reduced sensation throughout her LUE. Prior to her CVA, she worked part-time for two different employers, which constituted full-time employment overall. Functionally, she expresses difficulty using a curling iron and requires assistance to put her hair in a ponytail. She reports challenges with lower body dressing, fastening buttons and zippers, transferring in and out of the bathtub, and preparing meals. To adapt, she now dons her shoes while seated on a standard chair, noting that this position offers greater ease and stability. .   Evaluation/Treatment Response: Patient responded to treatment well  Response Details: Patient very tearful  Patient Goal for Therapy (OT): To return to her PLOF  Prognosis: Good  Assessment Details: Della presents to outpatient therapy with her stepdad and daughter. She is motivated to work hard in therapy to lizbet her (I) during her self care tasks and daily routine. Her impairments include  the following: decreased LUE strength and motor control as well as with her hands, decreased sensation to light touch on left, decreased endurance, impaired functional performance, AROM, and LUE strength. Will assess functional ADL performance at next session. She will benefit from occupational therapy 2x a week for 8 weeks to improve impairments and return to previous level of function.     Plan  From an occupational therapy perspective, the patient would benefit from: Skilled Rehab Services    Planned therapy interventions include: Therapeutic exercise, Therapeutic activities, Neuromuscular re-education, ADLs/IADLs, and Work conditioning.    Planned modalities to include: Electrical stimulation - attended, Cryotherapy (cold pack), and Thermotherapy (hot pack).        Visit Frequency: 2 times Per Week for 8 Weeks.       This plan was discussed with Patient and Family.   Discussion participants: Agreed Upon Plan of Care  Plan details: POC 7/24/52025-9/18/2025          The patient's spiritual, cultural, and educational needs were considered, and the patient is agreeable to the plan of care and goals.     Education  Education was done with Patient and Other recipient present. The patient's learning style includes Listening. The patient Verbalizes understanding and Requires continuing/additional education. dtr and step dad participated in education. They identified as Parent and Child. The reported learning style is Listening. The recipient Verbalizes understanding.     Patient educated on impairements, plan of care, and therapy goals.        Goals:   Active       LTG       Regain independence in lower-body dressing, including managing buttons and zippers, with minimal to no assistance.       Start:  07/24/25    Expected End:  09/18/25            Safely transfer in and out of the bathtub using adaptive strategies or devices, without requiring physical support from others.       Start:  07/24/25    Expected End:   09/18/25            Perform meal preparation tasks independently, including light cooking and utensil use, with improved endurance and LUE functional strength.       Start:  07/24/25    Expected End:  09/18/25               STG       Improve LUE strength and motor control to assist with basic grooming tasks such as using a curling iron or brushing hair with minimal assistance.       Start:  07/24/25    Expected End:  08/25/25            Increase light touch sensation awareness in LUE through sensory reeducation activities, improving ability to feel manage items in left-hand without visual assistance.       Start:  07/24/25    Expected End:  08/25/25            Improve transfer technique and safety by practicing getting in and out of a standard bathtub using adaptive equipment or strategies, with supervision as needed.       Start:  07/24/25    Expected End:  08/25/25                Samantha Leal OT

## 2025-07-24 NOTE — TELEPHONE ENCOUNTER
Pt notified and voiced understanding. States she is currently at PT and will call back tomorrow if she has further questions.

## 2025-07-25 ENCOUNTER — OUTPATIENT CASE MANAGEMENT (OUTPATIENT)
Dept: ADMINISTRATIVE | Facility: OTHER | Age: 70
End: 2025-07-25
Payer: MEDICARE

## 2025-07-25 NOTE — TELEPHONE ENCOUNTER
Spoke with pt. States Yvette reported neurology provider possible goes to [place] one day a week. States she is currently scheduled in . Advised pt she would need to call scheduling to see if provider does make rounds in [place]. Pt voiced understanding. Will keep appt for now and see if she can find anyone sooner

## 2025-07-25 NOTE — TELEPHONE ENCOUNTER
Flora Krishna Staff  Caller: Unspecified (Today, 11:17 AM)  Patient calling in regarding to returning a phone call  [phone]

## 2025-07-27 PROBLEM — Z87.891 PERSONAL HISTORY OF NICOTINE DEPENDENCE: Status: ACTIVE | Noted: 2025-07-27

## 2025-07-27 PROBLEM — R06.2 WHEEZING: Status: ACTIVE | Noted: 2025-07-27

## 2025-07-27 NOTE — PROGRESS NOTES
SUBJECTIVE:    Patient ID: Della Juarez is a 69 y.o. female.    Chief Complaint: Follow-up (Bottles brought//Pt is here for HFU, for CVA. Pt stated that she is feeling over whelm. Increase anxiety and stress, b/c of the weakness on the left side of her body//Pt is scheduled for PT, this Tuesday. Pt is elevated for OT tomorrow//Medication refills//Diarrhea x the last 2 days//WAN )    Patient Name: Della Juarez  MRN: [id number]  [facility]: [id number]  Patient Class: IP- Inpatient  Admission Date: 2025  Hospital Length of Stay: 2 days  Discharge Date and Time: 2025 1:05 PM  Attending Physician: Sanaz Langston MD   Discharging Provider: NANCY TYLER NP  Primary Care Provider: Fatuma Hollins MD     Primary Care Team: Networked reference to record PCT      HPI:   69 year old female with comorbid conditions of hypertension, diabetes, current smoker presents due to several days of left lower extremity numbness and weakness, left hand tingling and elevated blood pressure.  Symptoms started on Wednesday and have persisted.  Patient does not check BP regularly but on Wednesday she checked because she was not thinking clearly (was elevated to 170-210s).   Reports being under a lot of stress recently.  Denies headache, chills, fevers, recent travel, sick contact, chest pain, SOB, nausea/vomiting, diarrhea.  Denies facial numbness/drooping, slurred speech.  Reports compliance with antihypertensives.    Reports mother had 3 strokes and she  from the last one.    In ED blood pressure is in 180-200s systolic.  CT head revealed hypodensity in right basal gangla concerning for acute vs subacute stroke. Patient was given 325mg aspirin in ED.     * No surgery found *       Hospital Course:   Patient was evaluated and closely monitored for stroke like symptoms. Patient's CT head was negative but follow up MRI showed acute ischemic stroke. Patient was started on ASA, Plavix, statin therapy, and Lovenox. On day one of  "admission patient said that her symptoms were not resolved but slightly improved. Neurology is following. Patient was seen by Neurology, recommendations were made for patient to continue dual antiplatelet therapy for 21 days, then transition to monotherapy with plavix. Patient was also instructed to continue to take atorvastatin 40 mg daily. Patient will be discharged with outpatient rehab ordered. Patient agreed she is not going to continue smoking. Patient was assessed on day of discharge, all questions were answered to patient and son's satisfaction. Patient is medically cleared for discharge with outpatient therapy and neuro follow up in 4 weeks.           Follow-up      History of Present Illness    CHIEF COMPLAINT:  Della presents today for hospital follow-up after recent stroke.    HISTORY OF PRESENT ILLNESS:  She experienced an ischemic stroke between the brainstem and basal ganglia. She developed progressive left-sided weakness over several days, with blood pressure readings as high as 200/115 in the days preceding the stroke. She reports having to "compensate significantly" with her right side, leading to fatigue. Her cognitive function remains intact. She initially noticed neurological symptoms on Saturday before seeking emergency medical attention due to increasing weakness.    CURRENT SYMPTOMS:  She requires a walker for mobility and assistance with transportation to medical appointments. She reports increased weakness in the affected leg compared to a few days prior. She has dependent edema in the affected foot that worsens throughout the day. She also reports a persistent cough for 1-2 months following workplace exposure to a respiratory illness, which has not resolved unlike her coworkers.    MEDICATIONS:  She recently started Lipitor but experiences severe diarrhea with urgency. She was started on Plavix and Ecotrin to be taken together in the morning for three weeks, after which Ecotrin will be " discontinued. She continues Amlodipine 5 mg. For diabetes management, she takes Metformin daily and uses Glyburide as needed for significant glucose elevations.    MEDICAL HISTORY:  She has diabetes with recent A1C level of 7.2 five months ago, 7.4 two months ago, and 7.1 during recent hospitalization. She recently quit smoking upon hospitalization.    FAMILY HISTORY:  Her mother experienced three strokes: first with near-complete recovery after therapy, second resulting in right-sided paralysis and speech impairment, and third leading to death. Her father experienced nine heart attacks throughout his life.      ROS:  General: -fever, -chills, -fatigue, -weight gain, -weight loss  Eyes: -vision changes, -redness, -discharge  ENT: -ear pain, -nasal congestion, -sore throat  Cardiovascular: -chest pain, -palpitations, +lower extremity edema  Respiratory: +cough, -shortness of breath, +productive cough  Gastrointestinal: -abdominal pain, -nausea, -vomiting, +diarrhea, -constipation, -blood in stool, +bowel incontinence  Genitourinary: -dysuria, -hematuria, -frequency  Musculoskeletal: -joint pain, -muscle pain, +limb swelling, +muscle cramps  Skin: -rash, -lesion  Neurological: -headache, -dizziness, -numbness, -tingling, +weakness, +muscle weakness  Psychiatric: -anxiety, -depression, -sleep difficulty         Admission on 07/19/2025, Discharged on 07/21/2025   Component Date Value Ref Range Status    Sodium 07/19/2025 141  136 - 145 mmol/L Final    Potassium 07/19/2025 4.2  3.5 - 5.1 mmol/L Final    Chloride 07/19/2025 108  95 - 110 mmol/L Final    CO2 07/19/2025 23  23 - 29 mmol/L Final    Glucose 07/19/2025 123 (H)  70 - 110 mg/dL Final    BUN 07/19/2025 21  8 - 23 mg/dL Final    Creatinine 07/19/2025 0.9  0.5 - 1.4 mg/dL Final    Calcium 07/19/2025 9.6  8.7 - 10.5 mg/dL Final    Protein Total 07/19/2025 6.8  6.0 - 8.4 gm/dL Final    Albumin 07/19/2025 4.2  3.5 - 5.2 g/dL Final    Bilirubin Total 07/19/2025 0.5   0.1 - 1.0 mg/dL Final    ALP 07/19/2025 67  55 - 135 unit/L Final    AST 07/19/2025 27  10 - 40 unit/L Final    ALT 07/19/2025 36  10 - 44 unit/L Final    Anion Gap 07/19/2025 10  8 - 16 mmol/L Final    eGFR 07/19/2025 >60  >60 mL/min/1.73/m2 Final    PT 07/19/2025 11.7  9.0 - 12.5 seconds Final    INR 07/19/2025 1.1  0.8 - 1.2 Final    TSH 07/19/2025 3.228  0.340 - 5.600 uIU/mL Final    QRS Duration 07/19/2025 78  ms Final    [facility] QTC Calculation 07/19/2025 447  ms Final    Cholesterol Total 07/19/2025 226 (H)  120 - 199 mg/dL Final    Triglyceride 07/19/2025 97  30 - 150 mg/dL Final    HDL Cholesterol 07/19/2025 61  40 - 75 mg/dL Final    LDL Cholesterol 07/19/2025 145.6  63.0 - 159.0 mg/dL Final    HDL/Cholesterol Ratio 07/19/2025 27.0  20.0 - 50.0 % Final    Cholesterol/HDL Ratio 07/19/2025 3.7  2.0 - 5.0 Final    Non HDL Cholesterol 07/19/2025 165  mg/dL Final    WBC 07/19/2025 12.88 (H)  3.90 - 12.70 K/uL Final    RBC 07/19/2025 4.91  4.00 - 5.40 M/uL Final    Hgb 07/19/2025 15.3  12.0 - 16.0 gm/dL Final    Hct 07/19/2025 45.0  37.0 - 48.5 % Final    MCV 07/19/2025 92  82 - 98 fL Final    MCH 07/19/2025 31.2 (H)  27.0 - 31.0 pg Final    MCHC 07/19/2025 34.0  32.0 - 36.0 g/dL Final    RDW 07/19/2025 13.4  11.5 - 14.5 % Final    Platelet Count 07/19/2025 259  150 - 450 K/uL Final    MPV 07/19/2025 10.5  9.2 - 12.9 fL Final    Nucleated RBC 07/19/2025 0  <=0 /100 WBC Final    Neut % 07/19/2025 75.7 (H)  38 - 73 % Final    Lymph % 07/19/2025 15.1 (L)  18 - 48 % Final    Mono % 07/19/2025 7.9  4 - 15 % Final    Eos % 07/19/2025 0.5  0 - 8 % Final    Basophil % 07/19/2025 0.4  <=1.9 % Final    Imm Grans % 07/19/2025 0.4  0.0 - 0.5 % Final    Neut # 07/19/2025 9.8 (H)  1.8 - 7.7 K/uL Final    Lymph # 07/19/2025 1.95  1 - 4.8 K/uL Final    Mono # 07/19/2025 1.02 (H)  0.3 - 1 K/uL Final    Eos # 07/19/2025 0.06  <=0.5 K/uL Final    Baso # 07/19/2025 0.05  <=0.2 K/uL Final    Imm Grans # 07/19/2025 0.05 (H)  0.00 - 0.04  K/uL Final    IVC diameter 07/20/2025 1.22  cm Final    TDI SEPTAL 07/20/2025 0.04  m/s Final    MV valve area by continuity eq 07/20/2025 2.00  cm2 Final    Ao root annulus 07/20/2025 3.4  cm Final    Pulmonary Valve Mean Velocity 07/20/2025 0.63  m/s Final    PV peak gradient 07/20/2025 4  mmHg Final    PV PEAK VELOCITY 07/20/2025 0.95  m/s Final    MV VTI 07/20/2025 29.4  cm Final    MV peak gradient 07/20/2025 7  mmHg Final    MV mean gradient 07/20/2025 3  mmHg Final    TRIPP by Velocity Ratio 07/20/2025 1.8  cm² Final    AV index (prosthetic) 07/20/2025 0.61   Final    AV Velocity Ratio 07/20/2025 0.56   Final    AV valve area 07/20/2025 1.9  cm² Final    LVOT peak VTI 07/20/2025 18.7  cm Final    Ao VTI 07/20/2025 30.6  cm Final    Ao peak vitaliy 07/20/2025 1.6  m/s Final    AV peak gradient 07/20/2025 10  mmHg Final    AV mean gradient 07/20/2025 5  mmHg Final    RA vol index 07/20/2025 18.31  mL/m2 Final    RA area length vol 07/20/2025 37.90  mL Final    CECILIA (MOD) 07/20/2025 43  mL/m2 Final    LA Vol (MOD) 07/20/2025 89  mL Final    TAPSE 07/20/2025 1.7  cm Final    MV Peak A Vitaliy 07/20/2025 1.18  m/s Final    RV S' 07/20/2025 15.00  cm/s Final    E/E' ratio 07/20/2025 18  m/s Final    LV mass 07/20/2025 164.5  g Final    Left Ventricular Outflow Tract Hilda* 07/20/2025 2.00  mmHg Final    Left Ventricular Outflow Tract Hilda* 07/20/2025 0.63  cm/s Final    LVOT peak vitaliy 07/20/2025 0.9  m/s Final    LV LATERAL E/E' RATIO 07/20/2025 15.3  m/s Final    LV SEPTAL E/E' RATIO 07/20/2025 23.0  m/s Final    E wave deceleration time 07/20/2025 320  msec Final    E/A ratio 07/20/2025 0.78   Final    TDI LATERAL 07/20/2025 0.06  m/s Final    MV Peak E Vitaliy 07/20/2025 0.92  m/s Final    LV Mass Index 07/20/2025 79.4  g/m2 Final    PW 07/20/2025 0.7  0.6 - 1.1 cm Final    Left Ventricle Relative Wall Thick* 07/20/2025 0.30  cm Final    FS 07/20/2025 29.8  28 - 44 % Final    LVOT area 07/20/2025 3.1  cm2 Final    LVOT diameter  07/20/2025 2.0  cm Final    IVS 07/20/2025 1.3 (A)  0.6 - 1.1 cm Final    Left Ventricular End Diastolic Vol* 07/20/2025 99.83  mL Final    Left Ventricular End Systolic Volu* 07/20/2025 42.53  mL Final    LV EDV A4C 07/20/2025 99.30  mL Final    LV EDV A2C 07/20/2025 53.222966284159760  mL Final    LV Diastolic Volume Index 07/20/2025 48.31  mL/m2 Final    LV ESV A4C 07/20/2025 60.20  mL Final    LV Diastolic Volume 07/20/2025 100  mL Final    LV ESV A2C 07/20/2025 113.00  mL Final    LVIDs 07/20/2025 3.3  2.1 - 4.0 cm Final    LV Systolic Volume Index 07/20/2025 20.8  mL/m2 Final    LV Systolic Volume 07/20/2025 43  mL Final    LVIDd 07/20/2025 4.7  3.5 - 6.0 cm Final    LVOT stroke volume 07/20/2025 58.7  cm3 Final    A4C EF 07/20/2025 46  % Final    A2C EF 07/20/2025 55  % Final    Herrera's Biplane MOD Ejection Fra* 07/20/2025 48  % Final    Mean e' 07/20/2025 0.05  m/s Final    ZLVIDS 07/20/2025 -1.22   Final    ZLVIDD 07/20/2025 -2.92   Final    LA area A4C 07/20/2025 20.70  cm2 Final    LA area A2C 07/20/2025 30.80  cm2 Final    AORTIC VALVE CUSP SEPERATION 07/20/2025 1.70  cm Final    BSA 07/20/2025 2.16  m2 Final    OHS CV CPX PATIENT HEIGHT IN 07/20/2025 64.5   Final    POCT Glucose 07/19/2025 205 (H)  70 - 110 mg/dL Final    Sodium 07/20/2025 139  136 - 145 mmol/L Final    Potassium 07/20/2025 3.8  3.5 - 5.1 mmol/L Final    Chloride 07/20/2025 108  95 - 110 mmol/L Final    CO2 07/20/2025 23  23 - 29 mmol/L Final    Glucose 07/20/2025 148 (H)  70 - 110 mg/dL Final    BUN 07/20/2025 19  8 - 23 mg/dL Final    Creatinine 07/20/2025 0.8  0.5 - 1.4 mg/dL Final    Calcium 07/20/2025 9.1  8.7 - 10.5 mg/dL Final    Anion Gap 07/20/2025 8  8 - 16 mmol/L Final    eGFR 07/20/2025 >60  >60 mL/min/1.73/m2 Final    Magnesium 07/20/2025 1.7  1.6 - 2.6 mg/dL Final    Phosphorus Level 07/20/2025 4.0  2.7 - 4.5 mg/dL Final    WBC 07/20/2025 9.67  3.90 - 12.70 K/uL Final    RBC 07/20/2025 4.80  4.00 - 5.40 M/uL Final    Hgb  07/20/2025 14.8  12.0 - 16.0 gm/dL Final    Hct 07/20/2025 44.7  37.0 - 48.5 % Final    MCV 07/20/2025 93  82 - 98 fL Final    MCH 07/20/2025 30.8  27.0 - 31.0 pg Final    MCHC 07/20/2025 33.1  32.0 - 36.0 g/dL Final    RDW 07/20/2025 13.5  11.5 - 14.5 % Final    Platelet Count 07/20/2025 241  150 - 450 K/uL Final    MPV 07/20/2025 10.9  9.2 - 12.9 fL Final    Nucleated RBC 07/20/2025 0  <=0 /100 WBC Final    Neut % 07/20/2025 64.7  38 - 73 % Final    Lymph % 07/20/2025 21.1  18 - 48 % Final    Mono % 07/20/2025 10.9  4 - 15 % Final    Eos % 07/20/2025 2.2  0 - 8 % Final    Basophil % 07/20/2025 0.6  <=1.9 % Final    Imm Grans % 07/20/2025 0.5  0.0 - 0.5 % Final    Neut # 07/20/2025 6.3  1.8 - 7.7 K/uL Final    Lymph # 07/20/2025 2.04  1 - 4.8 K/uL Final    Mono # 07/20/2025 1.05 (H)  0.3 - 1 K/uL Final    Eos # 07/20/2025 0.21  <=0.5 K/uL Final    Baso # 07/20/2025 0.06  <=0.2 K/uL Final    Imm Grans # 07/20/2025 0.05 (H)  0.00 - 0.04 K/uL Final    POCT Glucose 07/20/2025 143 (H)  70 - 110 mg/dL Final    POCT Glucose 07/20/2025 143 (H)  70 - 110 mg/dL Final    POCT Glucose 07/20/2025 156 (H)  70 - 110 mg/dL Final    POCT Glucose 07/20/2025 152 (H)  70 - 110 mg/dL Final    POCT Glucose 07/20/2025 124 (H)  70 - 110 mg/dL Final    Sodium 07/21/2025 139  136 - 145 mmol/L Final    Potassium 07/21/2025 4.1  3.5 - 5.1 mmol/L Final    Chloride 07/21/2025 106  95 - 110 mmol/L Final    CO2 07/21/2025 26  23 - 29 mmol/L Final    Glucose 07/21/2025 152 (H)  70 - 110 mg/dL Final    BUN 07/21/2025 21  8 - 23 mg/dL Final    Creatinine 07/21/2025 1.0  0.5 - 1.4 mg/dL Final    Calcium 07/21/2025 9.2  8.7 - 10.5 mg/dL Final    Anion Gap 07/21/2025 7 (L)  8 - 16 mmol/L Final    eGFR 07/21/2025 >60  >60 mL/min/1.73/m2 Final    Magnesium 07/21/2025 2.0  1.6 - 2.6 mg/dL Final    Phosphorus Level 07/21/2025 3.2  2.7 - 4.5 mg/dL Final    WBC 07/21/2025 9.70  3.90 - 12.70 K/uL Final    RBC 07/21/2025 4.98  4.00 - 5.40 M/uL Final    Hgb  07/21/2025 15.4  12.0 - 16.0 gm/dL Final    Hct 07/21/2025 46.7  37.0 - 48.5 % Final    MCV 07/21/2025 94  82 - 98 fL Final    MCH 07/21/2025 30.9  27.0 - 31.0 pg Final    MCHC 07/21/2025 33.0  32.0 - 36.0 g/dL Final    RDW 07/21/2025 13.5  11.5 - 14.5 % Final    Platelet Count 07/21/2025 246  150 - 450 K/uL Final    MPV 07/21/2025 10.8  9.2 - 12.9 fL Final    Nucleated RBC 07/21/2025 0  <=0 /100 WBC Final    Neut % 07/21/2025 69.0  38 - 73 % Final    Lymph % 07/21/2025 17.6 (L)  18 - 48 % Final    Mono % 07/21/2025 9.7  4 - 15 % Final    Eos % 07/21/2025 2.5  0 - 8 % Final    Basophil % 07/21/2025 0.7  <=1.9 % Final    Imm Grans % 07/21/2025 0.5  0.0 - 0.5 % Final    Neut # 07/21/2025 6.7  1.8 - 7.7 K/uL Final    Lymph # 07/21/2025 1.71  1 - 4.8 K/uL Final    Mono # 07/21/2025 0.94  0.3 - 1 K/uL Final    Eos # 07/21/2025 0.24  <=0.5 K/uL Final    Baso # 07/21/2025 0.07  <=0.2 K/uL Final    Imm Grans # 07/21/2025 0.05 (H)  0.00 - 0.04 K/uL Final    POCT Glucose 07/21/2025 160 (H)  70 - 110 mg/dL Final    Hemoglobin A1c 07/21/2025 7.1 (H)  4.5 - 6.2 % Final    Estimated Average Glucose 07/21/2025 157 (H)  68 - 131 mg/dL Final   Office Visit on 01/29/2025   Component Date Value Ref Range Status    Hemoglobin A1C, POC 01/29/2025 7.2  % Final    TSH 05/21/2025 3.190  0.450 - 4.500 uIU/mL Final    WBC 05/21/2025 11.6 (H)  3.4 - 10.8 x10E3/uL Final    RBC 05/21/2025 5.26  3.77 - 5.28 x10E6/uL Final    Hemoglobin 05/21/2025 16.1 (H)  11.1 - 15.9 g/dL Final    Hematocrit 05/21/2025 49.6 (H)  34.0 - 46.6 % Final    MCV 05/21/2025 94  79 - 97 fL Final    MCH 05/21/2025 30.6  26.6 - 33.0 pg Final    MCHC 05/21/2025 32.5  31.5 - 35.7 g/dL Final    RDW 05/21/2025 13.1  11.7 - 15.4 % Final    Platelets 05/21/2025 219  150 - 450 x10E3/uL Final    Neutrophils 05/21/2025 69  Not Estab. % Final    Lymphs 05/21/2025 17  Not Estab. % Final    Monocytes 05/21/2025 11  Not Estab. % Final    Eos 05/21/2025 3  Not Estab. % Final    Basos  05/21/2025 0  Not Estab. % Final    Neutrophils (Absolute) 05/21/2025 8.0 (H)  1.4 - 7.0 x10E3/uL Final    Lymphs (Absolute) 05/21/2025 1.9  0.7 - 3.1 x10E3/uL Final    Monocytes(Absolute) 05/21/2025 1.2 (H)  0.1 - 0.9 x10E3/uL Final    Eos (Absolute) 05/21/2025 0.4  0.0 - 0.4 x10E3/uL Final    Baso (Absolute) 05/21/2025 0.1  0.0 - 0.2 x10E3/uL Final    Immature Granulocytes 05/21/2025 0  Not Estab. % Final    Immature Grans (Abs) 05/21/2025 0.0  0.0 - 0.1 x10E3/uL Final    Cholesterol 05/21/2025 218 (H)  100 - 199 mg/dL Final    Triglycerides 05/21/2025 98  0 - 149 mg/dL Final    HDL 05/21/2025 78  >39 mg/dL Final    VLDL Cholesterol Storm 05/21/2025 17  5 - 40 mg/dL Final    LDL Calculated 05/21/2025 123 (H)  0 - 99 mg/dL Final    Glucose 05/21/2025 165 (H)  70 - 99 mg/dL Final    BUN 05/21/2025 15  8 - 27 mg/dL Final    Creatinine 05/21/2025 0.86  0.57 - 1.00 mg/dL Final    eGFR 05/21/2025 74  >59 mL/min/1.73 Final    BUN/Creatinine Ratio 05/21/2025 17  12 - 28 Final    Sodium 05/21/2025 137  134 - 144 mmol/L Final    Potassium 05/21/2025 4.5  3.5 - 5.2 mmol/L Final    Chloride 05/21/2025 101  96 - 106 mmol/L Final    CO2 05/21/2025 23  20 - 29 mmol/L Final    Calcium 05/21/2025 9.7  8.7 - 10.3 mg/dL Final    Protein, Total 05/21/2025 6.7  6.0 - 8.5 g/dL Final    Albumin 05/21/2025 4.7  3.9 - 4.9 g/dL Final    Globulin, Total 05/21/2025 2.0  1.5 - 4.5 g/dL Final    Total Bilirubin 05/21/2025 0.5  0.0 - 1.2 mg/dL Final    Alkaline Phosphatase 05/21/2025 101  44 - 121 IU/L Final    AST 05/21/2025 27  0 - 40 IU/L Final    ALT 05/21/2025 38 (H)  0 - 32 IU/L Final    Creatinine Random urine 05/21/2025 126.5  Not Estab. mg/dL Final    Microalb, Ur 05/21/2025 21.6  Not Estab. ug/mL Final    Microalb/Crt. Ratio 05/21/2025 17  0 - 29 mg/g creat Final    Hemoglobin A1c 05/21/2025 7.4 (H)  4.8 - 5.6 % Final    Specific Gravity, UA 05/21/2025 1.020  1.005 - 1.030 Final    pH, UA 05/21/2025 6.0  5.0 - 7.5 Final    Color, UA  2025 Yellow  Yellow Final    Clarity, UA 2025 Clear  Clear Final    Leukocytes, UA 2025 Negative  Negative Final    Protein, UA 2025 Trace  Negative/Trace Final    Glucose, UA 2025 Negative  Negative Final    Ketones, UA 2025 Negative  Negative Final    Occult Blood UA 2025 Negative  Negative Final    Bilirubin, UA 2025 Negative  Negative Final    Urobilinogen, UA 2025 0.2  0.2 - 1.0 mg/dL Final    Nitrite, UA 2025 Negative  Negative Final    Microscopic Examination 2025 Comment   Final    Microscopic Examination 2025 See below:   Final    WBC, UA 2025 0-5  0 - 5 /hpf Final    RBC, UA 2025 None seen  0 - 2 /hpf Final    Epithelial Cells (non renal) 2025 >10 (A)  0 - 10 /hpf Final    Casts 2025 None seen  None seen /lpf Final    Bacteria, UA 2025 Few  None seen/Few Final       Past Medical History:   Diagnosis Date    Diabetes mellitus     Hypertension     Mental disorder     Anxiety     Social History[1]  Past Surgical History:   Procedure Laterality Date     SECTION      x3    COSMETIC SURGERY      Scar on right side of face     Family History   Problem Relation Name Age of Onset    Hemochromatosis Mother      Hypertension Mother      Stroke Mother      Diabetes Father      Hypertension Father      Breast cancer Paternal Aunt      Diabetes Maternal Grandmother      Hypertension Maternal Grandmother      Stroke Maternal Grandmother      Cancer Neg Hx      Colon cancer Neg Hx      Ovarian cancer Neg Hx      Eclampsia Neg Hx      Miscarriages / Stillbirths Neg Hx       labor Neg Hx         The CVD Risk score (D'Agostino, et al., 2008) failed to calculate for the following reasons:    The patient has a prior MI, stroke, CHF, or peripheral vascular disease diagnosis    Tests to Keep You Healthy    Mammogram: Met on 2024  Eye Exam: DUE  Colon Cancer Screening: DUE  Last Blood Pressure <= 139/89  "(7/23/2025): NO  Last HbA1c < 8 (07/21/2025): Yes      Review of patient's allergies indicates:   Allergen Reactions    Penicillins      Other reaction(s): Vomiting  Other reaction(s): Swelling  Other reaction(s): Nausea  Other reaction(s): Hives     Current Medications[2]    Review of Systems        Objective:      Vitals:    07/23/25 1556 07/23/25 1601   BP: (!) 177/86 (!) 176/88   Pulse: 94    SpO2: 99%    Height: 5' 4.5" (1.638 m)      Physical Exam  Constitutional:       General: She is not in acute distress.     Appearance: Normal appearance. She is obese. She is not ill-appearing.   HENT:      Head: Normocephalic and atraumatic.      Nose: Nose normal.   Eyes:      General: No scleral icterus.  Neck:      Vascular: No carotid bruit.   Cardiovascular:      Rate and Rhythm: Normal rate and regular rhythm.   Pulmonary:      Effort: Pulmonary effort is normal.      Breath sounds: Normal breath sounds.   Musculoskeletal:      Right lower leg: No edema.      Left lower leg: No edema.      Comments: Trace pedal edema to left foot   Skin:     General: Skin is warm and dry.      Capillary Refill: Capillary refill takes less than 2 seconds.      Findings: No rash.   Neurological:      Mental Status: She is alert and oriented to person, place, and time.      Motor: Weakness present.   Psychiatric:      Comments: Mood in anxious                    Assessment:       1. Hospital discharge follow-up    2. Ischemic stroke    3. Left-sided weakness    4. Essential hypertension    5. Wheezing    6. Type 2 diabetes mellitus with other circulatory complication, without long-term current use of insulin    7. Personal history of nicotine dependence         Plan:       Hospital discharge follow-up    Admit Date: 7/19/25   Discharge Date: 7/21/25  Discharge Facility: Hospital    Medication Reconciliation:  Medications changed/added/deleted. Plavix, ASA, atorvastatin  New Prescriptions filled after discharge: yes  Discharge summary " reviewed:  yes  Pending test results at discharge reviewed:   not applicable  Follow up appointments scheduled:  yes   PT/OT. Needs referral to neurology  Follow up labs/tests ordered:   not applicable  Home Health ordered on discharge:   not applicable  Home Health company name:   DME ordered at discharge:   yes  Disease/illness education: causes of stroke, HTN, comorbid conditions. Need for PT/OT. Weakness/safety. Smoking risks. Vascular disease. Anxiety.  Discussion with other health care providers: not applicable  Establishment or re-establishment of referral orders for community resources: No other necessary community resources  Family and/or Caretaker present at visit?  not applicable    Discharge Summary:reviewed    How patient is feeling since discharge from the hospital?  Scared, anxious          CEREBRAL INFARCTION (STROKE):  - Monitored the patient's post-stroke condition, noting previous high blood pressure of 204/115 and left-sided weakness before ER presentation.  - Della experienced a cerebellar ischemic stroke between the brainstem and basal ganglia, affecting the left side.  - Explained the mechanism of ischemic stroke.  - Discussed the role of statins in lowering cholesterol and reducing stroke risk, advising patient to continue Lipitor, a high-intensity statin.  - Explained the difference between antiplatelet medications and anticoagulants, and prescribed Plavix, an antiplatelet aggregator, to prevent future clot formation.  - Referred the patient to Shawna Cabrales, vascular neurology NP, in [place].    LEFT-SIDED HEMIPLEGIA:  - Noted left-sided weakness and mobility issues affecting the patient's balance and ability to perform tasks   - Della currently uses a walker for mobility and safety post-stroke.  - Referred the patient to physical and occupational therapy for stroke recovery, focusing on left side weakness, and scheduled OT evaluation.  - Instructed the patient to perform physical therapy  exercises at home as directed by therapists to improve strength and mobility.  - Discussed the need for assistive devices (cane or walker) for stability.    HYPERTENSION:  - Measured blood pressure at 176 systolic, which I was concerned about but she reports the neurologist in the hospital stated that this is acceptable in post-stroke context and she does not want me to change her medication.  - Assessed that the patient's blood pressure is reactive to stress, causing fluctuations.      TYPE 2 DIABETES MELLITUS:  - Noted improvement in A1C from 8.0 to 7.1 over past months, indicating better diabetes control.  - Considered addition of Jardiance for diabetes management and educated the patient on cardioprotective and renal protective benefits of SGLT2 inhibitors.  - Advised the patient to continue Metformin and use glyburide as needed for high blood sugar.    COUGH/WHEEZING  - Documented the patient's lingering cough after exposure to a coworker's illness.  - Noted slight coarseness in breath sounds during exam and assessed the cough as persistent, possibly due to a respiratory infection.  - Prescribed albuterol inhaler  - Also prescribed Mucinex or Mucinex DM (store brand acceptable), 1200 mg daily or 600 mg twice daily for 5-7 days.    LOCALIZED EDEMA:  - Observed swelling in the patient's left foot, likely due to dependent edema from reduced movement post-stroke.  - Discussed management of edema through physical therapy and advised the patient to perform exercises at home to improve circulation and reduce swelling.    ADVERSE EFFECT OF ANTIHYPERLIPIDEMIC DRUGS:  - Documented that the patient experienced diarrhea after starting Lipitor, which may be an adverse effect as Metformin has not caused this issue before.  - Advised the patient to continue Lipitor for 1 week to see if side effects resolve, with the option to switch to Crestor if diarrhea persists.    HISTORY OF NICOTINE DEPENDENCE:  - Acknowledged the  patient's recent smoking cessation after stroke as a positive step for stroke prevention, reducing risk factors for future strokes.  - Encouraged the patient to maintain these efforts.    FAMILY HISTORY OF STROKE AND HEART DISEASE:  - Documented significant family history of cardiovascular issues: patient's mother had 3 strokes and father had 9 heart attacks, indicating family history of ischemic heart disease and circulatory system diseases.  - Discussed the need for aggressive prevention measures due to this family history and advised the patient to follow preventive measures to reduce personal risk of stroke.         Follow up for keep next visit with Dr Hollins.        This note was generated with the assistance of ambient listening technology. Verbal consent was obtained by the patient and accompanying visitor(s) for the recording of patient appointment to facilitate this note. I attest to having reviewed and edited the generated note for accuracy, though some syntax or spelling errors may persist. Please contact the author of this note for any clarification.      2025 Yvette Castro           [1]   Social History  Socioeconomic History    Marital status:    Tobacco Use    Smoking status: Former     Current packs/day: 0.00     Average packs/day: 0.5 packs/day for 20.5 years (10.3 ttl pk-yrs)     Types: Cigarettes     Start date:      Quit date: 2025     Years since quittin.0     Passive exposure: Past    Smokeless tobacco: Never   Vaping Use    Vaping status: Never Used   Substance and Sexual Activity    Alcohol use: No    Drug use: No    Sexual activity: Not Currently     Social Drivers of Health     Financial Resource Strain: Patient Declined (2025)    Overall Financial Resource Strain (CARDIA)     Difficulty of Paying Living Expenses: Patient declined   Food Insecurity: Patient Declined (2025)    Hunger Vital Sign     Worried About Running Out of Food in the Last Year:  Patient declined     Ran Out of Food in the Last Year: Patient declined   Transportation Needs: Patient Declined (7/20/2025)    PRAPARE - Transportation     Lack of Transportation (Medical): Patient declined     Lack of Transportation (Non-Medical): Patient declined   Physical Activity: Unknown (6/19/2024)    Exercise Vital Sign     Days of Exercise per Week: 4 days   Stress: Patient Declined (7/20/2025)    [employer] - Occupational Stress Questionnaire     Feeling of Stress : Patient declined   Housing Stability: Patient Declined (7/20/2025)    Housing Stability Vital Sign     Unable to Pay for Housing in the Last Year: Patient declined     Homeless in the Last Year: Patient declined   [2]   Current Outpatient Medications:     ALPRAZolam (XANAX) 0.5 MG tablet, Take 1 tablet (0.5 mg total) by mouth 2 (two) times daily., Disp: 60 tablet, Rfl: 5    aspirin (ECOTRIN) 81 MG EC tablet, Take 1 tablet (81 mg total) by mouth once daily., Disp: , Rfl: 0    atorvastatin (LIPITOR) 40 MG tablet, Take 1 tablet (40 mg total) by mouth every evening., Disp: 90 tablet, Rfl: 3    blood sugar diagnostic Strp, To check BG two times daily, to use with insurance preferred meter, Disp: 200 each, Rfl: 1    clopidogreL (PLAVIX) 75 mg tablet, Take 1 tablet (75 mg total) by mouth once daily., Disp: 30 tablet, Rfl: 11    glimepiride (AMARYL) 2 MG tablet, Take 1 tablet (2 mg total) by mouth 2 (two) times a day., Disp: 180 tablet, Rfl: 3    lancets Misc, To check BG two times daily, to use with insurance preferred meter, Disp: 200 each, Rfl: 1    losartan (COZAAR) 100 MG tablet, Take 1 tablet (100 mg total) by mouth once daily., Disp: 90 tablet, Rfl: 3    metFORMIN (GLUCOPHAGE-XR) 500 MG ER 24hr tablet, Take 1 tablet (500 mg total) by mouth 2 (two) times daily with meals., Disp: 180 tablet, Rfl: 3    multivitamin capsule, Take 1 capsule by mouth once daily., Disp: , Rfl:     vitamin D (VITAMIN D3) 1000 units Tab,  Take 5,000 Units by mouth once daily., Disp: , Rfl:     albuterol (VENTOLIN HFA) 90 mcg/actuation inhaler, Inhale 1 puff into the lungs every 6 (six) hours as needed for Wheezing. Rescue, Disp: 18 g, Rfl: 0    amLODIPine (NORVASC) 5 MG tablet, Take 1 tablet (5 mg total) by mouth once daily., Disp: 90 tablet, Rfl: 3    blood-glucose meter kit, To check BG two times daily, to use with insurance preferred meter, Disp: 1 each, Rfl: 0    clotrimazole-betamethasone 1-0.05% (LOTRISONE) cream, Apply topically 2 (two) times daily. (Patient not taking: Reported on 7/23/2025), Disp: 45 g, Rfl: 1    nicotine (NICODERM CQ) 21 mg/24 hr, Place 1 patch onto the skin once daily. (Patient not taking: Reported on 7/23/2025), Disp: 28 patch, Rfl: 11

## 2025-07-28 VITALS — SYSTOLIC BLOOD PRESSURE: 159 MMHG | HEART RATE: 72 BPM | DIASTOLIC BLOOD PRESSURE: 94 MMHG

## 2025-07-28 PROBLEM — R53.1 LEFT-SIDED WEAKNESS: Status: ACTIVE | Noted: 2025-07-28

## 2025-07-28 PROBLEM — R20.8 IMPAIRED SENSATION TO LIGHT TOUCH: Status: ACTIVE | Noted: 2025-07-28

## 2025-07-28 PROBLEM — M21.372 LEFT FOOT DROP: Status: ACTIVE | Noted: 2025-07-28

## 2025-07-28 PROBLEM — Z74.09 IMPAIRED FUNCTIONAL MOBILITY, BALANCE, GAIT, AND ENDURANCE: Status: ACTIVE | Noted: 2025-07-28

## 2025-07-28 NOTE — PROGRESS NOTES
Outpatient Rehab    Physical Therapy Evaluation (only)    Patient Name: Della Juarez  MRN: [id number]  YOB: 1955  Encounter Date: 7/24/2025    Therapy Diagnosis:   Encounter Diagnoses   Name Primary?    Cerebrovascular accident (CVA), unspecified mechanism Yes    Impaired functional mobility, balance, gait, and endurance     Left-sided weakness     Left foot drop     Impaired sensation to light touch      Physician: Marie Epps NP    Physician Orders: Eval and Treat  Medical Diagnosis: CVA (cerebral vascular accident)  Surgical Diagnosis: Not applicable for this Episode  Surgical Date: Not applicable for this Episode  Days Since Last Surgery: Not applicable for this Episode    Visit # / Visits Authorized:  1 / 1  Insurance Authorization Period: 7/21/2025 to 12/31/2025  Date of Evaluation: 7/24/2025  Plan of Care Certification: 7/24/2025 to 10/2/2025     Time In: 1651   Time Out: 1735  Total Time (in minutes): 44   Total Billable Time (in minutes):      Intake Outcome Measure for FOTO Survey    Therapist reviewed FOTO scores for Della Juarez on 7/24/2025.   FOTO report - see Media section or FOTO account episode details.     Intake Score (%): Not applicable for this Episode    Precautions:  Additional Precautions and Protocol Details: Fall balance    Subjective   History of Present Illness  Della is a 69 y.o. female who reports to physical therapy with a chief concern of Her main complaint is decreased function and strength in her left side. She would like to get all her mobility back..     The patient reports a medical diagnosis of Diagnosis  I63.9 (ICD-10-CM) - CVA (cerebral vascular accident).            Diagnostic tests related to this condition: MRI studies and CT scan.     CT Scan Details: CT 7/19/2025: Hypodensity in the right basal ganglia concerning for acute or subacute infarct  MRI Studies Details: MRI 7/19/2025: Acute ischemic infarction involving the anterior/rightward aspect of the  brainstem at the pontomedullary junction.Negative for intracranial hemorrhage.Old infarctions involving right basal ganglia.Moderate white matter microangiopathic changes.    Dominant Hand: Right  History of Present Condition/Illness: Della Juarez is a 69 year old female  who presented to [facility] after several days of left lower extremity numbness and weakness, tingling in left hand and elevated blood pressure. In ED -200 systolic. Patient was given 325 aspirin in ED. Patient was started on ASA, Plavix, statin therapy, and Lovenox. On day one of admission patient said that her symptoms were not resolved but slightly improved. Neurology is following. Patient was seen by Neurology, recommendations were made for patient to continue dual antiplatelet therapy for 21 days, then transition to monotherapy with plavix. Patient was also instructed to continue to take atorvastatin 40 mg daily. Patient will be discharged with outpatient rehab ordered. Patient agreed she is not going to continue smoking. Pt to follow up with neuro in 4 weeks. She reports that she started feeling off Wednesday with elevated BP and then went to the hospital Saturday. She reports since her stroke she is modified independent but everything is harder to do and takes her longer. She was given a rolling walker at the hospital but asked about using a rollator.          Activities of Daily Living  Social history was obtained from Patient.    General Prior Level of Function Comments: independent  General Current Level of Function Comments: more difficulty with cooking, getting dressed, hair in a ponytail, everything is harder  Patient Responsibilities: Shopping, Yard work, Laundry, Meal prep, Home management, Driving    Previously independent with activities of daily living? Yes     Currently independent with activities of daily living? No          Previously independent with instrumental activities of daily living? Yes     Currently independent  with instrumental activities of daily living? No              Pain     Patient reports a current pain level of 0/10. Pain at best is reported as 0/10. Pain at worst is reported as 0/10.             Review of Systems  Patient reports: Sleep Disturbance, Anxiety, Fatigue, Numbness, and Tingling        Treatment History  Treatments  Discharged From Past 30 Days: Inpatient acute facility  Previously Received Treatments: Yes  Currently Receiving Treatments: No    Living Arrangements  Living Situation  Housing: Home independently  Living Arrangements: Alone    Equipment/Treatments  Mobility Equipment: Wheeled walker    Single story home with 1 step to enter, lives alone, owns tub bench but cannot find it, long shower handle       Employment  Does the patient's condition impact their ability to work?: Yes     [employer], on feet all day, part time at both       Past Medical History/Physical Systems Review:   Della Juarez  has a past medical history of Diabetes mellitus, Hypertension, and Mental disorder.    Della Juarez  has a past surgical history that includes  section and Cosmetic surgery.    Della has a current medication list which includes the following prescription(s): albuterol, alprazolam, amlodipine, aspirin, atorvastatin, blood sugar diagnostic, blood-glucose meter, clopidogrel, clotrimazole-betamethasone 1-0.05%, glimepiride, lancets, losartan, metformin, multivitamin, nicotine, and vitamin d.    Review of patient's allergies indicates:   Allergen Reactions    Penicillins      Other reaction(s): Vomiting  Other reaction(s): Swelling  Other reaction(s): Nausea  Other reaction(s): Hives        Objective   Vital Signs  BP (!) 159/94   Pulse 72   BP Location: Right arm  BP Position: Sitting  BP Cuff Size: Adult         Posture  Patient presents with a Forward head position.     Shoulders are Rounded.                   Hip Strength - Planes of Motion   Right Strength Right Pain Left Strength  Left  Pain   Flexion (L2) 4-   3+     Extension           ABduction           ADduction           Internal Rotation           External Rotation               Knee Strength   Right Strength Right Pain Left Strength Left  Pain   Flexion (S2) 4-   3-     Prone Flexion           Extension (L3) 4   4-            Ankle/Foot Strength - Planes of Motion   Right Strength Right Pain Left Strength Left  Pain   Dorsiflexion (L4) 4   2-     Plantar Flexion (S1) 4   2+     Inversion           Eversion           Great Toe Flexion           Great Toe Extension (L5)           Lesser Toes Flexion           Lesser Toes Extension           Left foot drop       Coordination  Balance  Intact: Static Sitting and Dynamic Sitting  Impaired: Static Standing and Dynamic Standing  With further assess balance with YOUNGBLOOD or FGA next session       Impaired motor control of left side             Transfers Assessment  Sit to Stand Assistance: Supervision  Sit to Stand Assistance Details: relies on use of hands      Fall Risk  Functional mobility test results suggest the patient is: At Risk for Falls  Timed Up & Go (TUG)  Time: 26.48 seconds  Observations: Short strides  An older adult who takes >=12 seconds to complete the TUG is at risk for falling.    With rolling walker  Sit to Stand Testing  The patient completed 5 sit to stand transfers in 16.93 sec. with hands          .  Test EVAL  Details   TUG 26.48 with rolling walker   .Timed up and go score >14 seconds indicates risk for falls in older stroke patients (Jake et al, 2006)   5XSTS  16.93 without hands    >12 sec= fall risk for general elderly  >10 sec= balance/vestibular dysfunction (<59 y/o)  >14.2 sec= balance/vestibular dysfunction (>59 y/o)  >12 sec= fall risk for stroke   FGA  NT    Score:   <22/30 fall risk   <20/30 fall risk in older adults   <18/30 fall risk in Parkinsons   Scores by Decade:                        Age    Score                        40-49  (24-30)                        50-59  (25-30)                       60-69  (20-30)                       70-79  (16-30)   6MW NT  Age Gender Mean   60-69 Female  Male 572 meters (1876 feet)  538 meters (1765 feet)   70-79 Female  Male 527 meters (1729 feet)  471 meters (1545 feet)   80-89 Female  Male 417 meters (1368 feet)  392 meters ( 1286 feet)      10 meter walk NT  .10 Meter Walk Test Normative Values    10 Meter Walk Test 0-0.4 m/s = household walker  0.4-0.8 m/s = limited community ambulator   0.8-1.4 m/s = community ambultor  >1.4 m/s = can cross street safely           Ambulation Assistance Required  Surface With  Assistive Device Without Assistive Device Details   Level Supervision        Uneven         Curb           Stairs Assistance Required   Assistance Level Upper Extremity Support Pattern   Ascending Contact guard assist Two rails Non-reciprocal   Descending Contact guard assist Two rails Non-reciprocal        Ambulation Details    Educated on performing stairs up with strong leg, down with weak leg    Gait Analysis  Gait Analysis Details  With use of rolling walker, demonstrates decreased henry, increased left knee flexion,foot flat contact left foot, decreased step length          Time Entry(in minutes):  PT Evaluation (Low) Time Entry: 44    Assessment & Plan   Assessment  Della presents with a condition of Low complexity.   Presentation of Symptoms: Stable       Functional Limitations: Activity tolerance, Ambulating on uneven surfaces, Carrying objects, Community integration, Completing self-care activities, Completing work/school activities, Decreased ambulation distance/endurance, Driving, Fine motor coordination, Functional mobility, Gait limitations, Getting off the floor, Increased risk of fall, Maintaining balance, Squatting, Standing tolerance, Transfers  Impairments: Abnormal gait, Abnormal coordination, Impaired balance, Activity intolerance, Impaired physical strength, Lack of appropriate home exercise  "program, Abnormal muscle tone  Personal Factors Affecting Prognosis: Fear/anxiety, Transportation    Patient Goal for Therapy (PT): "get back to normal and be independent"  Prognosis: Good  Assessment Details: Della presents to outpatient therapy with her stepdad and daughter. She ambulates to clinic with rolling walker with supervision. Her mobility is as follows: transfers supervision, walk with rolling walker with supervision, steps 4 with handrails with contact guard and nonreciprocal pattern. She performs the TUG in 26.48 seconds with rolling walker which indicates increased fall risk. She performs the 5x sit to  16.93 seconds without hands which indicates decreased strength and increased fall risk. Her impairments include the following: decreased left sided strength and motor control, decreased sensation to light touch on left, left foot drop, decreased endurance, impaired balance, and impaired gait. Will assess balance, gait speed, endurance next session. She will benefit from physical therapy 2-3 a week for 10 weeks to improve impairments and return to previous level of function.     Plan  From a physical therapy perspective, the patient would benefit from: Skilled Rehab Services    Planned therapy interventions include: Therapeutic exercise, Therapeutic activities, Neuromuscular re-education, ADLs/IADLs, Community/work reintegration, Gait training, Orthotic management and training, and Work conditioning.    Planned modalities to include: Cryotherapy (cold pack), Electrical stimulation - attended, and Thermotherapy (hot pack).        Visit Frequency: 3 times Per Week for 10 Weeks.       This plan was discussed with Patient and Family.   Discussion participants: Agreed Upon Plan of Care             The patient's spiritual, cultural, and educational needs were considered, and the patient is agreeable to the plan of care and goals.     Education  Education was done with Patient. The patient's learning " style includes Listening. The patient Verbalizes understanding.         Patient educated on impairements, plan of care, and therapy goals. Educated on assisitive device as she was asking questions regarding rolling walker verse rollator. Educated on stair negotiation (up with strong and down with weak ).        Goals:   Active       1. Short Term Goals       .Patient will perform and tolerate home exercises 3x/week to improve functional independence.         Start:  07/28/25    Expected End:  08/28/25            .Patient will improve time on the 5 times sit to stand test to 10 sec or less to demonstrate improved balance, safety, and efficiency with functional mobility.         Start:  07/28/25    Expected End:  08/28/25            Patient will perform 12 steps with reciprocal pattern and 1 handrail to improve strength and functional mobility.        Start:  07/28/25    Expected End:  08/28/25            .Patient will decrease TUG speed by at least 5 seconds to increase safety with ambulation and decrease fall risk.         Start:  07/28/25    Expected End:  08/28/25               2. Long Term Goals        Patient will demonstrate independence with floor transfer to and from floor with upper extremity support.        Start:  07/28/25    Expected End:  10/02/25            ..Patient will tolerate ambulation on treadmill for at least 10 minutes to improve endurance and gait quality.         Start:  07/28/25    Expected End:  10/02/25            Patient will improve gait speed to 1.4m/s or above to demonstrate decreased fall risk and increased gait speed.        Start:  07/28/25    Expected End:  10/02/25            Patient will improve 6 minute walk distance above baseline by 200 feet or more to show improved endurance and gait speed.        Start:  07/28/25    Expected End:  10/02/25                Yvette Adams, PT

## 2025-07-29 ENCOUNTER — CLINICAL SUPPORT (OUTPATIENT)
Dept: REHABILITATION | Facility: HOSPITAL | Age: 70
End: 2025-07-29
Payer: MEDICARE

## 2025-07-29 VITALS — SYSTOLIC BLOOD PRESSURE: 183 MMHG | DIASTOLIC BLOOD PRESSURE: 93 MMHG | HEART RATE: 73 BPM

## 2025-07-29 DIAGNOSIS — R53.1 LEFT-SIDED WEAKNESS: Primary | ICD-10-CM

## 2025-07-29 PROCEDURE — 97530 THERAPEUTIC ACTIVITIES: CPT | Mod: PO

## 2025-07-30 ENCOUNTER — TELEPHONE (OUTPATIENT)
Dept: FAMILY MEDICINE | Facility: CLINIC | Age: 70
End: 2025-07-30
Payer: MEDICARE

## 2025-07-30 NOTE — TELEPHONE ENCOUNTER
Spoke with pt. States she has leave of work forms that needs to be filled out within 2 weeks. Pt advised that she can drop forms off for mimi to review and see if she is able to fill out. Pt voiced understanding. SOFIA

## 2025-07-30 NOTE — TELEPHONE ENCOUNTER
----- Message from Yvette sent at 7/30/2025  2:16 PM CDT -----  Pt was just released from the hospital and has leave forms that need to be filled out. She has 2 weeks to get the paper work back to them. She saw leny for a HFU appt   [phone]  ----- Message -----  From: Yvette Orr  Sent: 7/30/2025   2:33 PM CDT  To: Fatuma Hollins Staff    Pt was just released from the hospital and has leave forms that need to be filled out. She has 2 weeks to get the paper work back to them. She saw leny for a HFU appt   [phone]

## 2025-07-30 NOTE — PROGRESS NOTES
Outpatient Rehab    Occupational Therapy Visit    Patient Name: Della Juarez  MRN: [id number]  YOB: 1955  Encounter Date: 7/29/2025    Therapy Diagnosis: No diagnosis found.  Physician: Marie Epps NP    Physician Orders: Eval and Treat  Medical Diagnosis: CVA (cerebral vascular accident)  Surgical Diagnosis: Not applicable for this Episode   Surgical Date: Not applicable for this Episode  Days Since Last Surgery: Not applicable for this Episode    Visit # / Visits Authorized: 1 / 20  Insurance Authorization Period: 7/29/2025 to 12/31/2025  Date of Evaluation: 7/24/2025  Plan of Care Certification: 7/24/2025 to 9/18/2025      Time In: 1300   Time Out: 1345  Total Time (in minutes): 45   Total Billable Time (in minutes): 45    FOTO:  Intake Score (%): Not applicable for this Episode  Survey Score 2 (%): Not applicable for this Episode  Survey Score 3 (%): Not applicable for this Episode    Precautions:  Additional Precaution and Protocol Details: Standard, Fall, CVA  Additional Precautions and Protocol Details: Fall, balance, CVA    Subjective   Has not been sleeping well. Will take naps during the day and/or fall asleepduring the midmorning hours around 5 am..  Pain reported as 0/10. posterior neck discomfort possibly d/t sleeping position in bed    Objective   Vital Signs  BP (!) 183/93   Pulse 73   BP Location: Right arm  BP Position: Sitting  BP Cuff Size: Adult        Coordination  Right Box and Blocks Test: 37  Left Box and Blocks Test: 31  Compensatory movement patterns w/LUE, demonstrating left-shoulder hike              Treatment:  Therapeutic Activity  TA 1: Patient participated in a functional kitchen task involving placement of glass coffee cups into an overhead cabinet with SBA for safety. The task was performed with patient positioning her LUE in scaption. During the activity, tactile and verbal cues were provided, along with demonstration of appropriate body mechanics and movement  strategies to minimize discomfort and prevent musculoskeletal strain. Patient was receptive to feedback and repeated the task using the instructed techniques, reporting improved ease of movement and reduced difficulty. The activity was designed to facilitate safe UE use during overhead tasks, promote functional independence in the kitchen environment, and reinforce injury prevention strategies.  TA 2: Ed- patient concerned about shampooing her hair, and having episodes of incontinence d/t meds. Strategies suggested to support concerns.  Self Care and ADLs  ADL 1: Placed (I) hair in a low ponytail at the base of the hairline using B-hands without using compensatory strategies to perform task.  ADL 2: Patient doff/don B-shoes and socks (I) using B-hands to perform task, R>L. Min vc to incorporate the LUE more into performance. Extra time to complete.    Time Entry(in minutes):  Self Care/Home Management (ADLs) Time Entry: 20  Therapeutic Activity Time Entry: 25    Assessment & Plan   Assessment: Patient was very receptive to strategies and techniques for (I) promotion. Della able to return demonstration of taught techniques and strategies to aid in progress towards STG/LTG. Patient is very motivated to return to PLOF.  Evaluation/Treatment Tolerance: Patient tolerated treatment well    The patient will continue to benefit from skilled outpatient occupational therapy in order to address the deficits listed in the problem list on the initial evaluation, provide patient and family education, and maximize the patients level of independence in the home and community environments.     The patient's spiritual, cultural, and educational needs were considered, and the patient is agreeable to the plan of care and goals.     Education  Education was done with Patient. The patient's learning style includes Listening. The patient Verbalizes understanding.         Provided pictures and demonstration of strategies to perform hair  shampooing in a seated position at kitchen sink to promote safety during a functional task. Demonstration and verbal explanation of breathing techniques to prevent holding her breath when performing difficult tasks.       Plan: POC until 9/18/2025: Focus on strategies to perform LB dressing with increased safety, Bilateral use to perform overhead tasks and grooming, strategies to support incontinence.    Goals:   Active       LTG       Regain independence in lower-body dressing, including managing buttons and zippers, with minimal to no assistance. (Progressing)       Start:  07/24/25    Expected End:  09/18/25            Safely transfer in and out of the bathtub using adaptive strategies or devices, without requiring physical support from others. (Progressing)       Start:  07/24/25    Expected End:  09/18/25            Perform meal preparation tasks independently, including light cooking and utensil use, with improved endurance and LUE functional strength. (Progressing)       Start:  07/24/25    Expected End:  09/18/25               STG       Improve LUE strength and motor control to assist with basic grooming tasks such as using a curling iron or brushing hair with minimal assistance. (Progressing)       Start:  07/24/25    Expected End:  08/25/25            Increase light touch sensation awareness in LUE through sensory reeducation activities, improving ability to feel manage items in left-hand without visual assistance. (Progressing)       Start:  07/24/25    Expected End:  08/25/25            Improve transfer technique and safety by practicing getting in and out of a standard bathtub using adaptive equipment or strategies, with supervision as needed. (Progressing)       Start:  07/24/25    Expected End:  08/25/25                Samantha Leal, OT

## 2025-07-31 ENCOUNTER — CLINICAL SUPPORT (OUTPATIENT)
Dept: REHABILITATION | Facility: HOSPITAL | Age: 70
End: 2025-07-31
Payer: MEDICARE

## 2025-07-31 VITALS — DIASTOLIC BLOOD PRESSURE: 92 MMHG | HEART RATE: 76 BPM | SYSTOLIC BLOOD PRESSURE: 189 MMHG

## 2025-07-31 DIAGNOSIS — R53.1 LEFT-SIDED WEAKNESS: ICD-10-CM

## 2025-07-31 DIAGNOSIS — R20.8 IMPAIRED SENSATION TO LIGHT TOUCH: ICD-10-CM

## 2025-07-31 DIAGNOSIS — Z74.09 IMPAIRED FUNCTIONAL MOBILITY, BALANCE, GAIT, AND ENDURANCE: Primary | ICD-10-CM

## 2025-07-31 DIAGNOSIS — M21.372 LEFT FOOT DROP: ICD-10-CM

## 2025-07-31 PROCEDURE — 97110 THERAPEUTIC EXERCISES: CPT | Mod: PO

## 2025-07-31 PROCEDURE — 97112 NEUROMUSCULAR REEDUCATION: CPT | Mod: PO

## 2025-07-31 PROCEDURE — 97530 THERAPEUTIC ACTIVITIES: CPT | Mod: PO

## 2025-07-31 NOTE — PROGRESS NOTES
Outpatient Rehab    Physical Therapy Visit    Patient Name: Della Juarez  MRN: [id number]  YOB: 1955  Encounter Date: 7/31/2025    Therapy Diagnosis:   Encounter Diagnoses   Name Primary?    Impaired functional mobility, balance, gait, and endurance Yes    Left-sided weakness     Left foot drop     Impaired sensation to light touch      Physician: Marie Epps NP    Physician Orders: Eval and Treat  Medical Diagnosis: CVA (cerebral vascular accident)  Surgical Diagnosis: Not applicable for this Episode   Surgical Date: Not applicable for this Episode  Days Since Last Surgery: Not applicable for this Episode    Visit # / Visits Authorized:  1 / 20  Insurance Authorization Period: 7/22/2025 to 12/31/2025  Date of Evaluation: 7/24/2025  Plan of Care Certification: 7/28/2025 to 10/2/2025      PT/PTA: PT   Number of PTA visits since last PT visit:0  Time In: 1518   Time Out: 1605  Total Time (in minutes): 47   Total Billable Time (in minutes): 47    FOTO:  Intake Score (%): Not applicable for this Episode  Survey Score 2 (%): Not applicable for this Episode  Survey Score 3 (%): Not applicable for this Episode    Precautions:  Additional Precautions and Protocol Details: Fall, balance, CVA      Subjective   Pt reports she is having difficulty sleeping.She had anxiety prior to the stroke but it has been preventing her from sleeping well. She is not currently taking her anxiety medication..    tingling to left side of neck    Objective   Vital Signs  BP (!) 189/92   Pulse 76   BP Location: Right arm  BP Position: Sitting  BP Cuff Size: Adult               Treatment:  Therapeutic Exercise  TE 1: SciFit level 1 x 6 minutes, cues to maintain left hip in neutral. BUE/BLE  TE 2: Standing in parallel bars: heel raises x 20, attempted toe raises but pt with hip rocking compensatory pattern  TE 3: Standing in parallel bars:Marching x 10 each leg  Balance/Neuromuscular Re-Education  NMR 1: seated AROM ankle DF  with LLE extended 10 x 3 sec  NMR 2: reviewed supine DF within available range to improve motor activation  Therapeutic Activity  TA 1: sit to stands from mat x 10 with cues for technique and foot placement.  TA 2: lateral stepping at elevated  mat 8 ft x 3 laps    Time Entry(in minutes):  Neuromuscular Re-Education Time Entry: 15  Therapeutic Activity Time Entry: 15  Therapeutic Exercise Time Entry: 17    Assessment & Plan   Assessment: Della tolerated treatment session well. Today was initial treatment session. Education provided on stroke recovery. Discussed reaching out to physicians about anxiety medication she has stopped taking post stroke. Focus of treatment today was functional transfers and strength and endurance training to provide home exercise program for pt to begin. Good tolerance to activity with only fatigue reported post session. She remains appropriate for PT at this time.  Evaluation/Treatment Tolerance: Patient tolerated treatment well    The patient will continue to benefit from skilled outpatient physical therapy in order to address the deficits listed in the problem list on the initial evaluation, provide patient and family education, and maximize the patients level of independence in the home and community environments.     The patient's spiritual, cultural, and educational needs were considered, and the patient is agreeable to the plan of care and goals.     Education  Education was done with Patient. The patient's learning style includes Listening and Pictures/video. The patient Verbalizes understanding and Demonstrates understanding.         See Patient instructions section for HEP.       Plan: Continue to progress strength, gait, and functional mobility to return to PLOF    Goals:   Active       1. Short Term Goals       .Patient will perform and tolerate home exercises 3x/week to improve functional independence.   (Progressing)       Start:  07/28/25    Expected End:  08/28/25             .Patient will improve time on the 5 times sit to stand test to 10 sec or less to demonstrate improved balance, safety, and efficiency with functional mobility.   (Progressing)       Start:  07/28/25    Expected End:  08/28/25            Patient will perform 12 steps with reciprocal pattern and 1 handrail to improve strength and functional mobility.  (Progressing)       Start:  07/28/25    Expected End:  08/28/25            .Patient will decrease TUG speed by at least 5 seconds to increase safety with ambulation and decrease fall risk.   (Progressing)       Start:  07/28/25    Expected End:  08/28/25               2. Long Term Goals        Patient will demonstrate independence with floor transfer to and from floor with upper extremity support.  (Progressing)       Start:  07/28/25    Expected End:  10/02/25            ..Patient will tolerate ambulation on treadmill for at least 10 minutes to improve endurance and gait quality.   (Progressing)       Start:  07/28/25    Expected End:  10/02/25            Patient will improve gait speed to 1.4m/s or above to demonstrate decreased fall risk and increased gait speed.  (Progressing)       Start:  07/28/25    Expected End:  10/02/25            Patient will improve 6 minute walk distance above baseline by 200 feet or more to show improved endurance and gait speed.  (Progressing)       Start:  07/28/25    Expected End:  10/02/25                Vianey Jenkins PT

## 2025-07-31 NOTE — PROGRESS NOTES
Outpatient Care Management   - Patient Assessment    Patient: Della Juarez  MRN:  [id number]  Date of Service:  07/30/2025  Completed by:  William Moore LMSW  Referral Date: 07/25/2025    Reason for Visit   Patient presents with    Social Work Assessment     07/30/2025       Brief Summary:  received a referral from outpatient provider Kaylie Macario, PT for the following Low/Mod SW psychosocial needs with transportation and DME requests . The patient denies needing any additional assistance with for transportation, due to claims they were able to resolve any barriers related to transportation. Care plan was created in collaboration with patient/caregiver input.   completed the SDOH questionnaire, and reviewed the Pt upcoming appointments.       Future Appointments   Date Time Provider Department Center   7/31/2025  3:15 PM Vianey Jenkins, PT [facility]   8/1/2025 10:15 AM Padma Quinones, OT [facility]   8/1/2025 11:00 AM Vianey Jenkins, PT [facility]   8/4/2025  2:30 PM Mae Marti, PTA [facility]   8/5/2025  1:45 PM Yvette Adams, PT [facility]   8/6/2025 11:00 AM Vianey Jenkins, PT [facility]   8/6/2025 11:45 AM Kate Hyde, OTR [facility]   8/11/2025 11:00 AM Kate Hyde, OTR [facility]   8/11/2025  3:15 PM Yvette Adams, PT [facility]   8/12/2025  1:45 PM Vianey Jenkins, PT [facility]   8/15/2025  2:30 PM Kate Hyde, OTR [facility]   8/18/2025  2:30 PM Yvette Adams, PT [facility]   8/18/2025  3:15 PM Kate Hyde, OTR [facility]   8/19/2025  1:45 PM Yvette Adams, PT [facility] [place] [facility]   8/20/2025  2:30 PM Vianey Jenkins, PT [facility] [place] [facility]   8/25/2025  1:00 PM Vianey Jenkins, PT [facility] [place] [facility]   8/25/2025  1:45 PM Kate Hyde, OTR  [facility]   8/26/2025  2:30 PM Mae Marti, PTA [facility]   8/27/2025  1:00 PM Vianey Jenkins, PT [facility]   8/27/2025  2:30 PM Cantameliae, Kate, OTR [facility]   9/2/2025 11:30 AM Shawna Cabrales, NP [id number]   9/3/2025  1:45 PM Vianey Jenkins, PT [facility]   9/3/2025  2:30 PM Kate Hyde, OTR [facility]   9/4/2025  4:45 PM Mae Marti, PTA [facility]   9/8/2025  2:30 PM Mae Marti, PTA [facility]   9/10/2025  1:45 PM Padma Quinones, OT [facility]   9/10/2025  2:30 PM Yvette Adams, PT [facility]   9/11/2025  1:00 PM Vianey Jenkins, PT [facility]   9/15/2025  2:30 PM Yvette Adams, PT [facility]   9/16/2025 11:00 AM Mae Marti, PTA [facility]   9/16/2025 11:45 AM Kate Hyde, OTR [facility]   9/18/2025 11:45 AM Yvette Adams, PT [facility]   9/18/2025  1:00 PM Kate Hyde, OTR [facility]   9/22/2025  1:00 PM Jo-Ann Nunes, OT [facility]   9/22/2025  1:45 PM Mae Marti, PTA [facility]   9/23/2025  2:30 PM Yvette Adams, PT [facility]   9/24/2025  1:00 PM Kate Hyde, OTR [facility]   9/24/2025  1:45 PM Yvette Adams, PT [facility]   9/26/2025 10:40 AM Fatuma Hollins MD [facility]   9/29/2025  1:00 PM Yvette Adams, PT [facility]   9/29/2025  1:45 PM Jo-Ann Nunes, OT [facility]   9/30/2025  1:00 PM Mae Marti, PTA [facility]   10/1/2025  1:00 PM Jo-Ann Nunes, OT [facility]   10/1/2025  1:45 PM Mae Marti, PTA [facility]   10/6/2025  1:45 PM Yvette Adams, PT [facility]   10/6/2025  2:30 PM Jo-Ann Nunes, OT [facility]   10/7/2025  1:00 PM NaYvette almanzar, PT [facility]   10/8/2025  1:00 PM NaYvette almanzar, PT [facility]   10/8/2025  1:45 PM Jo-Ann Nunes, OT [facility]   10/13/2025  1:00 PM Jo-Ann Nunes, OT [facility]   10/13/2025  1:45 PM NaYvette almanzar, PT [facility]   10/14/2025  1:00 PM NaYvette almanzar, PT [facility]   10/15/2025  1:00 PM Jo-Ann Nunes, OT [facility]   10/15/2025  1:45 PM NaYvette almanzar, PT [facility]   12/10/2025 11:20 AM [facility] Js Moore LMSW  Neuro Therapy   [facility]  Phone: [phone]  Fax: [phone]

## 2025-08-01 ENCOUNTER — CLINICAL SUPPORT (OUTPATIENT)
Dept: REHABILITATION | Facility: HOSPITAL | Age: 70
End: 2025-08-01
Payer: MEDICARE

## 2025-08-01 VITALS — SYSTOLIC BLOOD PRESSURE: 181 MMHG | HEART RATE: 67 BPM | DIASTOLIC BLOOD PRESSURE: 98 MMHG

## 2025-08-01 DIAGNOSIS — Z74.09 IMPAIRED FUNCTIONAL MOBILITY, BALANCE, GAIT, AND ENDURANCE: Primary | ICD-10-CM

## 2025-08-01 DIAGNOSIS — R53.1 LEFT-SIDED WEAKNESS: ICD-10-CM

## 2025-08-01 DIAGNOSIS — R20.8 IMPAIRED SENSATION TO LIGHT TOUCH: ICD-10-CM

## 2025-08-01 DIAGNOSIS — R53.1 LEFT-SIDED WEAKNESS: Primary | ICD-10-CM

## 2025-08-01 DIAGNOSIS — Z78.9 IMPAIRED INSTRUMENTAL ACTIVITIES OF DAILY LIVING (IADL): ICD-10-CM

## 2025-08-01 DIAGNOSIS — M21.372 LEFT FOOT DROP: ICD-10-CM

## 2025-08-01 PROCEDURE — 97530 THERAPEUTIC ACTIVITIES: CPT | Mod: PO

## 2025-08-01 PROCEDURE — 97110 THERAPEUTIC EXERCISES: CPT | Mod: PO

## 2025-08-01 PROCEDURE — 97112 NEUROMUSCULAR REEDUCATION: CPT | Mod: PO

## 2025-08-04 ENCOUNTER — CLINICAL SUPPORT (OUTPATIENT)
Dept: REHABILITATION | Facility: HOSPITAL | Age: 70
End: 2025-08-04
Payer: MEDICARE

## 2025-08-04 DIAGNOSIS — Z74.09 IMPAIRED FUNCTIONAL MOBILITY, BALANCE, GAIT, AND ENDURANCE: Primary | ICD-10-CM

## 2025-08-04 DIAGNOSIS — R20.8 IMPAIRED SENSATION TO LIGHT TOUCH: ICD-10-CM

## 2025-08-04 DIAGNOSIS — R53.1 LEFT-SIDED WEAKNESS: ICD-10-CM

## 2025-08-04 DIAGNOSIS — M21.372 LEFT FOOT DROP: ICD-10-CM

## 2025-08-04 PROCEDURE — 97530 THERAPEUTIC ACTIVITIES: CPT | Mod: PO,CQ

## 2025-08-04 PROCEDURE — 97112 NEUROMUSCULAR REEDUCATION: CPT | Mod: PO,CQ

## 2025-08-04 NOTE — PROGRESS NOTES
Outpatient Rehab    Physical Therapy Visit    Patient Name: Della Juarez  MRN: [id number]  YOB: 1955  Encounter Date: 8/4/2025    Therapy Diagnosis:   Encounter Diagnoses   Name Primary?    Impaired functional mobility, balance, gait, and endurance Yes    Left-sided weakness     Left foot drop     Impaired sensation to light touch      Physician: Marie Epps NP    Physician Orders: Eval and Treat  Medical Diagnosis: CVA (cerebral vascular accident)  Surgical Diagnosis: Not applicable for this Episode   Surgical Date: Not applicable for this Episode  Days Since Last Surgery: Not applicable for this Episode    Visit # / Visits Authorized:  3 / 20  Insurance Authorization Period: 7/22/2025 to 12/31/2025  Date of Evaluation: 7/24/2025  Plan of Care Certification: 7/28/2025 to 10/2/2025      PT/PTA: PTA   Number of PTA visits since last PT visit:1  Time In: 1435   Time Out: 1515  Total Time (in minutes): 40   Total Billable Time (in minutes): 40    FOTO:  Intake Score (%): Not applicable for this Episode  Survey Score 2 (%): Not applicable for this Episode  Survey Score 3 (%): Not applicable for this Episode    Precautions:  Additional Precautions and Protocol Details: Additional Precautions and Protocol Details: Fall, balance, CVA      Subjective   Reports doing HEP at home by incorporating them into her activities of daily living..  Pain reported as 0/10.      Objective            Treatment:  Balance/Neuromuscular Re-Education  NMR 1: NuStep Level 3 10 minutes with B UE  NMR 2: Mat: Heel Slide 3x10 with mild resistance, L SAQ after DF with Yellow theraband resistance 3x10  Therapeutic Activity  TA 1: Ambulation with  feet and 150 feet with VC for increased L heel strike  TA 2: Sit<>Supine SBA    Time Entry(in minutes):  Neuromuscular Re-Education Time Entry: 25  Therapeutic Activity Time Entry: 15    Assessment & Plan   Assessment: Della provided good participation and effort during today's  session with treatment focused on Left lower extremity neuromuscular re-education and functional mobility.  Della tolerated activities with rest breaks and decreased repetitions. Good follow through with cues for increased Left heel strike during ambulation.  Evaluation/Treatment Tolerance: Patient tolerated treatment well    The patient will continue to benefit from skilled outpatient physical therapy in order to address the deficits listed in the problem list on the initial evaluation, provide patient and family education, and maximize the patients level of independence in the home and community environments.     The patient's spiritual, cultural, and educational needs were considered, and the patient is agreeable to the plan of care and goals.           Plan: Continue with POC to increase activities as patient tolerates.    Goals:   Active       1. Short Term Goals       .Patient will perform and tolerate home exercises 3x/week to improve functional independence.   (Met)       Start:  07/28/25    Expected End:  08/28/25    Resolved:  08/04/25         .Patient will improve time on the 5 times sit to stand test to 10 sec or less to demonstrate improved balance, safety, and efficiency with functional mobility.   (Progressing)       Start:  07/28/25    Expected End:  08/28/25            Patient will perform 12 steps with reciprocal pattern and 1 handrail to improve strength and functional mobility.  (Progressing)       Start:  07/28/25    Expected End:  08/28/25            .Patient will decrease TUG speed by at least 5 seconds to increase safety with ambulation and decrease fall risk.   (Progressing)       Start:  07/28/25    Expected End:  08/28/25               2. Long Term Goals        Patient will demonstrate independence with floor transfer to and from floor with upper extremity support.  (Progressing)       Start:  07/28/25    Expected End:  10/02/25            ..Patient will tolerate ambulation on treadmill  for at least 10 minutes to improve endurance and gait quality.   (Progressing)       Start:  07/28/25    Expected End:  10/02/25            Patient will improve gait speed to 1.4m/s or above to demonstrate decreased fall risk and increased gait speed.  (Progressing)       Start:  07/28/25    Expected End:  10/02/25            Patient will improve 6 minute walk distance above baseline by 200 feet or more to show improved endurance and gait speed.  (Progressing)       Start:  07/28/25    Expected End:  10/02/25                Mae Marti, PTA

## 2025-08-04 NOTE — PROGRESS NOTES
Outpatient Rehab    Physical Therapy Visit    Patient Name: Della Juarez  MRN: [id number]  YOB: 1955  Encounter Date: 8/1/2025    Therapy Diagnosis:   Encounter Diagnoses   Name Primary?    Impaired functional mobility, balance, gait, and endurance Yes    Left-sided weakness     Left foot drop     Impaired sensation to light touch      Physician: Marie Epps NP    Physician Orders: Eval and Treat  Medical Diagnosis: CVA (cerebral vascular accident)  Surgical Diagnosis: Not applicable for this Episode   Surgical Date: Not applicable for this Episode  Days Since Last Surgery: Not applicable for this Episode    Visit # / Visits Authorized:  2 / 20  Insurance Authorization Period: 7/22/2025 to 12/31/2025  Date of Evaluation: 7/24/2025  Plan of Care Certification: 7/28/2025 to 10/2/2025      PT/PTA: PT   Number of PTA visits since last PT visit:0  Time In: 1107   Time Out: 1147  Total Time (in minutes): 40   Total Billable Time (in minutes): 40    FOTO:  Intake Score (%): Not applicable for this Episode  Survey Score 2 (%): Not applicable for this Episode  Survey Score 3 (%): Not applicable for this Episode    Precautions:  Additional Precautions and Protocol Details: Fall, balance, CVA      Subjective   She has practiced her HEP and noticed improvement in DF..  Pain reported as 1/10. stiffness in neck but has improved post OT.    Objective            Treatment:  Therapeutic Exercise  TE 1: SciFit level 1.5 x 8 minutes, cues to maintain left hip in neutral. BUE/BLE  Balance/Neuromuscular Re-Education  NMR 1: ESTIM to left quad performing SAQ (mA 37, ramp 2, 10/10 on/off) in supine over bolster x 8 minutes. Addition of anterior tibialis (mA , ramp 2, 10/10 on/off) x 5 minutes  NMR 2: supine ankle eversion with yellow TB x 20    Time Entry(in minutes):  Neuromuscular Re-Education Time Entry: 30  Therapeutic Exercise Time Entry: 10    Assessment & Plan   Assessment: Gait tolerated treatment session  well. Focus today was motor activation to left quadriceps and tibialis anterior to improve gait and safe mobility using ESTIM. No adverse response noted during or post treatment session to ESTIM. She remains appropriate for PT at this time.  Evaluation/Treatment Tolerance: Patient tolerated treatment well    The patient will continue to benefit from skilled outpatient physical therapy in order to address the deficits listed in the problem list on the initial evaluation, provide patient and family education, and maximize the patients level of independence in the home and community environments.     The patient's spiritual, cultural, and educational needs were considered, and the patient is agreeable to the plan of care and goals.     Education  Education was done with Patient. The patient's learning style includes Listening. The patient Verbalizes understanding.         Continue with HEP.       Plan: Continue to progress strength, gait, and functional mobility to return to PLOF    Goals:   Active       1. Short Term Goals       .Patient will perform and tolerate home exercises 3x/week to improve functional independence.   (Met)       Start:  07/28/25    Expected End:  08/28/25    Resolved:  08/04/25         .Patient will improve time on the 5 times sit to stand test to 10 sec or less to demonstrate improved balance, safety, and efficiency with functional mobility.   (Progressing)       Start:  07/28/25    Expected End:  08/28/25            Patient will perform 12 steps with reciprocal pattern and 1 handrail to improve strength and functional mobility.  (Progressing)       Start:  07/28/25    Expected End:  08/28/25            .Patient will decrease TUG speed by at least 5 seconds to increase safety with ambulation and decrease fall risk.   (Progressing)       Start:  07/28/25    Expected End:  08/28/25               2. Long Term Goals        Patient will demonstrate independence with floor transfer to and from floor  with upper extremity support.  (Progressing)       Start:  07/28/25    Expected End:  10/02/25            ..Patient will tolerate ambulation on treadmill for at least 10 minutes to improve endurance and gait quality.   (Progressing)       Start:  07/28/25    Expected End:  10/02/25            Patient will improve gait speed to 1.4m/s or above to demonstrate decreased fall risk and increased gait speed.  (Progressing)       Start:  07/28/25    Expected End:  10/02/25            Patient will improve 6 minute walk distance above baseline by 200 feet or more to show improved endurance and gait speed.  (Progressing)       Start:  07/28/25    Expected End:  10/02/25                Vianey Jenkins PT

## 2025-08-05 ENCOUNTER — CLINICAL SUPPORT (OUTPATIENT)
Dept: REHABILITATION | Facility: HOSPITAL | Age: 70
End: 2025-08-05
Payer: MEDICARE

## 2025-08-05 ENCOUNTER — TELEPHONE (OUTPATIENT)
Dept: FAMILY MEDICINE | Facility: CLINIC | Age: 70
End: 2025-08-05
Payer: MEDICARE

## 2025-08-05 DIAGNOSIS — I63.9 CEREBROVASCULAR ACCIDENT (CVA), UNSPECIFIED MECHANISM: ICD-10-CM

## 2025-08-05 DIAGNOSIS — Z74.09 IMPAIRED FUNCTIONAL MOBILITY, BALANCE, GAIT, AND ENDURANCE: Primary | ICD-10-CM

## 2025-08-05 DIAGNOSIS — R20.8 IMPAIRED SENSATION TO LIGHT TOUCH: ICD-10-CM

## 2025-08-05 DIAGNOSIS — R53.1 LEFT-SIDED WEAKNESS: ICD-10-CM

## 2025-08-05 DIAGNOSIS — I63.9 ISCHEMIC STROKE: Primary | ICD-10-CM

## 2025-08-05 DIAGNOSIS — M21.372 LEFT FOOT DROP: ICD-10-CM

## 2025-08-05 PROBLEM — Z78.9 IMPAIRED INSTRUMENTAL ACTIVITIES OF DAILY LIVING (IADL): Status: ACTIVE | Noted: 2025-07-24

## 2025-08-05 PROCEDURE — 97530 THERAPEUTIC ACTIVITIES: CPT | Mod: PO

## 2025-08-05 RX ORDER — CLOPIDOGREL BISULFATE 75 MG/1
75 TABLET ORAL DAILY
Qty: 30 TABLET | Refills: 11 | Status: SHIPPED | OUTPATIENT
Start: 2025-08-05 | End: 2026-08-05

## 2025-08-05 NOTE — PROGRESS NOTES
Outpatient Rehab    Occupational Therapy Visit    Patient Name: Della Juarez  MRN: [id number]  YOB: 1955  Encounter Date: 8/1/2025    Therapy Diagnosis:   Encounter Diagnoses   Name Primary?    Left-sided weakness Yes    Impaired instrumental activities of daily living (IADL)      Physician: Marie Epps NP    Physician Orders: Eval and Treat  Medical Diagnosis: CVA (cerebral vascular accident)  Surgical Diagnosis: Not applicable for this Episode   Surgical Date: Not applicable for this Episode  Days Since Last Surgery: Not applicable for this Episode    Visit # / Visits Authorized: 3 / 20  Insurance Authorization Period: 7/29/2025 to 12/31/2025  Date of Evaluation: 7/24/2025  Plan of Care Certification: 7/24/2025 to 9/18/2025      Time In: 1021   Time Out: 1105  Total Time (in minutes): 44   Total Billable Time (in minutes): 40    FOTO:  Intake Score (%): Not applicable for this Episode  Survey Score 2 (%): Not applicable for this Episode  Survey Score 3 (%): Not applicable for this Episode    Precautions:  Additional Precaution and Protocol Details: Standard, Fall, CVA  Additional Precautions and Protocol Details: Fall, balance, CVA    Subjective   Still not sleeping well. States she can't stop thinking about concerns. She's been purposefully using the left hand..  Pain reported as 0/10. discomfort in neck, but did not describe it as pain    Objective   Vital Signs  BP (!) 181/98   Pulse 67   BP Location: Right arm  BP Position: Sitting  BP Cuff Size: Adult long               Treatment:  Therapeutic Exercise  TE 1: Gentle stretching of the soft tissues of the left wrist and hand (including gentle mobilization of the related joints as appropriate/ necessary) to decrease edema and improve PROM, potentially allowing for increases in active movement.  TE 2: Gentle stretching and mobilization of the soft-tissues throughout the left shoulder, including levator, serratus anterior, subscapularis,  anterior complex, lats and combined internal rotators to allow for increased PROM.  TE 3: AROM shoulder rolls 2x10 in each direction  TE 4: AROM gentle neck rolls x5 in each direction  Therapeutic Activity  TA 1: Reviewed strategies to facilitate improved sleep hygiene, including primarily, keeping a sleep journal so that if she can not sleep or if she awakes in the middle of the night she can write down her worries and concerns and then try and return to sleep.  TA 2: Picking up and placing large royer one at a time in 8 groups of 4 to improve finger<>palm translation for in-hand manipulation.  TA 3: Pt completed serial opposition with large royer for 8 groups of 4 to improve FMC.    Time Entry(in minutes):  Therapeutic Activity Time Entry: 23  Therapeutic Exercise Time Entry: 17    Assessment & Plan   Assessment: Della has improving movement in the left arm.  Coordination is difficult and takes increased effort but she was able to complete all tasks requested today.  We will continue to work on fine and gross motor coordination, as well as proximal coordination for functional reach.  Evaluation/Treatment Tolerance: Patient tolerated treatment well    The patient will continue to benefit from skilled outpatient occupational therapy in order to address the deficits listed in the problem list on the initial evaluation, provide patient and family education, and maximize the patients level of independence in the home and community environments.     The patient's spiritual, cultural, and educational needs were considered, and the patient is agreeable to the plan of care and goals.           Plan: POC until 9/18/2025: Focus on strategies to perform LB dressing with increased safety, coordination    Goals:   Active       LTG       Regain independence in lower-body dressing, including managing buttons and zippers, with minimal to no assistance. (Progressing)       Start:  07/24/25    Expected End:  09/18/25             Safely transfer in and out of the bathtub using adaptive strategies or devices, without requiring physical support from others. (Progressing)       Start:  07/24/25    Expected End:  09/18/25            Perform meal preparation tasks independently, including light cooking and utensil use, with improved endurance and LUE functional strength. (Progressing)       Start:  07/24/25    Expected End:  09/18/25               STG       Improve LUE strength and motor control to assist with basic grooming tasks such as using a curling iron or brushing hair with minimal assistance. (Progressing)       Start:  07/24/25    Expected End:  08/25/25            Increase light touch sensation awareness in LUE through sensory reeducation activities, improving ability to feel manage items in left-hand without visual assistance. (Progressing)       Start:  07/24/25    Expected End:  08/25/25            Improve transfer technique and safety by practicing getting in and out of a standard bathtub using adaptive equipment or strategies, with supervision as needed. (Progressing)       Start:  07/24/25    Expected End:  08/25/25                Padma Quinones OT

## 2025-08-05 NOTE — TELEPHONE ENCOUNTER
----- Message from Pearl sent at 8/5/2025 11:20 AM CDT -----  The patient is having someone come drop off leave of absent papers today at 1.  Refill Plavix 75 mg 1 a day. She got this from the hospital. She needs it sent to   [facility] on [address] pt's # [phone] GH

## 2025-08-05 NOTE — PROGRESS NOTES
Outpatient Rehab    Physical Therapy Visit    Patient Name: Della Juarez  MRN: [id number]  YOB: 1955  Encounter Date: 8/5/2025    Therapy Diagnosis:   Encounter Diagnoses   Name Primary?    Impaired functional mobility, balance, gait, and endurance Yes    Left-sided weakness     Left foot drop     Impaired sensation to light touch      Physician: Marie Epps NP    Physician Orders: Eval and Treat  Medical Diagnosis: CVA (cerebral vascular accident)  Surgical Diagnosis: Not applicable for this Episode   Surgical Date: Not applicable for this Episode  Days Since Last Surgery: Not applicable for this Episode    Visit # / Visits Authorized:  4 / 20  Insurance Authorization Period: 7/22/2025 to 12/31/2025  Date of Evaluation: 7/24/2025  Plan of Care Certification: 7/28/2025 to 10/2/2025      PT/PTA: PT   Number of PTA visits since last PT visit:0  Time In: 1346   Time Out: 1435  Total Time (in minutes): 49   Total Billable Time (in minutes):      FOTO:  Intake Score (%): 50  Survey Score 2 (%): Not applicable for this Episode  Survey Score 3 (%): Not applicable for this Episode    Precautions:  Additional Precautions and Protocol Details:  Fall, balance, CVA      Subjective   I'm tired..  Pain reported as 0/10.      Objective            Treatment:  Therapeutic Activity  TA 1: ambulation with rollator with supervision 250 feet to improve ambulation with rollator, rollator management  TA 2: sit to and from stand with supervision  TA 3: ambulatory transfer with rolling walker and rollator with supervision  TA 4: step up onto 4in step and controlled lower with bilateral upper extremity on parallel bars 15reps each side focusing on improving hip flex/knee flexion/dorsiflexion to step up and motor control of left when lowering with right leg  TA 5: side steps in parallel bars with theraband above knees (blue) in mini squat position 3reps each side focusing on maintaining squat and lifting left leg and not  luis eduardo , educated on light support through upper extremity    Time Entry(in minutes):  Therapeutic Activity Time Entry: 49    Assessment & Plan   Assessment: Della tolerated session well. She reports fatigue but agreeable to session. She is wanting to try rollator and demonstrated good safety with device just requiring occasional cues for brake management. She reports that rollator would allow her to be more independent at home and allow her to sit when fatigued. Upon assessment, Della demonstrates improved quality of gait with rollator. She is able to safely use the breaks and it allows her to be more independent in the community as she can rest as needed. She is unable to use a cane at this time due to impaired balance and left foot drop and her mobility is greatly improved with the rollator. She reports anxiety but stopped taking xanax. Educated her to talk with PCP about neurologist about continuing xanax.   Evaluation/Treatment Tolerance: Patient tolerated treatment well    The patient will continue to benefit from skilled outpatient physical therapy in order to address the deficits listed in the problem list on the initial evaluation, provide patient and family education, and maximize the patients level of independence in the home and community environments.     The patient's spiritual, cultural, and educational needs were considered, and the patient is agreeable to the plan of care and goals.     Education  Education was done with Patient. The patient's learning style includes Listening. The patient Verbalizes understanding.         Educated on left knee control, benefits of electrical stimulation to improve left lower extremity return        Plan: Continue with POC to increase activities as patient tolerates. Try bioness in next session for left foot clearance with ambulation    Goals:   Active       1. Short Term Goals       .Patient will perform and tolerate home exercises 3x/week to improve functional  independence.   (Met)       Start:  07/28/25    Expected End:  08/28/25    Resolved:  08/04/25         .Patient will improve time on the 5 times sit to stand test to 10 sec or less to demonstrate improved balance, safety, and efficiency with functional mobility.   (Progressing)       Start:  07/28/25    Expected End:  08/28/25            Patient will perform 12 steps with reciprocal pattern and 1 handrail to improve strength and functional mobility.  (Progressing)       Start:  07/28/25    Expected End:  08/28/25            .Patient will decrease TUG speed by at least 5 seconds to increase safety with ambulation and decrease fall risk.   (Progressing)       Start:  07/28/25    Expected End:  08/28/25               2. Long Term Goals        Patient will demonstrate independence with floor transfer to and from floor with upper extremity support.  (Progressing)       Start:  07/28/25    Expected End:  10/02/25            ..Patient will tolerate ambulation on treadmill for at least 10 minutes to improve endurance and gait quality.   (Progressing)       Start:  07/28/25    Expected End:  10/02/25            Patient will improve gait speed to 1.4m/s or above to demonstrate decreased fall risk and increased gait speed.  (Progressing)       Start:  07/28/25    Expected End:  10/02/25            Patient will improve 6 minute walk distance above baseline by 200 feet or more to show improved endurance and gait speed.  (Progressing)       Start:  07/28/25    Expected End:  10/02/25                Yvette Adams, PT

## 2025-08-06 ENCOUNTER — CLINICAL SUPPORT (OUTPATIENT)
Dept: REHABILITATION | Facility: HOSPITAL | Age: 70
End: 2025-08-06
Payer: MEDICARE

## 2025-08-06 DIAGNOSIS — R20.8 IMPAIRED SENSATION TO LIGHT TOUCH: ICD-10-CM

## 2025-08-06 DIAGNOSIS — Z78.9 IMPAIRED INSTRUMENTAL ACTIVITIES OF DAILY LIVING (IADL): ICD-10-CM

## 2025-08-06 DIAGNOSIS — Z74.09 IMPAIRED FUNCTIONAL MOBILITY, BALANCE, GAIT, AND ENDURANCE: Primary | ICD-10-CM

## 2025-08-06 DIAGNOSIS — R53.1 LEFT-SIDED WEAKNESS: Primary | ICD-10-CM

## 2025-08-06 DIAGNOSIS — M21.372 LEFT FOOT DROP: ICD-10-CM

## 2025-08-06 DIAGNOSIS — R53.1 LEFT-SIDED WEAKNESS: ICD-10-CM

## 2025-08-06 PROCEDURE — 97530 THERAPEUTIC ACTIVITIES: CPT | Mod: PO

## 2025-08-06 PROCEDURE — 97110 THERAPEUTIC EXERCISES: CPT | Mod: PO

## 2025-08-06 NOTE — PROGRESS NOTES
"  Outpatient Rehab    Occupational Therapy Visit    Patient Name: Della Juarez  MRN: [id number]  YOB: 1955  Encounter Date: 8/6/2025    Therapy Diagnosis: Impaired L UE coordination  Impaired L UE strength  Physician: Marie Epps NP    Physician Orders: Eval and Treat  Medical Diagnosis: CVA (cerebral vascular accident)  Surgical Diagnosis: Not applicable for this Episode   Surgical Date: Not applicable for this Episode  Days Since Last Surgery: Not applicable for this Episode    Visit # / Visits Authorized: 3 / 20  Insurance Authorization Period: 7/29/2025 to 12/31/2025  Date of Evaluation: 7/24/2025  Plan of Care Certification: 7/24/2025 to 9/18/2025      Time In: 1148   Time Out: 1230  Total Time (in minutes): 42   Total Billable Time (in minutes): 42    FOTO:  Intake Score (%): Not applicable for this Episode  Survey Score 2 (%): Not applicable for this Episode  Survey Score 3 (%): Not applicable for this Episode    Precautions:  Additional Precaution and Protocol Details: Standard, Fall, CVA  Additional Precautions and Protocol Details: Fall, balance, CVA    Subjective   " I want to be able to go back to work. ".         Objective            Treatment: Della was seen in OT with focus on L hand FMC to improve her in hand manipulation and object manipulation skills for independence with ADLs and IADLs.  Therapeutic Activity  TA 1: L hand isopheres  TA 2: Bilateral hand integration activity holding/modified shuffling cards  TA 3: Bilateral hand integration activity dealing cards  TA 4: Colored bead stringing with thumb to 2nd-5th digit tips x 45 beads  TA 5: beads lacing/unlacing 2nd-4th digit tip to palm x 45 beads    Time Entry(in minutes):  Therapeutic Activity Time Entry: 42    Assessment & Plan   Assessment: Della put forth great effort and is motivated to return to her PLOF. FMC handout given and Della reported she will pick 2 activities to work on for the week. She verbalized understanding " of her HEP.   Evaluation/Treatment Tolerance: Patient tolerated treatment well    Della will continue to benefit from skilled outpatient occupational therapy in order to address the deficits listed in the problem list on the initial evaluation, provide patient and family education, and maximize the patients level of independence in the home and community environments. The patient's spiritual, cultural, and educational needs were considered, and the patient is agreeable to the plan of care and goals.           Plan: POC until 9/18/2025: Focus on strategies to perform LB dressing with increased safety, Bilateral use to perform overhead tasks and grooming, strategies to support incontinence.    Goals:   Active       LTG       Regain independence in lower-body dressing, including managing buttons and zippers, with minimal to no assistance. (Progressing)       Start:  07/24/25    Expected End:  09/18/25            Safely transfer in and out of the bathtub using adaptive strategies or devices, without requiring physical support from others. (Progressing)       Start:  07/24/25    Expected End:  09/18/25            Perform meal preparation tasks independently, including light cooking and utensil use, with improved endurance and LUE functional strength. (Progressing)       Start:  07/24/25    Expected End:  09/18/25               STG       Improve LUE strength and motor control to assist with basic grooming tasks such as using a curling iron or brushing hair with minimal assistance. (Progressing)       Start:  07/24/25    Expected End:  08/25/25            Increase light touch sensation awareness in LUE through sensory reeducation activities, improving ability to feel manage items in left-hand without visual assistance. (Progressing)       Start:  07/24/25    Expected End:  08/25/25            Improve transfer technique and safety by practicing getting in and out of a standard bathtub using adaptive equipment or strategies,  with supervision as needed. (Progressing)       Start:  07/24/25    Expected End:  08/25/25                Kate Hyde OTR

## 2025-08-06 NOTE — PATIENT INSTRUCTIONS
"Fine Motor Coordination    1. Place palm on table;raise and lower fingers one at a time.  2. Rest hand on table and spread fingers wide and bring together  3. Keep time to music with each finger.  4. Make an "0" by touching thumb to each finger at a time.  5.  coins, buttons, washers, bolts or beans of assorted sizes one at a time.  6.  a handful of coins, buttons, washers, bolts or beans of assorted sizes and drop one at a time.  7. Stack coins.  8.  Screw and unscrew different sized nuts and bolts.  9.  Crumple a piece of paper into a small ball.  10. Thump the ball of paper with each finger.  11. Hold a deck of cards in hand and drop one at a time.  12. Turn one card over at a time.  13  Shuffle and deal playing cards.  14. Tie knots in a string.  15. Practice buttoning and unbuttoning.  16.  Practice using a computer keyboard.  17. Do simple finger exercises on piano.  18. String beads.  19. Toss small ball or bean bag up in air and catch.  20. Toss small ball  Or bean bag from one hand to the other.  21. Roll small ball on table using fingers.  22. Toss small ball or bean bag in air and clap hands before catching.  23. Work on model car, plane, etc.   24. Play games with pieces like checkers.  25. Turn pages in book or magazine.    Strength:  1. Squeeze clothes pins.  2.  small items with tweezers.  3. Wring out wet wash cloth.    Handwritin. Use a pencil or pen and turn it in your hand.  2. Roll pen or pencil between two fingers.  3. Tap pencil on table.  4. Trace letters, numbers, lines and curves.  5. Color pictures.   "

## 2025-08-07 ENCOUNTER — OUTPATIENT CASE MANAGEMENT (OUTPATIENT)
Dept: ADMINISTRATIVE | Facility: OTHER | Age: 70
End: 2025-08-07
Payer: MEDICARE

## 2025-08-11 ENCOUNTER — CLINICAL SUPPORT (OUTPATIENT)
Dept: REHABILITATION | Facility: HOSPITAL | Age: 70
End: 2025-08-11
Payer: MEDICARE

## 2025-08-11 VITALS — HEART RATE: 83 BPM | DIASTOLIC BLOOD PRESSURE: 93 MMHG | SYSTOLIC BLOOD PRESSURE: 176 MMHG

## 2025-08-11 DIAGNOSIS — Z74.09 IMPAIRED FUNCTIONAL MOBILITY, BALANCE, GAIT, AND ENDURANCE: Primary | ICD-10-CM

## 2025-08-11 DIAGNOSIS — R53.1 LEFT-SIDED WEAKNESS: ICD-10-CM

## 2025-08-11 DIAGNOSIS — M21.372 LEFT FOOT DROP: ICD-10-CM

## 2025-08-11 DIAGNOSIS — Z78.9 IMPAIRED INSTRUMENTAL ACTIVITIES OF DAILY LIVING (IADL): ICD-10-CM

## 2025-08-11 DIAGNOSIS — R20.8 IMPAIRED SENSATION TO LIGHT TOUCH: ICD-10-CM

## 2025-08-11 DIAGNOSIS — R53.1 LEFT-SIDED WEAKNESS: Primary | ICD-10-CM

## 2025-08-11 PROCEDURE — 97110 THERAPEUTIC EXERCISES: CPT | Mod: PO

## 2025-08-11 PROCEDURE — 97530 THERAPEUTIC ACTIVITIES: CPT | Mod: PO

## 2025-08-12 ENCOUNTER — CLINICAL SUPPORT (OUTPATIENT)
Dept: REHABILITATION | Facility: HOSPITAL | Age: 70
End: 2025-08-12
Payer: MEDICARE

## 2025-08-12 DIAGNOSIS — Z74.09 IMPAIRED FUNCTIONAL MOBILITY, BALANCE, GAIT, AND ENDURANCE: Primary | ICD-10-CM

## 2025-08-12 DIAGNOSIS — M21.372 LEFT FOOT DROP: ICD-10-CM

## 2025-08-12 DIAGNOSIS — R53.1 LEFT-SIDED WEAKNESS: ICD-10-CM

## 2025-08-12 DIAGNOSIS — R20.8 IMPAIRED SENSATION TO LIGHT TOUCH: ICD-10-CM

## 2025-08-12 PROCEDURE — 97530 THERAPEUTIC ACTIVITIES: CPT | Mod: PO

## 2025-08-12 PROCEDURE — 97110 THERAPEUTIC EXERCISES: CPT | Mod: PO

## 2025-08-14 DIAGNOSIS — F41.1 GAD (GENERALIZED ANXIETY DISORDER): ICD-10-CM

## 2025-08-14 RX ORDER — ALPRAZOLAM 0.5 MG/1
0.5 TABLET ORAL 2 TIMES DAILY
Qty: 60 TABLET | Refills: 5 | Status: SHIPPED | OUTPATIENT
Start: 2025-08-14

## 2025-08-15 ENCOUNTER — CLINICAL SUPPORT (OUTPATIENT)
Dept: REHABILITATION | Facility: HOSPITAL | Age: 70
End: 2025-08-15
Payer: MEDICARE

## 2025-08-15 DIAGNOSIS — R53.1 LEFT-SIDED WEAKNESS: Primary | ICD-10-CM

## 2025-08-15 PROCEDURE — 97110 THERAPEUTIC EXERCISES: CPT | Mod: PO

## 2025-08-18 ENCOUNTER — CLINICAL SUPPORT (OUTPATIENT)
Dept: REHABILITATION | Facility: HOSPITAL | Age: 70
End: 2025-08-18
Payer: MEDICARE

## 2025-08-18 DIAGNOSIS — M21.372 LEFT FOOT DROP: ICD-10-CM

## 2025-08-18 DIAGNOSIS — R20.8 IMPAIRED SENSATION TO LIGHT TOUCH: ICD-10-CM

## 2025-08-18 DIAGNOSIS — Z74.09 IMPAIRED FUNCTIONAL MOBILITY, BALANCE, GAIT, AND ENDURANCE: Primary | ICD-10-CM

## 2025-08-18 DIAGNOSIS — R53.1 LEFT-SIDED WEAKNESS: Primary | ICD-10-CM

## 2025-08-18 DIAGNOSIS — Z78.9 IMPAIRED INSTRUMENTAL ACTIVITIES OF DAILY LIVING (IADL): ICD-10-CM

## 2025-08-18 DIAGNOSIS — R53.1 LEFT-SIDED WEAKNESS: ICD-10-CM

## 2025-08-18 PROCEDURE — 97110 THERAPEUTIC EXERCISES: CPT | Mod: PO

## 2025-08-19 ENCOUNTER — CLINICAL SUPPORT (OUTPATIENT)
Dept: REHABILITATION | Facility: HOSPITAL | Age: 70
End: 2025-08-19
Payer: MEDICARE

## 2025-08-19 DIAGNOSIS — R20.8 IMPAIRED SENSATION TO LIGHT TOUCH: ICD-10-CM

## 2025-08-19 DIAGNOSIS — Z74.09 IMPAIRED FUNCTIONAL MOBILITY, BALANCE, GAIT, AND ENDURANCE: Primary | ICD-10-CM

## 2025-08-19 DIAGNOSIS — M21.372 LEFT FOOT DROP: ICD-10-CM

## 2025-08-19 DIAGNOSIS — R53.1 LEFT-SIDED WEAKNESS: ICD-10-CM

## 2025-08-19 PROCEDURE — 97530 THERAPEUTIC ACTIVITIES: CPT | Mod: PO

## 2025-08-20 ENCOUNTER — CLINICAL SUPPORT (OUTPATIENT)
Dept: REHABILITATION | Facility: HOSPITAL | Age: 70
End: 2025-08-20
Payer: MEDICARE

## 2025-08-20 DIAGNOSIS — R53.1 LEFT-SIDED WEAKNESS: ICD-10-CM

## 2025-08-20 DIAGNOSIS — R53.1 LEFT-SIDED WEAKNESS: Primary | ICD-10-CM

## 2025-08-20 DIAGNOSIS — M21.372 LEFT FOOT DROP: ICD-10-CM

## 2025-08-20 DIAGNOSIS — Z74.09 IMPAIRED FUNCTIONAL MOBILITY, BALANCE, GAIT, AND ENDURANCE: Primary | ICD-10-CM

## 2025-08-20 DIAGNOSIS — R20.8 IMPAIRED SENSATION TO LIGHT TOUCH: ICD-10-CM

## 2025-08-20 DIAGNOSIS — Z78.9 IMPAIRED INSTRUMENTAL ACTIVITIES OF DAILY LIVING (IADL): ICD-10-CM

## 2025-08-20 PROCEDURE — 97112 NEUROMUSCULAR REEDUCATION: CPT | Mod: PO

## 2025-08-20 PROCEDURE — 97530 THERAPEUTIC ACTIVITIES: CPT | Mod: PO

## 2025-08-20 PROCEDURE — 97110 THERAPEUTIC EXERCISES: CPT | Mod: PO

## 2025-08-21 ENCOUNTER — OUTPATIENT CASE MANAGEMENT (OUTPATIENT)
Dept: ADMINISTRATIVE | Facility: OTHER | Age: 70
End: 2025-08-21
Payer: MEDICARE

## 2025-08-24 ENCOUNTER — PATIENT MESSAGE (OUTPATIENT)
Dept: REHABILITATION | Facility: HOSPITAL | Age: 70
End: 2025-08-24
Payer: MEDICARE

## 2025-08-26 ENCOUNTER — CLINICAL SUPPORT (OUTPATIENT)
Dept: REHABILITATION | Facility: HOSPITAL | Age: 70
End: 2025-08-26
Payer: MEDICARE

## 2025-08-26 DIAGNOSIS — M21.372 LEFT FOOT DROP: ICD-10-CM

## 2025-08-26 DIAGNOSIS — R53.1 LEFT-SIDED WEAKNESS: ICD-10-CM

## 2025-08-26 DIAGNOSIS — R20.8 IMPAIRED SENSATION TO LIGHT TOUCH: ICD-10-CM

## 2025-08-26 DIAGNOSIS — Z74.09 IMPAIRED FUNCTIONAL MOBILITY, BALANCE, GAIT, AND ENDURANCE: Primary | ICD-10-CM

## 2025-08-26 PROCEDURE — 97530 THERAPEUTIC ACTIVITIES: CPT | Mod: PO

## 2025-08-26 PROCEDURE — 97110 THERAPEUTIC EXERCISES: CPT | Mod: PO

## 2025-08-27 ENCOUNTER — CLINICAL SUPPORT (OUTPATIENT)
Dept: REHABILITATION | Facility: HOSPITAL | Age: 70
End: 2025-08-27
Payer: MEDICARE

## 2025-08-27 DIAGNOSIS — R20.8 IMPAIRED SENSATION TO LIGHT TOUCH: ICD-10-CM

## 2025-08-27 DIAGNOSIS — M21.372 LEFT FOOT DROP: ICD-10-CM

## 2025-08-27 DIAGNOSIS — Z78.9 IMPAIRED INSTRUMENTAL ACTIVITIES OF DAILY LIVING (IADL): ICD-10-CM

## 2025-08-27 DIAGNOSIS — Z74.09 IMPAIRED FUNCTIONAL MOBILITY, BALANCE, GAIT, AND ENDURANCE: Primary | ICD-10-CM

## 2025-08-27 DIAGNOSIS — R53.1 LEFT-SIDED WEAKNESS: ICD-10-CM

## 2025-08-27 DIAGNOSIS — R53.1 LEFT-SIDED WEAKNESS: Primary | ICD-10-CM

## 2025-08-27 PROCEDURE — 97112 NEUROMUSCULAR REEDUCATION: CPT | Mod: PO

## 2025-08-27 PROCEDURE — 97110 THERAPEUTIC EXERCISES: CPT | Mod: PO

## 2025-08-27 PROCEDURE — 97530 THERAPEUTIC ACTIVITIES: CPT | Mod: PO

## 2025-09-02 ENCOUNTER — OFFICE VISIT (OUTPATIENT)
Dept: NEUROLOGY | Facility: CLINIC | Age: 70
End: 2025-09-02
Payer: MEDICARE

## 2025-09-02 VITALS
SYSTOLIC BLOOD PRESSURE: 158 MMHG | BODY MASS INDEX: 38.08 KG/M2 | HEIGHT: 65 IN | DIASTOLIC BLOOD PRESSURE: 83 MMHG | HEART RATE: 82 BPM | OXYGEN SATURATION: 98 %

## 2025-09-02 DIAGNOSIS — I10 ESSENTIAL HYPERTENSION: ICD-10-CM

## 2025-09-02 DIAGNOSIS — R20.8 IMPAIRED SENSATION TO LIGHT TOUCH: ICD-10-CM

## 2025-09-02 DIAGNOSIS — Z72.0 TOBACCO USE: ICD-10-CM

## 2025-09-02 DIAGNOSIS — E78.5 HYPERLIPIDEMIA LDL GOAL <70: ICD-10-CM

## 2025-09-02 DIAGNOSIS — Z86.73 HISTORY OF ISCHEMIC MULTIFOCAL MULTIPLE VASCULAR TERRITORIES STROKE: Primary | ICD-10-CM

## 2025-09-02 DIAGNOSIS — E11.59 TYPE 2 DIABETES MELLITUS WITH OTHER CIRCULATORY COMPLICATION, WITHOUT LONG-TERM CURRENT USE OF INSULIN: ICD-10-CM

## 2025-09-02 PROCEDURE — 99999 PR PBB SHADOW E&M-EST. PATIENT-LVL V: CPT | Mod: PBBFAC,,, | Performed by: NURSE PRACTITIONER

## 2025-09-03 ENCOUNTER — CLINICAL SUPPORT (OUTPATIENT)
Dept: REHABILITATION | Facility: HOSPITAL | Age: 70
End: 2025-09-03
Payer: MEDICARE

## 2025-09-03 DIAGNOSIS — Z78.9 IMPAIRED INSTRUMENTAL ACTIVITIES OF DAILY LIVING (IADL): Primary | ICD-10-CM

## 2025-09-03 DIAGNOSIS — R20.8 IMPAIRED SENSATION TO LIGHT TOUCH: ICD-10-CM

## 2025-09-03 DIAGNOSIS — R53.1 LEFT-SIDED WEAKNESS: ICD-10-CM

## 2025-09-03 DIAGNOSIS — M21.372 LEFT FOOT DROP: ICD-10-CM

## 2025-09-03 DIAGNOSIS — Z74.09 IMPAIRED FUNCTIONAL MOBILITY, BALANCE, GAIT, AND ENDURANCE: Primary | ICD-10-CM

## 2025-09-03 PROBLEM — E78.5 HYPERLIPIDEMIA LDL GOAL <70: Status: ACTIVE | Noted: 2025-09-03

## 2025-09-03 PROBLEM — Z86.73 HISTORY OF ISCHEMIC MULTIFOCAL MULTIPLE VASCULAR TERRITORIES STROKE: Status: ACTIVE | Noted: 2025-07-19

## 2025-09-03 PROCEDURE — 97530 THERAPEUTIC ACTIVITIES: CPT | Mod: PO

## 2025-09-03 PROCEDURE — 97110 THERAPEUTIC EXERCISES: CPT | Mod: PO

## 2025-09-03 RX ORDER — ROSUVASTATIN CALCIUM 20 MG/1
20 TABLET, COATED ORAL DAILY
Qty: 90 TABLET | Refills: 3 | Status: SHIPPED | OUTPATIENT
Start: 2025-09-03 | End: 2026-09-03

## 2025-09-04 ENCOUNTER — CLINICAL SUPPORT (OUTPATIENT)
Dept: REHABILITATION | Facility: HOSPITAL | Age: 70
End: 2025-09-04
Payer: MEDICARE

## 2025-09-04 DIAGNOSIS — M21.372 LEFT FOOT DROP: ICD-10-CM

## 2025-09-04 DIAGNOSIS — R20.8 IMPAIRED SENSATION TO LIGHT TOUCH: ICD-10-CM

## 2025-09-04 DIAGNOSIS — R53.1 LEFT-SIDED WEAKNESS: ICD-10-CM

## 2025-09-04 DIAGNOSIS — Z74.09 IMPAIRED FUNCTIONAL MOBILITY, BALANCE, GAIT, AND ENDURANCE: Primary | ICD-10-CM

## 2025-09-04 PROCEDURE — 97530 THERAPEUTIC ACTIVITIES: CPT | Mod: PO
